# Patient Record
Sex: MALE | Race: WHITE
[De-identification: names, ages, dates, MRNs, and addresses within clinical notes are randomized per-mention and may not be internally consistent; named-entity substitution may affect disease eponyms.]

---

## 2018-10-03 ENCOUNTER — HOSPITAL ENCOUNTER (OUTPATIENT)
Dept: HOSPITAL 47 - RADNMMAIN | Age: 78
Discharge: HOME | End: 2018-10-03
Attending: INTERNAL MEDICINE
Payer: MEDICARE

## 2018-10-03 ENCOUNTER — HOSPITAL ENCOUNTER (OUTPATIENT)
Dept: HOSPITAL 47 - RADECHMAIN | Age: 78
Discharge: HOME | End: 2018-10-03
Payer: MEDICARE

## 2018-10-03 ENCOUNTER — HOSPITAL ENCOUNTER (OUTPATIENT)
Dept: HOSPITAL 47 - LABWHC1 | Age: 78
Discharge: HOME | End: 2018-10-03
Payer: MEDICARE

## 2018-10-03 DIAGNOSIS — I07.1: Primary | ICD-10-CM

## 2018-10-03 DIAGNOSIS — Z01.812: Primary | ICD-10-CM

## 2018-10-03 DIAGNOSIS — R06.02: ICD-10-CM

## 2018-10-03 DIAGNOSIS — I20.8: Primary | ICD-10-CM

## 2018-10-03 LAB
ALBUMIN SERPL-MCNC: 4.3 G/DL (ref 3.5–5)
ALP SERPL-CCNC: 84 U/L (ref 38–126)
ALT SERPL-CCNC: 38 U/L (ref 21–72)
ANION GAP SERPL CALC-SCNC: 7 MMOL/L
AST SERPL-CCNC: 48 U/L (ref 17–59)
BASOPHILS # BLD AUTO: 0 K/UL (ref 0–0.2)
BASOPHILS NFR BLD AUTO: 1 %
BUN SERPL-SCNC: 16 MG/DL (ref 9–20)
CALCIUM SPEC-MCNC: 9.8 MG/DL (ref 8.4–10.2)
CHLORIDE SERPL-SCNC: 110 MMOL/L (ref 98–107)
CO2 SERPL-SCNC: 26 MMOL/L (ref 22–30)
EOSINOPHIL # BLD AUTO: 0.2 K/UL (ref 0–0.7)
EOSINOPHIL NFR BLD AUTO: 3 %
ERYTHROCYTE [DISTWIDTH] IN BLOOD BY AUTOMATED COUNT: 4.82 M/UL (ref 4.3–5.9)
ERYTHROCYTE [DISTWIDTH] IN BLOOD: 14.7 % (ref 11.5–15.5)
GLUCOSE SERPL-MCNC: 141 MG/DL (ref 74–99)
HCT VFR BLD AUTO: 43.2 % (ref 39–53)
HGB BLD-MCNC: 14 GM/DL (ref 13–17.5)
LYMPHOCYTES # SPEC AUTO: 1.8 K/UL (ref 1–4.8)
LYMPHOCYTES NFR SPEC AUTO: 29 %
MCH RBC QN AUTO: 29.1 PG (ref 25–35)
MCHC RBC AUTO-ENTMCNC: 32.4 G/DL (ref 31–37)
MCV RBC AUTO: 89.6 FL (ref 80–100)
MONOCYTES # BLD AUTO: 0.3 K/UL (ref 0–1)
MONOCYTES NFR BLD AUTO: 5 %
NEUTROPHILS # BLD AUTO: 3.7 K/UL (ref 1.3–7.7)
NEUTROPHILS NFR BLD AUTO: 60 %
PLATELET # BLD AUTO: 103 K/UL (ref 150–450)
POTASSIUM SERPL-SCNC: 5 MMOL/L (ref 3.5–5.1)
PROT SERPL-MCNC: 7.2 G/DL (ref 6.3–8.2)
SODIUM SERPL-SCNC: 143 MMOL/L (ref 137–145)
WBC # BLD AUTO: 6.1 K/UL (ref 3.8–10.6)

## 2018-10-03 PROCEDURE — 93351 STRESS TTE COMPLETE: CPT

## 2018-10-03 PROCEDURE — 93306 TTE W/DOPPLER COMPLETE: CPT

## 2018-10-03 PROCEDURE — 36415 COLL VENOUS BLD VENIPUNCTURE: CPT

## 2018-10-03 PROCEDURE — 85025 COMPLETE CBC W/AUTO DIFF WBC: CPT

## 2018-10-03 PROCEDURE — 80053 COMPREHEN METABOLIC PANEL: CPT

## 2018-10-03 NOTE — ECHOF
Referral Reason:Shortness of Breath, EKG Changes



MEASUREMENTS

--------

HEIGHT: 177.8 cm

WEIGHT: 104.3 kg

BP: 110/49

RVIDd:   3.3 cm     (< 3.3)

IVSd:   1.1 cm     (0.6 - 1.1)

LVIDd:   5.4 cm     (3.9 - 5.3)

LVPWd:   1.0 cm     (0.6 - 1.1)

IVSs:   1.6 cm

LVIDs:   3.6 cm

LVPWs:   1.4 cm

LA Diam:   2.7 cm     (2.7 - 3.8)

LAESV Index (A-L):   19.53 ml/m

Ao Diam:   3.5 cm     (2.0 - 3.7)

AV Cusp:   2.2 cm     (1.5 - 2.6)

MV EXCURSION:   18.395 mm     (> 18.000)

MV EF SLOPE:   43 mm/s     (70 - 150)

EPSS:   1.3 cm

MV E Amadeo:   0.64 m/s

MV DecT:   222 ms

MV A Amadeo:   0.73 m/s

MV E/A Ratio:   0.87 

RAP:   5.00 mmHg

RVSP:   24.71 mmHg







FINDINGS

--------

Sinus rhythm.

This was a technically good study.

The left ventricular size is normal.   There is borderline concentric left ventricular hypertrophy.  
 Overall left ventricular systolic function is normal with, an EF between 55 - 60 %.

The right ventricle is mildly enlarged.

Normal LA  size by volume 22+/-6 ml/m2.

The right atrium is normal in size.

Lipomatous Hypertrophy of the atrial septum is present

The aortic valve is trileaflet and appears structurally normal.

The mitral valve is normal.

Mild tricuspid regurgitation present.   Right ventricular systolic pressure is normal at < 35 mmHg.

There is no pulmonic regurgitation present.

The aortic root size is normal.

IVC Not well visulized.

There is no pericardial effusion.



CONCLUSIONS

--------

1. Sinus rhythm.

2. This was a technically good study.

3. The left ventricular size is normal.

4. There is borderline concentric left ventricular hypertrophy.

5. Overall left ventricular systolic function is normal with, an EF between 55 - 60 %.

6. The right ventricle is mildly enlarged.

7. Normal LA size by volume 22+/-6 ml/m2.

8. Lipomatous Hypertrophy of the atrial septum is present

9. The aortic valve is trileaflet and appears structurally normal.

10. The mitral valve is normal.

11. Mild tricuspid regurgitation present.

12. Right ventricular systolic pressure is normal at < 35 mmHg.

13. There is no pulmonic regurgitation present.

14. The aortic root size is normal.

15. IVC Not well visulized.

16. There is no pericardial effusion.





SONOGRAPHER: Orni Ge RDCS

## 2018-10-03 NOTE — ECHOS
STRESS ECHOCARDIOGRAM



INDICATIONS:

Chest pain.



BASELINE HEART RATE:

77



BASELINE BLOOD PRESSURE:

110/39



MAXIMUM HEART RATE:

131



MAXIMUM BLOOD PRESSURE:

160/42



85% MPHR:

119



100% MPHR:

140



METS:

7.0



MAXIMUM STAGE REACHED:

2



TOTAL EXERCISE TIME:

5:00



CLINICAL INFORMATION:

Baseline rhythm is sinus mechanism, normal axis and intervals, normal echocardiogram,

rate off 77, baseline blood pressure 110/39 mmHg. Patient exercised on Corbin protocol

for 5 minute reaching peak rate 131 beats per minute which is equal to 93% maximum

predicted heart rate.  Peak blood pressure 160/42 mmHg.  Test was terminated due to

fatigue and dyspnea.  There was no chest pain.



Electrocardiograph monitoring revealed 1 mm ST-segment depression inferolateral leads

that resolved slowly in recovery.



FINDINGS:

Baseline echocardiogram revealed normal function at peak exercise, there was mild

hypokinesis in the inferolateral leads that improved in recovery.



CONCLUSION:

1. Good exercise tolerance with positive electrocardiograph stress testing.

2. Probably abnormal stress echocardiogram because of the quality of the images

    raising the possibility of inferolateral wall ischemia.





MMODL / IJN: [id number] / Job#: [id number]

## 2018-10-03 NOTE — HP
HISTORY AND PHYSICAL



Patient will be admitted to undergo cardiac catheterization on October 5, 2018. Mr. Randle is an 78-year-old male with known history of hypertension, hyperlipidemia,

diabetes mellitus, who has been complaining of progressive dyspnea on exertion without

associated chest discomfort.  He has no history of peripheral edema.  No PND.  No

orthopnea.  No dizziness.  No palpitation or syncope.  He has underwent a stress

echocardiogram where he walked for 5 minutes, had a 1 mm ST-segment depression with

questionable inferolateral wall hypokinesis at peak exercise.  Patient has no prior

stress test.  He has questionable history of stroke in the past.  His coronary risk

factors are remarkable for diabetes, hypertension, hyperlipidemia.  He stopped smoking

over 30 years ago.



MEDICATIONS INCLUDE::

1. Aspirin.

2. Allopurinol 100 mg daily.

3. Janumet  twice a day.

4. Crestor 10 mg daily.

5. Lisinopril 2.5 mg daily.

6. Vitamin C.

7. Insulin.



REVIEW OF SYSTEMS:

RESPIRATORY SYSTEM:  There is no history of documented asthma, emphysema or bronchitis.

GI SYSTEM:  No recent GI bleeding.  No peptic ulcer disease.

 SYSTEM:  No dysuria or hematuria.

NERVOUS SYSTEM:  No stroke or seizure.



PHYSICAL EXAMINATION:

An 78-year-old male, alert, oriented, in no apparent distress.  Appears younger than

stated age.  Blood pressure 130/70 with a heart rate in the 60s.

HEAD:  Normocephalic.

EYES: Sclerae nonicteric.

NECK:  Good upstroke, no bruit, no shunts.

CHEST:  Clear to auscultation.

HEART:  Regular rate and rhythm, S1, S2.  No S3.  No rub or gallop.

ABDOMEN:  Soft, nontender, positive bowel sounds, no organomegaly.

EXTREMITIES:  No edema, intact pulses.



IMPRESSION:

1. Symptoms of progressive dyspnea on exertion with abnormal stress echocardiogram

    consistent with stress-induced ischemia.

2. Hypertension.

3. Hyperlipidemia.

4. Diabetes mellitus.



RECOMMENDATION:

I recommend proceeding with coronary angiography to assess his status and guide his

treatment.  The rationale behind the procedures, risks and complication were discussed

with the patient who is in full understanding and agreement.  An echocardiogram with

Doppler will be obtained and depending on the results of testing, further

recommendation will be made.





MMODL / IJN: [id number] / Job#: [id number]

## 2018-10-05 ENCOUNTER — HOSPITAL ENCOUNTER (OUTPATIENT)
Dept: HOSPITAL 47 - CATHCVL | Age: 78
LOS: 2 days | Discharge: HOME | End: 2018-10-07
Payer: MEDICARE

## 2018-10-05 VITALS — BODY MASS INDEX: 31.5 KG/M2

## 2018-10-05 DIAGNOSIS — I25.84: ICD-10-CM

## 2018-10-05 DIAGNOSIS — E11.9: ICD-10-CM

## 2018-10-05 DIAGNOSIS — N40.0: ICD-10-CM

## 2018-10-05 DIAGNOSIS — Z79.899: ICD-10-CM

## 2018-10-05 DIAGNOSIS — I77.1: ICD-10-CM

## 2018-10-05 DIAGNOSIS — Z87.891: ICD-10-CM

## 2018-10-05 DIAGNOSIS — M10.9: ICD-10-CM

## 2018-10-05 DIAGNOSIS — I25.10: Primary | ICD-10-CM

## 2018-10-05 DIAGNOSIS — E78.5: ICD-10-CM

## 2018-10-05 DIAGNOSIS — Z82.49: ICD-10-CM

## 2018-10-05 DIAGNOSIS — M19.90: ICD-10-CM

## 2018-10-05 DIAGNOSIS — I07.1: ICD-10-CM

## 2018-10-05 DIAGNOSIS — I10: ICD-10-CM

## 2018-10-05 DIAGNOSIS — Z79.4: ICD-10-CM

## 2018-10-05 DIAGNOSIS — Z79.82: ICD-10-CM

## 2018-10-05 DIAGNOSIS — I65.23: ICD-10-CM

## 2018-10-05 LAB
GLUCOSE BLD-MCNC: 111 MG/DL (ref 75–99)
GLUCOSE BLD-MCNC: 156 MG/DL (ref 75–99)
GLUCOSE BLD-MCNC: 220 MG/DL (ref 75–99)
PH UR: 6.5 [PH] (ref 5–8)
SP GR UR: 1.02 (ref 1–1.03)
UROBILINOGEN UR QL STRIP: <2 MG/DL (ref ?–2)

## 2018-10-05 PROCEDURE — 85610 PROTHROMBIN TIME: CPT

## 2018-10-05 PROCEDURE — 93880 EXTRACRANIAL BILAT STUDY: CPT

## 2018-10-05 PROCEDURE — 93922 UPR/L XTREMITY ART 2 LEVELS: CPT

## 2018-10-05 PROCEDURE — 93970 EXTREMITY STUDY: CPT

## 2018-10-05 PROCEDURE — 86901 BLOOD TYPING SEROLOGIC RH(D): CPT

## 2018-10-05 PROCEDURE — 84443 ASSAY THYROID STIM HORMONE: CPT

## 2018-10-05 PROCEDURE — 71046 X-RAY EXAM CHEST 2 VIEWS: CPT

## 2018-10-05 PROCEDURE — 80053 COMPREHEN METABOLIC PANEL: CPT

## 2018-10-05 PROCEDURE — 87070 CULTURE OTHR SPECIMN AEROBIC: CPT

## 2018-10-05 PROCEDURE — 80074 ACUTE HEPATITIS PANEL: CPT

## 2018-10-05 PROCEDURE — 80061 LIPID PANEL: CPT

## 2018-10-05 PROCEDURE — 86920 COMPATIBILITY TEST SPIN: CPT

## 2018-10-05 PROCEDURE — 71250 CT THORAX DX C-: CPT

## 2018-10-05 PROCEDURE — 94150 VITAL CAPACITY TEST: CPT

## 2018-10-05 PROCEDURE — 85730 THROMBOPLASTIN TIME PARTIAL: CPT

## 2018-10-05 PROCEDURE — 85025 COMPLETE CBC W/AUTO DIFF WBC: CPT

## 2018-10-05 PROCEDURE — 81003 URINALYSIS AUTO W/O SCOPE: CPT

## 2018-10-05 PROCEDURE — 83036 HEMOGLOBIN GLYCOSYLATED A1C: CPT

## 2018-10-05 PROCEDURE — 87086 URINE CULTURE/COLONY COUNT: CPT

## 2018-10-05 PROCEDURE — 86850 RBC ANTIBODY SCREEN: CPT

## 2018-10-05 PROCEDURE — 92924 PRQ TRLUML C ATHRC 1 ART&/BR: CPT

## 2018-10-05 PROCEDURE — 83735 ASSAY OF MAGNESIUM: CPT

## 2018-10-05 PROCEDURE — 86900 BLOOD TYPING SEROLOGIC ABO: CPT

## 2018-10-05 PROCEDURE — 93454 CORONARY ARTERY ANGIO S&I: CPT

## 2018-10-05 RX ADMIN — INSULIN ASPART SCH: 100 INJECTION, SOLUTION INTRAVENOUS; SUBCUTANEOUS at 12:31

## 2018-10-05 RX ADMIN — INSULIN ASPART SCH UNIT: 100 INJECTION, SOLUTION INTRAVENOUS; SUBCUTANEOUS at 21:32

## 2018-10-05 RX ADMIN — INSULIN ASPART SCH: 100 INJECTION, SOLUTION INTRAVENOUS; SUBCUTANEOUS at 19:00

## 2018-10-05 RX ADMIN — INSULIN ASPART SCH UNIT: 100 INJECTION, SOLUTION INTRAVENOUS; SUBCUTANEOUS at 19:04

## 2018-10-05 RX ADMIN — INSULIN ASPART SCH UNIT: 100 INJECTION, SOLUTION INTRAVENOUS; SUBCUTANEOUS at 13:11

## 2018-10-05 RX ADMIN — INSULIN DETEMIR SCH UNIT: 100 INJECTION, SOLUTION SUBCUTANEOUS at 21:32

## 2018-10-05 RX ADMIN — MUPIROCIN SCH APPLIC: 20 OINTMENT TOPICAL at 21:33

## 2018-10-05 NOTE — P.CONS
History of Present Illness





- Reason for Consult


Consult date: 10/05/18


Medical management





- History of Present Illness





This is an 78-year-old male patient of Dr. Christiansen.  He has underlying history of 

diabetes mellitus type 2, hypertension, BPH, hyperlipidemia, remote history of 

tobacco use.  Patient states that he went to see Dr. Christiansen for his physical and 

he complained that when he was pulling weeds he developed shortness of breath 

and he had to sit down and rest.  He underwent a stress test which was positive 

and was then scheduled for heart catheterization with Dr. Maier which found a 

99% stenosis in the proximal left circumflex, 30% stenosis of right coronaries 

artery and diffuse intimal disease.  PTCA and arthrectomy was attempted but 

unsuccessful of the circumflex lesion.  Cardiothoracic surgery was then 

consulted for 1 vessel CABG.





Review of Systems


All systems: negative


Constitutional: Reports fatigue, Denies chills, Denies fever


Eyes: denies blurred vision, denies pain


Ears, nose, mouth and throat: Denies headache, Denies sore throat


Cardiovascular: Reports decreased exercise tolerance, Reports dyspnea on 

exertion, Denies chest pain, Denies lightheadedness, Denies shortness of breath

, Denies syncope


Respiratory: Denies cough, Denies cough with sputum, Denies dyspnea, Denies 

excessive sputum, Denies hemoptysis, Denies home oxygen, Denies wheezing


Gastrointestinal: Denies abdominal pain, Denies diarrhea, Denies nausea, Denies 

vomiting


Genitourinary: Denies dysuria


Musculoskeletal: Denies myalgias


Integumentary: Denies pruritus, Denies rash


Neurological: Denies numbness, Denies weakness


Psychiatric: Denies anxiety, Denies depression


Endocrine: Denies fatigue, Denies weight change





Past Medical History


Past Medical History: Coronary Artery Disease (CAD), Diabetes Mellitus, 

Hyperlipidemia, Hypertension


Additional Past Medical History / Comment(s): BPH


History of Any Multi-Drug Resistant Organisms: None Reported


Past Surgical History: Appendectomy, Joint Replacement, Tonsillectomy


Additional Past Surgical History / Comment(s): left knee replaced, right 

rotator cuff SX, COLONOSCOPY,


Past Anesthesia/Blood Transfusion Reactions: No Reported Reaction


Past Psychological History: No Psychological Hx Reported


Smoking Status: Former smoker


Past Alcohol Use History: Occasional


Additional Past Alcohol Use History / Comment(s): Patient was a smoker of one 

pack per day and QUIT SMOKING 35 YRS AGO.  He drinks approximately 1 beer/

3months.


Past Drug Use History: None Reported





- Past Family History


  ** Mother


Family Medical History: No Reported History


Additional Family Medical History / Comment(s): Mother had history of 

congenital heart disease, CHF.





  ** Father


Family Medical History: No Reported History, CVA/TIA


Additional Family Medical History / Comment(s): Father contracted malaria.  

History of CVA.





  ** Brother(s)


Family Medical History: No Reported History


Additional Family Medical History / Comment(s): Brother has history of 

headaches.





  ** Sister(s)


Family Medical History: No Reported History





  ** Daughter(s)


Family Medical History: No Reported History


Additional Family Medical History / Comment(s): Patient has one daughter with 

no major medical problems.





  ** Son(s)


Family Medical History: No Reported History


Additional Family Medical History / Comment(s): Patient has one son with no 

major medical problems.





Medications and Allergies


 Home Medications











 Medication  Instructions  Recorded  Confirmed  Type


 


Allopurinol [Zyloprim] 100 mg PO DAILY 12/22/14 10/05/18 History


 


Insulin Glargine,Hum.rec.anlog 80 unit SQ HS 12/22/14 10/05/18 History





[Lantus Solostar]    


 


Rosuvastatin Calcium [Crestor] 10 mg PO DAILY 12/22/14 10/05/18 History


 


sitaGLIPtin PHOS/metFORMIN HCL 1 tab PO BID 12/22/14 10/04/18 History





[Janumet 50-1,000 mg Tablet]    


 


Aspirin EC [Ecotrin Low Dose] 325 mg PO BID 06/01/16 10/05/18 History


 


Canagliflozin [Invokana] 100 mg PO DAILY 06/01/16 10/05/18 History


 


INSULIN LISPRO (HumaLOG) [humaLOG] 10 units SQ AC-TID 06/01/16 10/04/18 History


 


Lisinopril [Zestril] 2.5 mg PO DAILY 06/01/16 10/05/18 History


 


Ascorbic Acid [Vitamin C] 250 mg PO DAILY 10/04/18 10/05/18 History











 Allergies











Allergy/AdvReac Type Severity Reaction Status Date / Time


 


No Known Allergies Allergy   Verified 10/04/18 12:22














Physical Exam


Vitals: 


 Vital Signs











  Temp Pulse Pulse Resp BP BP Pulse Ox


 


 10/05/18 10:43    75   138/66  


 


 10/05/18 10:28    79    124/82 


 


 10/05/18 10:13   58 L    137/91   97


 


 10/05/18 06:50  98.7 F  58 L   18  119/59  134/71  94 L








 Intake and Output











 10/04/18 10/05/18 10/05/18





 22:59 06:59 14:59


 


Intake Total   452


 


Output Total   600


 


Balance   -148


 


Intake:   


 


  IV   352


 


  Intake, IV Titration   100





  Amount   


 


    Sodium Chloride 0.9% 1,   100





    000 ml @ 100 mls/hr IV .   





    Q10H [facility] Rx#:[id number]   


 


Output:   


 


  Urine   600


 


Other:   


 


  Weight  99.7 kg 99.7 kg

















Gen: This is an 78-year-old  male patient.  He is sitting up in bed 

appears to be comfortable and in no acute distress. 


HEENT: Head is atraumatic, normocephalic. Pupils equal, round. Sclerae is 

anicteric. 


NECK: Supple. No JVD. No lymphadenopathy. No thyromegaly. 


LUNGS: Clear to auscultation. No wheezes or rhonchi.  No intercostal 

retractions.


HEART: Regular rate and rhythm. No murmur. 


ABDOMEN: Soft. Bowel sounds are present. No masses.  No tenderness.


EXTREMITIES: No pedal edema.  No calf tenderness.


NEUROLOGICAL: Patient is awake, alert and oriented x3. Cranial nerves 2 through 

12 are grossly intact. 








Results


Labs: 


 Abnormal Lab Results - Last 24 Hours (Table)











  10/05/18 Range/Units





  06:58 


 


POC Glucose (mg/dL)  156 H  (75-99)  mg/dL














Assessment and Plan


Plan: 





1.  Coronary artery disease with plan for 1 vessel CABG.  Cardiothoracic 

surgery team on consult.  Continue aspirin daily, Lipitor 20 mg daily





2.  Diabetes mellitus type 2.  Patient is normally on Lantus 80 units which 

will be decreased to 45 units at bedtime, NovoLog normally 10 units will be 

decreased to 5 units 3 times daily with meals and NovoLog scale.  Invokana, 

Janumet will be discontinued.  Hemoglobin A1c ordered.





3.  Hypertension.  Continue lisinopril 2.5 mg daily





4.  Hyperlipidemia.  Continue Lipitor.





5.  Benign prostatic hypertrophy.





6.  Osteoarthritis, generalized, stable.





7.  Remote history of tobacco use.





8.  Gout, stable.  Continue allopurinol 100 mg daily.





9.  GI prophylaxis.  Pepcid.





Patient will be admitted to the hospital for a minimum of 5 night stay.


Discharge plan: To be determined is likely return home.


Impression and plan of care have been directed as dictated by the signing 

physician.  Millie Crump nurse practitioner acting as scribe for signing 

physician.

## 2018-10-05 NOTE — PTCA
PERCUTANEOUSTRANS CORORONARY ANGIOGRAPHY



Mr. Magaña is an 78-year-old male with known history of hypertension, hyperlipidemia,

and diabetes mellitus, who has had an abnormal stress echocardiogram and was

symptomatic, underwent cardiac catheterization, was found to have heavily calcified

coronary arteries with critical stenosis involving the proximal left circumflex. In

view of that, recommendation was made regarding angioplasty and stenting.  The

procedure as well as the risks and complication were discussed with the patient who is

in full understanding and agreement.



PROCEDURE:

A 6-Arabic FL 3.5 guiding catheter introduced in system. After cannulating the left

main a 0.014 balanced medium weight J-wire was advanced across the lesion and

positioned in the distal obtuse marginal branch.  Subsequently, a 3.0 x 12 mm Trek

balloon was advanced that was unable to cross the lesion that balloon was removed and

attempt to advance a 2.0 x 8 mm Trek balloon were unsuccessful as well.  The balloon

was removed and a whisper 0.014 J-wire was advanced next to the first one and

positioned distally.  Subsequently attempt to advance the 2.0 x 8 balloon were

unsuccessful.  That was removed, then a 1.5 x 6 mm Trek balloon was advanced and the

balloon will not advance. That balloon was removed and a GuideLiner was advanced after

removing the BMW J. Subsequently the 1.5 x 6 mm Trek balloon was advanced and

inflations were done.  After removing that balloon, a 2.0 x 6 mm Trek balloon was

advanced and inflations were done.  After that, the balloon was removed and a 3.0 x 12

mm Trek balloon was advanced and inflation up to 20 atmospheres were done

_____inability to open up the lesion completely.  After removing that balloon, attempt

to advance a 3.0 x 10 mm AngioSculpt balloon were unsuccessful.  That was removed and

attempt to advance a 3.25 x 12 mm NC Trek was unsuccessful as well as a 2.0 x 8 NC Trek

was unsuccessful.  At that point, another 0.014 whisper J-wire was advanced distally

and exchanged over a FineCross to the Viper stent.  After removing the the FineCross,

360 rotational arthrectomy was performed 3 runs. After removing that and exchanging the

wire back to whisper J wire and advancing the GuideLiner, attempt to advance the Trek

3.0 x 12 mm was unsuccessful. Subsequently a 2.5 x 8 mm NC Trek balloon was advanced

and inflation up to 10 atmospheres were done.  The balloon was removed and the

GuideLiner was removed.  Another whisper J-wire was advanced with the help of the

[place]. The wire was exchanged to a Mailman.  Attempt to advance the 3.0 x 12 mm

balloon over the Mailman and over the whisper were unsuccessful.  At that point, the

guiding catheter, the balloon and the guidewire were removed the sheath was removed.

Hemostasis was obtained with deployment of a TR band.  There was no immediate

complication. Patient was returned to his room in stable condition.  Of note, the

patient received Angiomax per protocol as well as oral loading dose of Brilinta.  He

had no chest discomfort or EKG changes with the inflation.



RESULTS:

Unsuccessful angioplasty of the proximal left circumflex with reduction of stenosis

from 95% to 80% in a heavily calcified angulated segment.



RECOMMENDATION:

Patient will be evaluated for possible single bypass surgery.  Those findings and

recommendation were discussed with the patient and his family and are in full

understanding and agreement.



Duration of procedure are 1 hour 57 minutes.





MMODL / IJN: [id number] / Job#: [id number]

## 2018-10-05 NOTE — CC
CARDIAC CATHETERIZATION REPORT



Mr. Magaña is an 78-year-old male with a known history of hypertension,

hyperlipidemia, diabetes mellitus who has been complaining of dyspnea on exertion.

Underwent a stress test where he had ST-segment depression as well as evidence of

stress-induced ischemia involving the lateral wall.  In view of that, recommendation

made regarding cardiac catheterization.  The procedures, risks and complications were

discussed with the patient who is in full understanding and agreement.



PROCEDURE:

Patient was brought to the cath lab in a fasting semi-sedated state after receiving

fentanyl and Benadryl and achieving moderate conscious sedated state.  Using Xylocaine

anesthesia and Seldinger technique, a 6-Setswana sheath was introduced in the right

radial artery.  Selective right and left angiography performed using 5-Setswana 3.5 bend

right and left Tess catheter, multiple views of the coronary artery including

hemiaxial views were obtained. Following that, the catheter were removed, images were

reviewed.



FLUOROSCOPY:

There was severe calcification involving all of the coronary arteries.



LEFT MAIN: This is a large-sized vessel bifurcating into left circumflex, left anterior

descending artery, left main coronary artery has no evidence of high-grade stenosis.



LEFT ANTERIOR DESCENDING ARTERY:  This is a large-sized vessel reaching toward the apex

with a wraparound apex segment, giving rise to two diagonal branch, the left anterior

descending artery and the proximal mid segment has intimal disease with area of

stenosis of 30% to 40% without any evidence of high-grade stenosis.



LEFT CIRCUMFLEX:  This is a large nondominant vessel giving rise to 2 obtuse marginal

branch in the proximal left circumflex and a calcified tortuous segment that has a 99%

stenosis.  The rest of the vessel has no high-grade stenosis.



RIGHT CORONARY ARTERY:  This is a large dominant vessel, bifurcating into PDA and

posterolateral segment branches.  The right coronary artery in mid segment has diffuse

intimal disease of 30%.  The rest of the vessel has no high-grade stenosis.



LEFT VENTRICULOGRAM:  Left ventriculogram was not performed.



CONCLUSION:

1. Heavily calcified coronary arteries.

2. Severe critical stenosis involving the proximal left circumflex.

3. Mild to moderate disease in the left anterior descending artery and right coronary

    artery.



RECOMMENDATION:

In view of finding anatomy, I recommend proceeding with angioplasty and stenting.  The

procedures, risks and complications were discussed with the patient who is in full

understanding and agreement.





MMODL / IJN: [id number] / Job#: [id number]

## 2018-10-05 NOTE — P.GSCN
<Ray Kruger - Last Filed: 10/05/18 12:05>





History of Present Illness


Consult date: 10/05/18


Reason for Consult: 





Coronary artery disease, evaluation for myocardial revascularization.


Requesting physician: Brandon Maier


History of present illness: 





This is an 78-year-old gentleman who is followed by Dr. Christiansen on an outpatient 

basis.  He has a past medical history significant for hyperlipidemia, 

hypertension,  diabetes mellitus type 2 insulin-dependent and a remote history 

of tobacco dependence which he quit over 35 years ago.  Recently, the patient 

has been complaining of progressive shortness of breath with exertion.  He 

denies any complaints of chest pain, peripheral edema, syncope or palpitations.

  Subsequently, he was seen by Dr. Maier from cardiology who performed a 

stress test on the patient on 10/03/2018.  The stress test results showed good 

exercise tolerance although revealed a 1 mm ST segment depression with 

questionable inferolateral wall hypokinesis at peak exercise.  A 2-D 

echocardiogram was also completed which showed a overall left ventricular 

systolic function to be normal with an ejection fraction between 55 and 60%, 

and mild tricuspid valve regurgitation.  Due to the patient's complaints of 

progressive shortness of breath and stress test results he was recommended to 

undergo a cardiac catheterization.  After consent was obtained Dr. Maier 

performed a cardiac catheterization today which demonstrated heavily calcified 

coronary arteries, with a 30-40% stenosis to his proximal and mid segment of 

his left anterior descending coronary artery, a 99% stenosis to his proximal 

left circumflex coronary artery, and a 30% stenosis to his right coronary 

artery with diffuse intimal disease.  Subsequently due to his cardiac 

catheterization findings of a 99% proximal stenosis to his circumflex coronary 

artery Dr. Maier attempted an unsuccessful PTCA/arthrectomy to the circumflex 

coronary artery due to heavy calcification.  The cardiac catheterization 

results were reviewed with the patient by Dr. Maier and a consult was placed 

to Dr. Amaro from cardiothoracic surgery for evaluation for possible myocardial 

revascularization surgery.





Review of Systems





A 14 point review of systems was completed except as mentioned in HPI.





Past Medical History


Past Medical History: Diabetes Mellitus, Hyperlipidemia, Hypertension


History of Any Multi-Drug Resistant Organisms: None Reported


Past Surgical History: Appendectomy, Joint Replacement, Tonsillectomy


Additional Past Surgical History / Comment(s): left knee replaced, right 

rotator cuff SX, COLONOSCOPY,


Past Anesthesia/Blood Transfusion Reactions: No Reported Reaction


Past Psychological History: No Psychological Hx Reported


Smoking Status: Former smoker (Quit over 35 years ago.)


Past Alcohol Use History: Rare


Additional Past Alcohol Use History / Comment(s): QUIT SMOKING 35 YRS AGO


Past Drug Use History: None Reported





- Past Family History


  ** Mother


Family Medical History: Congestive Heart Failure (CHF)


Additional Family Medical History / Comment(s): Past away at age 85.





  ** Father


Additional Family Medical History / Comment(s):  from complication from 

malaria at age 27





  ** Brother(s)


Family Medical History: No Reported History





  ** Sister(s)


Family Medical History: No Reported History





  ** Daughter(s)


Family Medical History: No Reported History





  ** Son(s)


Family Medical History: No Reported History





Medications and Allergies


 Home Medications











 Medication  Instructions  Recorded  Confirmed  Type


 


Allopurinol [Zyloprim] 100 mg PO DAILY 12/22/14 10/05/18 History


 


Insulin Glargine,Hum.rec.anlog 80 unit SQ HS 12/22/14 10/05/18 History





[Lantus Solostar]    


 


Rosuvastatin Calcium [Crestor] 10 mg PO DAILY 12/22/14 10/05/18 History


 


sitaGLIPtin PHOS/metFORMIN HCL 1 tab PO BID 12/22/14 10/04/18 History





[Janumet 50-1,000 mg Tablet]    


 


Aspirin EC [Ecotrin Low Dose] 325 mg PO BID 06/01/16 10/05/18 History


 


Canagliflozin [Invokana] 100 mg PO DAILY 06/01/16 10/05/18 History


 


INSULIN LISPRO (HumaLOG) [humaLOG] 10 units SQ AC-TID 06/01/16 10/04/18 History


 


Ascorbic Acid [Vitamin C] 250 mg PO DAILY 10/04/18 10/05/18 History


 


Isosorbide Mononitrate ER [Imdur] 30 mg PO DAILY #30 tab 10/07/18  Rx


 


Lisinopril [Zestril] 2.5 mg PO DAILY  tab 10/07/18  Rx


 


Metoprolol Tartrate [Lopressor] 25 mg PO BID #60 tab 10/07/18  Rx


 


Nitroglycerin Sl Tabs [Nitrostat] 0.4 mg SUBLINGUAL Q5M PRN #30 tab 10/07/18  Rx











 Allergies











Allergy/AdvReac Type Severity Reaction Status Date / Time


 


No Known Allergies Allergy   Verified 10/04/18 12:22














Surgical - Exam


 Vital Signs











Temp Pulse Resp BP Pulse Ox


 


 98.7 F   58 L  18   119/59   94 L


 


 10/05/18 06:50  10/05/18 06:50  10/05/18 06:50  10/05/18 06:50  10/05/18 06:50














- General


well developed, well nourished, no distress, no pain, obese





- Eyes


PERRL, normal ocular movement





- ENT


normal pinna, normal nares, normal mucosa, decreased hearing (Hearing aids to 

both ears.), poor alf (Full dentures.)





- Neck





Neck is supple, no lymphadenopathy.


no masses, no bruits, trachea midline, no venous distension





- Respiratory





Lung sounds essentially clear throughout, diminished to his bilateral bases.  

Respirations are symmetrical and nonlabored.





- Cardiovascular





Regular rhythm and rate.  S1 and S2 present, negative for S3, gallop or murmur.

  No peripheral edema.





- Abdomen





Abdomen is soft, nontender nondistended.  Active bowel sounds all 4 abdominal 

quadrants.  No guarding or rigidity.  No organomegaly.





- Genitourinary





Deferred





- Rectum





Deferred





- Integumentary


no rash, no growths, no abnormal pigmentation





- Neurologic


normal coordination, normal sensation





- Musculoskeletal


normal gait, normal posture





- Psychiatric


oriented to time, oriented to person, oriented to place, speech is normal, 

memory intact





Results





- Labs


 Abnormal Lab Results - Last 24 Hours (Table)











  10/05/18 Range/Units





  06:58 


 


POC Glucose (mg/dL)  156 H  (75-99)  mg/dL














- Imaging


Comments: 





Stress test results, 2-D echocardiogram results and cardiac catheterization 

results reviewed.





Assessment and Plan


(1) Coronary artery disease


Status: Acute   Code(s): I25.10 - ATHSCL HEART DISEASE OF NATIVE CORONARY 

ARTERY W/O ANG PCTRS   SNOMED Code(s): 75723400


   





(2) Hypertension


Status: Acute   Code(s): I10 - ESSENTIAL (PRIMARY) HYPERTENSION   SNOMED Code(s)

: 63151384


   





(3) Hyperlipidemia


Status: Acute   Code(s): E78.5 - HYPERLIPIDEMIA, UNSPECIFIED   SNOMED Code(s): 

71236405


   





(4) Insulin dependent diabetes mellitus


Status: Acute   Code(s): E11.9 - TYPE 2 DIABETES MELLITUS WITHOUT COMPLICATIONS

; Z79.4 - LONG TERM (CURRENT) USE OF INSULIN   SNOMED Code(s): 07817764


   





(5) Tobacco dependence in remission


Status: Acute   Code(s): F17.201 - NICOTINE DEPENDENCE, UNSPECIFIED, IN 

REMISSION   SNOMED Code(s): 748119636


   





Plan: 





Patient was seen and examined.  Chart diagnostics reviewed.  His case was 

discussed with Dr. Caldwell by Dr. Maier.  Preoperative teaching initiated.  

Preoperative testing initiated.  Medical management per Dr. Christiansen 

recommendations.  Encourage use of his incentive spirometry every hour while 

awake.  GI and DVT prophylaxis.





Thank you Dr. Dr. Maier for this consult and we look forward to working with 

you in the care of your patient.


Time with Patient: Greater than 30





<HabDarrion alves - Last Filed: 10/08/18 11:57>





Surgical - Exam


 Vital Signs











Temp Pulse Resp BP Pulse Ox


 


 98.7 F   58 L  18   119/59   94 L


 


 10/05/18 06:50  10/05/18 06:50  10/05/18 06:50  10/05/18 06:50  10/05/18 06:50














Results





- Labs





 10/06/18 06:34





 10/06/18 06:34


 Abnormal Lab Results - Last 24 Hours (Table)











  10/07/18 Range/Units





  12:04 


 


POC Glucose (mg/dL)  159 H  (75-99)  mg/dL








 Microbiology - Last 24 Hours (Table)











 10/05/18 19:30 Nasal Screen MRSA/MSSA - Final





 Nasal Swab 














Assessment and Plan


Plan: 


The patient was seen and examined.  I agree with the above assessment and plan.

  The patient is an 78-year-old male, who looks good for his advanced age, who 

reports worsening shortness of breath with activity.  Denies chest pain.  A 

stress test was performed which revealed ischemia involving the lateral wall.  

Heart catheterization revealed a single lesion in the circumflex artery.  PCI 

including atherectomy was attempted by Dr. Maier without success.  Apparently 

the lesion is quite calcified.  I was asked to evaluate him for single-vessel 

coronary artery bypass.  We will review his studies and begin our standard 

preoperative workup.  Further recommendations will follow.

## 2018-10-05 NOTE — US
EXAMINATION TYPE: US carotid duplex BILAT

 

DATE OF EXAM: 10/5/2018

 

COMPARISON: NONE

 

CLINICAL HISTORY: PreOp Cardiac Surgery.

 

EXAM MEASUREMENTS: 

 

RIGHT:  Peak Systolic Velocity (PSV) cm/sec

----- Right CCA:  78.6  

----- Right ICA:  87.9     

----- Right ECA:  95.6   

ICA/CCA ratio:  1.1    

 

RIGHT:  End Diastole cm/sec

----- Right CCA:  17.5   

----- Right ICA:  19.3      

----- Right ECA:  7.6     

 

LEFT:  Peak Systolic Velocity (PSV) cm/sec

----- Left CCA:  75.3  

----- Left ICA:  104.8   

----- Left ECA:  95.0  

ICA/CCA ratio:  1.4  

 

LEFT:  End Diastole cm/sec

----- Left CCA:  14.2  

----- Left ICA:  22.1   

----- Left ECA:  8.3 

 

VERTEBRALS (direction of flow):

Right Vertebral: Antegrade

Left Vertebral: Antegrade

 

Rhythm:  Normal

 

Mild amount of plaque visualized bilaterally. No elevated velocities, no significant stenosis. 

 

 

 

IMPRESSION:  There is antegrade flow in the vertebral arteries. The images and measurements suggest l
ess than 50% stenosis in both internal carotid arteries.   

 

 

Criteria for Assigning % of Stenosis / Diameter reduction

(Estimation based on the indirect measurements of the internal carotid artery velocities (ICA PSV).

1.  Normal (no stenosis)=ICA PSV < 125 cm/s: ratio < 2.0: ICA EDV<40 cm/s.

2. Less than 50% stenosis=ICA PSV < 125 cm/s: ratio < 2.0: ICA EDV<40 cm/s.

3.  50 to 69% stenosis=ICA PSV of 125 to 230 cm/s: ration 2.0 ? 4.0: ICA EDV  cm/s.

4.  Greater than 70% stenosis to near occlusion= ICA PSV > 230 cm/s: ratio > 4.0: ICA EDV > 100 cm/s.
 

5.  Near occlusion= ICA PSV velocities may be low or undetectable: variable ratio and ICA EDV.

6.  Total occlusion=unable to detect flow.

## 2018-10-06 LAB
ALBUMIN SERPL-MCNC: 3.8 G/DL (ref 3.5–5)
ALP SERPL-CCNC: 90 U/L (ref 38–126)
ALT SERPL-CCNC: 47 U/L (ref 21–72)
ANION GAP SERPL CALC-SCNC: 11 MMOL/L
APTT BLD: 25.6 SEC (ref 22–30)
AST SERPL-CCNC: 66 U/L (ref 17–59)
BASOPHILS # BLD AUTO: 0 K/UL (ref 0–0.2)
BASOPHILS NFR BLD AUTO: 1 %
BUN SERPL-SCNC: 16 MG/DL (ref 9–20)
CALCIUM SPEC-MCNC: 9.4 MG/DL (ref 8.4–10.2)
CHLORIDE SERPL-SCNC: 112 MMOL/L (ref 98–107)
CHOLEST SERPL-MCNC: 139 MG/DL (ref ?–200)
CO2 SERPL-SCNC: 20 MMOL/L (ref 22–30)
EOSINOPHIL # BLD AUTO: 0.2 K/UL (ref 0–0.7)
EOSINOPHIL NFR BLD AUTO: 3 %
ERYTHROCYTE [DISTWIDTH] IN BLOOD BY AUTOMATED COUNT: 4.45 M/UL (ref 4.3–5.9)
ERYTHROCYTE [DISTWIDTH] IN BLOOD: 14.7 % (ref 11.5–15.5)
GLUCOSE BLD-MCNC: 135 MG/DL (ref 75–99)
GLUCOSE BLD-MCNC: 173 MG/DL (ref 75–99)
GLUCOSE BLD-MCNC: 178 MG/DL (ref 75–99)
GLUCOSE BLD-MCNC: 204 MG/DL (ref 75–99)
GLUCOSE SERPL-MCNC: 130 MG/DL (ref 74–99)
HBA1C MFR BLD: 6.8 % (ref 4–6)
HCT VFR BLD AUTO: 38.3 % (ref 39–53)
HDLC SERPL-MCNC: 39 MG/DL (ref 40–60)
HGB BLD-MCNC: 13.2 GM/DL (ref 13–17.5)
INR PPP: 1.1 (ref ?–1.2)
LDLC SERPL CALC-MCNC: 71 MG/DL (ref 0–99)
LYMPHOCYTES # SPEC AUTO: 1.7 K/UL (ref 1–4.8)
LYMPHOCYTES NFR SPEC AUTO: 26 %
MAGNESIUM SPEC-SCNC: 1.8 MG/DL (ref 1.6–2.3)
MCH RBC QN AUTO: 29.6 PG (ref 25–35)
MCHC RBC AUTO-ENTMCNC: 34.4 G/DL (ref 31–37)
MCV RBC AUTO: 86.1 FL (ref 80–100)
MONOCYTES # BLD AUTO: 0.4 K/UL (ref 0–1)
MONOCYTES NFR BLD AUTO: 6 %
NEUTROPHILS # BLD AUTO: 4.2 K/UL (ref 1.3–7.7)
NEUTROPHILS NFR BLD AUTO: 63 %
PLATELET # BLD AUTO: 103 K/UL (ref 150–450)
POTASSIUM SERPL-SCNC: 4.6 MMOL/L (ref 3.5–5.1)
PROT SERPL-MCNC: 6.5 G/DL (ref 6.3–8.2)
PT BLD: 10.3 SEC (ref 9–12)
SODIUM SERPL-SCNC: 143 MMOL/L (ref 137–145)
TRIGL SERPL-MCNC: 146 MG/DL (ref ?–150)
WBC # BLD AUTO: 6.6 K/UL (ref 3.8–10.6)

## 2018-10-06 RX ADMIN — INSULIN ASPART SCH UNIT: 100 INJECTION, SOLUTION INTRAVENOUS; SUBCUTANEOUS at 17:37

## 2018-10-06 RX ADMIN — INSULIN ASPART SCH: 100 INJECTION, SOLUTION INTRAVENOUS; SUBCUTANEOUS at 06:36

## 2018-10-06 RX ADMIN — INSULIN ASPART SCH UNIT: 100 INJECTION, SOLUTION INTRAVENOUS; SUBCUTANEOUS at 20:56

## 2018-10-06 RX ADMIN — MUPIROCIN SCH APPLIC: 20 OINTMENT TOPICAL at 08:16

## 2018-10-06 RX ADMIN — INSULIN DETEMIR SCH UNIT: 100 INJECTION, SOLUTION SUBCUTANEOUS at 20:56

## 2018-10-06 RX ADMIN — ALLOPURINOL SCH MG: 100 TABLET ORAL at 08:13

## 2018-10-06 RX ADMIN — FAMOTIDINE SCH MG: 20 TABLET, FILM COATED ORAL at 08:14

## 2018-10-06 RX ADMIN — OXYCODONE HYDROCHLORIDE AND ACETAMINOPHEN SCH MG: 500 TABLET ORAL at 08:13

## 2018-10-06 RX ADMIN — ASPIRIN 325 MG ORAL TABLET SCH MG: 325 PILL ORAL at 20:57

## 2018-10-06 RX ADMIN — INSULIN ASPART SCH: 100 INJECTION, SOLUTION INTRAVENOUS; SUBCUTANEOUS at 14:42

## 2018-10-06 RX ADMIN — MUPIROCIN SCH APPLIC: 20 OINTMENT TOPICAL at 20:57

## 2018-10-06 RX ADMIN — INSULIN ASPART SCH UNIT: 100 INJECTION, SOLUTION INTRAVENOUS; SUBCUTANEOUS at 06:36

## 2018-10-06 RX ADMIN — INSULIN ASPART SCH: 100 INJECTION, SOLUTION INTRAVENOUS; SUBCUTANEOUS at 14:43

## 2018-10-06 RX ADMIN — ASPIRIN 325 MG ORAL TABLET SCH MG: 325 PILL ORAL at 08:13

## 2018-10-06 NOTE — P.CNPUL
History of Present Illness


Consult date: 10/06/18


Reason for consult: chest pain


Chief complaint: Preoperative pulmonary evaluation, chest pain


History of present illness: 








This is an 78-year-old gentleman who has a history of hyperlipidemia, 

hypertension,  diabetes mellitus type 2 insulin-dependent and a remote history 

of tobacco dependence which he quit over 35 years ago.  Recently, the patient 

has been complaining of progressive shortness of breath with exertion.  No 

reported chest pain the patient had a cardiac evaluation with Dr. Maier from 

cardiology who performed a stress test on the patient on 10/03/2018.  The 

stress test results showed good exercise tolerance although revealed a 1 mm ST 

segment depression with questionable inferolateral wall hypokinesis at peak 

exercise.  A 2-D echocardiogram was also completed which showed a overall left 

ventricular systolic function to be normal with an ejection fraction between 55 

and 60%, and mild tricuspid valve regurgitation.  Due to the patient's 

complaints of progressive shortness of breath and stress test results he was 

recommended to undergo a cardiac catheterization.  After consent was obtained 

Dr. Maier performed a cardiac catheterization today which demonstrated heavily 

calcified coronary arteries, with a 30-40% stenosis to his proximal and mid 

segment of his left anterior descending coronary artery, a 99% stenosis to his 

proximal left circumflex coronary artery, and a 30% stenosis to his right 

coronary artery with diffuse intimal disease.  Subsequently due to his cardiac 

catheterization findings of a 99% proximal stenosis to his circumflex coronary 

artery Dr. Maier attempted an unsuccessful PTCA/arthrectomy to the circumflex 

coronary artery due to heavy calcification.  The cardiac catheterization 

results were reviewed with the patient by Dr. Maier and a consult was placed 

to Dr. Amaro from cardiothoracic surgery for evaluation for possible myocardial 

revascularization surgery.  The patient is scheduled now to undergo a cardiac 

bypass surgery next week on Tuesday.  In terms of the primary status, the 

patient is doing well.  No respiratory difficulties.  No cough sputum 

production chest tightness or wheezing.  He is not action dependent.  He does 

not use any form of respiratory medications on outpatient basis.  His chest x-

ray is within normal limits.  CAT scan of the chest was also done and it showed 

no significant abnormalities other than bilateral gynecomastia and coronary 

artery calcifications.  The patient is hemodynamically stable at this point.  

The patient has no specific complaints.  No issues and the tentative plan is to 

discharge this patient home to be readmitted on Tuesday for cardiac bypass 

surgery.








Review of Systems


Constitutional: Denies chills, Denies fever


Eyes: denies blurred vision, denies bulging eye, denies decreased vision


Ears: deny: decreased hearing, ear discharge, earache, tinnitus


Ears, nose, mouth and throat: Denies headache, Denies sore throat


Cardiovascular: Reports decreased exercise tolerance, Reports dyspnea on 

exertion


Respiratory: Reports dyspnea


Gastrointestinal: Denies abdominal pain, Denies diarrhea, Denies nausea, Denies 

vomiting


Genitourinary: Reports as per HPI


Musculoskeletal: Reports as per HPI


Musculoskeletal: absent: ankle pain, ankle stiffness, ankle swelling


Integumentary: Denies pruritus, Denies rash


Neurological: Reports as per HPI


Psychiatric: Denies anxiety, Denies depression


Endocrine: Reports as per HPI


Hematologic/Lymphatic: Reports as per HPI


Allergic/Immunologic: Reports as per HPI





Past Medical History


Past Medical History: Coronary Artery Disease (CAD), Diabetes Mellitus, 

Hyperlipidemia, Hypertension


Additional Past Medical History / Comment(s): BPH


History of Any Multi-Drug Resistant Organisms: None Reported


Past Surgical History: Appendectomy, Joint Replacement, Tonsillectomy


Additional Past Surgical History / Comment(s): left knee replaced, right 

rotator cuff SX, COLONOSCOPY,


Past Anesthesia/Blood Transfusion Reactions: No Reported Reaction


Past Psychological History: No Psychological Hx Reported


Smoking Status: Former smoker


Past Alcohol Use History: Occasional


Additional Past Alcohol Use History / Comment(s): Patient was a smoker of one 

pack per day and QUIT SMOKING 35 YRS AGO.  He drinks approximately 1 beer/

3months.


Past Drug Use History: None Reported





- Past Family History


  ** Mother


Family Medical History: No Reported History


Additional Family Medical History / Comment(s): Mother had history of 

congenital heart disease, CHF.





  ** Father


Family Medical History: No Reported History, CVA/TIA


Additional Family Medical History / Comment(s): Father contracted malaria.  

History of CVA.





  ** Brother(s)


Family Medical History: No Reported History


Additional Family Medical History / Comment(s): Brother has history of 

headaches.





  ** Sister(s)


Family Medical History: No Reported History





  ** Daughter(s)


Family Medical History: No Reported History


Additional Family Medical History / Comment(s): Patient has one daughter with 

no major medical problems.





  ** Son(s)


Family Medical History: No Reported History


Additional Family Medical History / Comment(s): Patient has one son with no 

major medical problems.





Medications and Allergies


 Home Medications











 Medication  Instructions  Recorded  Confirmed  Type


 


Allopurinol [Zyloprim] 100 mg PO DAILY 12/22/14 10/05/18 History


 


Insulin Glargine,Hum.rec.anlog 80 unit SQ HS 12/22/14 10/05/18 History





[Lantus Solostar]    


 


Rosuvastatin Calcium [Crestor] 10 mg PO DAILY 12/22/14 10/05/18 History


 


sitaGLIPtin PHOS/metFORMIN HCL 1 tab PO BID 12/22/14 10/04/18 History





[Janumet 50-1,000 mg Tablet]    


 


Aspirin EC [Ecotrin Low Dose] 325 mg PO BID 06/01/16 10/05/18 History


 


Canagliflozin [Invokana] 100 mg PO DAILY 06/01/16 10/05/18 History


 


INSULIN LISPRO (HumaLOG) [humaLOG] 10 units SQ AC-TID 06/01/16 10/04/18 History


 


Lisinopril [Zestril] 2.5 mg PO DAILY 06/01/16 10/05/18 History


 


Ascorbic Acid [Vitamin C] 250 mg PO DAILY 10/04/18 10/05/18 History











 Allergies











Allergy/AdvReac Type Severity Reaction Status Date / Time


 


No Known Allergies Allergy   Verified 10/04/18 12:22














Physical Exam


Vitals: 


 Vital Signs











  Temp Pulse Pulse Resp BP Pulse Ox


 


 10/06/18 12:00   56 L   18  119/65  94 L


 


 10/06/18 08:05  97.9 F  77   18  116/55  96


 


 10/06/18 03:34  97.5 F L   80  16  128/70  95


 


 10/06/18 03:31    77  18  


 


 10/05/18 23:05  98.0 F   77  18  139/74 


 


 10/05/18 23:04     18  


 


 10/05/18 20:00  98.2 F  71  84  18  145/70  97


 


 10/05/18 16:00    68   








 Intake and Output











 10/06/18 10/06/18 10/06/18





 06:59 14:59 22:59


 


Intake Total  180 


 


Balance  180 


 


Intake:   


 


  Oral  180 


 


Other:   


 


  # Voids 1 2 


 


  Weight 97.7 kg  














The patient appeared well nourished and normally developed. Vital signs as 

documented. Head exam is unremarkable. No scleral icterus or corneal arcus 

noted. Neck is without jugular venous distension, thyromegaly, or carotid 

bruits. Carotid upstrokes are brisk bilaterally. Lungs are clear to 

auscultation and percussion. Cardiac exam reveals the PMI to be normally sized 

and situated. Rhythm is regular. First and second heart sounds normal. No 

murmurs, rubs or gallops. Abdominal exam reveals normal bowel sounds, no masses

, no organomegaly and no aortic enlargement. Extremities are nonedematous and 

both femoral and pedal pulses are normal.  Neurologically the patient is or 81 

and there is no focal logical deficits.  Psychiatrically the patient has normal 

mood and affect.Examination of the skin revealed no evidence of significant 

rashes, suspicious appearing nevi or other concerning lesions.





Results





- Laboratory Findings


CBC and BMP: 


 10/06/18 06:34





 10/06/18 06:34


PT/INR, D-dimer











PT  10.3 sec (9.0-12.0)   10/06/18  06:34    


 


INR  1.1  (<1.2)   10/06/18  06:34    








Abnormal lab findings: 


 Abnormal Labs











  10/05/18 10/05/18 10/05/18





  06:58 12:25 21:27


 


Hct   


 


Plt Count   


 


Chloride   


 


Carbon Dioxide   


 


Glucose   


 


POC Glucose (mg/dL)  156 H  111 H  220 H


 


Hemoglobin A1c   


 


AST   


 


HDL Cholesterol   














  10/06/18 10/06/18 10/06/18





  06:20 06:34 06:34


 


Hct    38.3 L


 


Plt Count    103 L


 


Chloride   


 


Carbon Dioxide   


 


Glucose   


 


POC Glucose (mg/dL)  135 H  


 


Hemoglobin A1c   6.8 H 


 


AST   


 


HDL Cholesterol   














  10/06/18 10/06/18





  06:34 11:55


 


Hct  


 


Plt Count  


 


Chloride  112 H 


 


Carbon Dioxide  20 L 


 


Glucose  130 H 


 


POC Glucose (mg/dL)   204 H


 


Hemoglobin A1c  


 


AST  66 H 


 


HDL Cholesterol  39 L 














- Diagnostic Findings


Chest x-ray: image reviewed


CT scan - chest: image reviewed





Assessment and Plan


Plan: 











Assessment





1 coronary artery disease, status post cardiac catheterization and successful 

PTCA and stenting.  The patient will be undergoing a coronary artery bypass 

surgery next week.





2 exertional dyspnea secondary to above





3 hypertension





4 hyperlipidemia





5 diabetes mellitus





6 BPH





7 osteoarthritis





8 gout





Plan





No pulmonary contraindications for surgery.  In fact the patient has no active 

pulmonary disease or disorder.  We'll obtain a bedside spirometry to assess the 

patient's FEV1 and FVC.  No signs of any oxygen desaturation.  The patient 

should undergo the coronary artery bypass surgery and I do not anticipate any 

significant pulmonary issues postop based on the current evaluation.  We'll 

provide the patient incentive spirometer.  Continue cardiac medication.  Blood 

sugar control per medicine.  We'll be following up this patient postop 

following his cardiac bypass surgery and will manage the ventilator and attend 

to any pulmonary needs should they arise.  Patient was reassured.  We'll 

continue to follow.

## 2018-10-06 NOTE — P.PN
Subjective


Progress Note Date: 10/06/18


This is an 78-year-old male patient of Dr. Christiansen.  He has underlying history of 

diabetes mellitus type 2, hypertension, BPH, hyperlipidemia, remote history of 

tobacco use.  Patient states that he went to see Dr. Christiansen for his physical and 

he complained that when he was pulling weeds he developed shortness of breath 

and he had to sit down and rest.  He underwent a stress test which was positive 

and was then scheduled for heart catheterization with Dr. Maier which found a 

99% stenosis in the proximal left circumflex, 30% stenosis of right coronaries 

artery and diffuse intimal disease.  PTCA and arthrectomy was attempted but 

unsuccessful of the circumflex lesion.  Cardiothoracic surgery was then 

consulted for 1 vessel CABG.





10/5: Patient denies any chest pain, shortness of breath, palpitations, or 

irregular heartbeat at this time.  Patient is awaiting cardiothoracic surgeon 

to evaluate for possible surgery on Monday for a 1 vessel CABG.  Discussed with 

patient plan of care and questions answered.  Labs this morning were 

unremarkable.  Blood sugars are stable related to adjustments of home diabetic 

regime and a sliding scale while in the hospital.








Objective





- Vital Signs


Vital signs: 


 Vital Signs











Temp  97.9 F   10/06/18 08:05


 


Pulse  77   10/06/18 08:05


 


Resp  18   10/06/18 08:05


 


BP  116/55   10/06/18 08:05


 


Pulse Ox  96   10/06/18 08:05








 Intake & Output











 10/05/18 10/06/18 10/06/18





 18:59 06:59 18:59


 


Intake Total 692 1000 180


 


Output Total 1000  


 


Balance -308 1000 180


 


Weight 99.7 kg 97.7 kg 


 


Intake:   


 


    


 


  Intake, IV Titration 100 1000 





  Amount   


 


    Sodium Chloride 0.9% 1, 100 1000 





    000 ml @ 100 mls/hr IV .   





    Q10H CHEMO Rx#:[id number]   


 


  Oral 240  180


 


Output:   


 


  Urine 1000  


 


Other:   


 


  # Voids  1 














- Exam


Gen: This is an 78-year-old male, no acute distress noted, cooperative, obese


HEENT: Head is atraumatic, normocephalic. Pupils equal, round. Sclerae is 

anicteric. 


NECK: Supple. No JVD. No lymphadenopathy. No thyromegaly. 


LUNGS: Clear to auscultation. No wheezes or rhonchi.  No intercostal 

retractions.


HEART: Regular rate and rhythm. No murmur. 


ABDOMEN: Soft. Bowel sounds are present. No masses.  No tenderness.


EXTREMITIES: No pedal edema.  No calf tenderness.


NEUROLOGICAL: Patient is awake, alert and oriented x3. Cranial nerves 2 through 

12 are grossly intact. 








- Labs


CBC & Chem 7: 


 10/06/18 06:34





 10/06/18 06:34


Labs: 


 Abnormal Lab Results - Last 24 Hours (Table)











  10/05/18 10/05/18 10/06/18 Range/Units





  12:25 21:27 06:20 


 


Hct     (39.0-53.0)  %


 


Plt Count     (150-450)  k/uL


 


Chloride     ()  mmol/L


 


Carbon Dioxide     (22-30)  mmol/L


 


Glucose     (74-99)  mg/dL


 


POC Glucose (mg/dL)  111 H  220 H  135 H  (75-99)  mg/dL


 


AST     (17-59)  U/L


 


HDL Cholesterol     (40-60)  mg/dL














  10/06/18 10/06/18 Range/Units





  06:34 06:34 


 


Hct  38.3 L   (39.0-53.0)  %


 


Plt Count  103 L   (150-450)  k/uL


 


Chloride   112 H  ()  mmol/L


 


Carbon Dioxide   20 L  (22-30)  mmol/L


 


Glucose   130 H  (74-99)  mg/dL


 


POC Glucose (mg/dL)    (75-99)  mg/dL


 


AST   66 H  (17-59)  U/L


 


HDL Cholesterol   39 L  (40-60)  mg/dL








 Microbiology - Last 24 Hours (Table)











 10/05/18 19:30 Nasal Screen MRSA/MSSA - Preliminary





 Nasal Swab 


 


 10/05/18 19:30 Urine Culture - Preliminary





 Urine,Voided 














Assessment and Plan


Plan: 


1.  Coronary artery disease with plan for 1 vessel CABG.  Cardiothoracic 

surgery team on consult.  Continue aspirin daily, Lipitor 20 mg daily





2.  Diabetes mellitus type 2.  Patient is normally on Lantus 80 units which 

will be decreased to 45 units at bedtime, NovoLog normally 10 units will be 

decreased to 5 units 3 times daily with meals and NovoLog scale.  Invokana, 

Janumet will be discontinued.  Awaiting results of hemoglobin A1c.





3.  Hypertension.  Continue lisinopril 2.5 mg daily





4.  Hyperlipidemia.  Continue Lipitor.





5.  Benign prostatic hypertrophy, stable.





6.  Osteoarthritis, generalized, stable.





7.  Remote history of tobacco use.





8.  Gout, stable.  Continue allopurinol 100 mg daily.





9.  GI prophylaxis.  Pepcid.





10.0 SSI prophylactics.  mupirocin





Impression and plan of care have been directed as dictated by the signing 

physician.  Mary Lucio nurse practitioner acting as scribe for signing 

physician.

## 2018-10-06 NOTE — CT
EXAMINATION TYPE: CT chest wo con

 

DATE OF EXAM: 10/6/2018

 

COMPARISON:

 

HISTORY: pre-op scan

 

CT DLP: 522.1 mGycm.  Automated Exposure Control for Dose Reduction was Utilized.

 

TECHNIQUE:  CT scan of the thorax is performed without IV contrast.

 

FINDINGS:The lungs are clear.

 

There is bilateral gynecomastia.

 

There is no significant axillary adenopathy. There is some shotty mediastinal adenopathy. No definite
 hilar adenopathy is seen.

 

There is no pleural or pericardial fluid. The heart is not enlarged.

 

There is vascular calcification including the coronary arteries.

 

Visualized portions of the upper abdomen are unremarkable.

 

There is hypertrophic spondylosis within the spine.

 

IMPRESSION: 

1. Bilateral gynecomastia.

2. Arterial calcification including the coronary arteries.

## 2018-10-06 NOTE — XR
EXAMINATION TYPE: XR chest 2V

 

DATE OF EXAM: 10/6/2018

 

HISTORY: PreOp Cardiac Surgery.

 

REFERENCE: Previous study dated 11/30/2016.

 

FINDINGS: Heart size is upper limits of normal. The lungs are clear. Pleural spaces are clear.

 

IMPRESSION: 

 

BORDERLINE CARDIOMEGALY.

## 2018-10-06 NOTE — P.PN
Subjective


Progress Note Date: 10/06/18


Principal diagnosis: 





Coronary artery disease, diabetes mellitus type 2 insulin-dependent, 

hypertension, hyperlipidemia, BPH, remote history of back or dependence quit 35 

years ago.





The patient is up ambulating in his room.  He denies any complaints of pain or 

shortness of breath at this time.  Dr. Sondra vazquez met with him this morning and 

discussed the risks and benefits of myocardial revascularization surgery.  He 

remains afebrile.  Bedside FEV1 was completed which showed a value of 82% of 

predicted.  He is achieving 2200 mL on his incentive spirometry.  He wishes to 

proceed with myocardial revascularization surgery which will be scheduled for 

Tuesday 10/09/2018.





Objective





- Vital Signs


Vital signs: 


 Vital Signs











Temp  97.5 F L  10/06/18 03:34


 


Pulse  80   10/06/18 03:34


 


Resp  16   10/06/18 03:34


 


BP  128/70   10/06/18 03:34


 


Pulse Ox  95   10/06/18 03:34








 Intake & Output











 10/05/18 10/06/18 10/06/18





 18:59 06:59 18:59


 


Intake Total 692 1000 180


 


Output Total 1000  


 


Balance -308 1000 180


 


Weight 99.7 kg 97.7 kg 


 


Intake:   


 


    


 


  Intake, IV Titration 100 1000 





  Amount   


 


    Sodium Chloride 0.9% 1, 100 1000 





    000 ml @ 100 mls/hr IV .   





    Q10H CHEMO Rx#:[id number]   


 


  Oral 240  180


 


Output:   


 


  Urine 1000  


 


Other:   


 


  # Voids  1 














- Constitutional


General appearance: Present: cooperative, no acute distress, obese





- Respiratory


Details: 





Lung sounds are essentially clear throughout.  Respirations are symmetrical and 

nonlabored.  Oxygen saturation is are 95% on room air.  He is achieving 2200 mL 

on his incentive spirometry.





- Cardiovascular


Details: 





Regular rhythm and rate.  S1 and S2 present, negative for S3, gallop or murmur.

  Remote telemetry showing normal sinus rhythm heart rate 75.  No edema 

present.  Right radial heart cath site clean dry and intact.  No redness or 

drainage present.  Palpable radial and ulnar pulse.





- Gastrointestinal


Gastrointestinal Comment(s): 





Abdomen is soft, nontender and nondistended.  Active bowel sounds to all 4 

abdominal quadrants.  Tolerating oral intake.  Passing flatus.





- Genitourinary


Genitourinary Comment(s): 





Voiding clear yellow urine.





- Integumentary


Integumentary Comment(s): 





Skin is warm and dry.  No clubbing or cyanosis present.  No rash or abnormal 

pigmentation present.











- Neurologic


Neurologic Comment(s): 





No focal deficits.


Neurologic: Present: CNII-XII intact





- Musculoskeletal


Musculoskeletal: Present: gait normal, strength equal bilaterally





- Psychiatric


Psychiatric: Present: A&O x's 3, appropriate affect, intact judgment & insight





- Allied health notes


Allied health notes reviewed: nursing





- Labs


CBC & Chem 7: 


 10/06/18 06:34





 10/06/18 06:34


Labs: 


 Abnormal Lab Results - Last 24 Hours (Table)











  10/05/18 10/05/18 10/06/18 Range/Units





  12:25 21:27 06:20 


 


Hct     (39.0-53.0)  %


 


Plt Count     (150-450)  k/uL


 


Chloride     ()  mmol/L


 


Carbon Dioxide     (22-30)  mmol/L


 


Glucose     (74-99)  mg/dL


 


POC Glucose (mg/dL)  111 H  220 H  135 H  (75-99)  mg/dL


 


AST     (17-59)  U/L


 


HDL Cholesterol     (40-60)  mg/dL














  10/06/18 10/06/18 Range/Units





  06:34 06:34 


 


Hct  38.3 L   (39.0-53.0)  %


 


Plt Count  103 L   (150-450)  k/uL


 


Chloride   112 H  ()  mmol/L


 


Carbon Dioxide   20 L  (22-30)  mmol/L


 


Glucose   130 H  (74-99)  mg/dL


 


POC Glucose (mg/dL)    (75-99)  mg/dL


 


AST   66 H  (17-59)  U/L


 


HDL Cholesterol   39 L  (40-60)  mg/dL








 Microbiology - Last 24 Hours (Table)











 10/05/18 19:30 Nasal Screen MRSA/MSSA - Preliminary





 Nasal Swab 


 


 10/05/18 19:30 Urine Culture - Preliminary





 Urine,Voided 














- Imaging and Cardiology


Chest x-ray: report reviewed, image reviewed





Vein mapping results reviewed, carotid duplex study results reviewed.





Assessment and Plan


(1) Coronary artery disease


Current Visit: Yes   Status: Acute   Code(s): I25.10 - ATHSCL HEART DISEASE OF 

NATIVE CORONARY ARTERY W/O ANG PCTRS   SNOMED Code(s): 57881344


   





(2) Hypertension


Current Visit: Yes   Status: Acute   Code(s): I10 - ESSENTIAL (PRIMARY) 

HYPERTENSION   SNOMED Code(s): 33765954


   





(3) Hyperlipidemia


Current Visit: Yes   Status: Acute   Code(s): E78.5 - HYPERLIPIDEMIA, 

UNSPECIFIED   SNOMED Code(s): 91818104


   





(4) Insulin dependent diabetes mellitus


Current Visit: Yes   Status: Acute   Code(s): E11.9 - TYPE 2 DIABETES MELLITUS 

WITHOUT COMPLICATIONS; Z79.4 - LONG TERM (CURRENT) USE OF INSULIN   SNOMED Code(

s): 10492315


   





(5) Tobacco dependence in remission


Current Visit: Yes   Status: Acute   Code(s): F17.201 - NICOTINE DEPENDENCE, 

UNSPECIFIED, IN REMISSION   SNOMED Code(s): 851334052


   





(6) BPH (benign prostatic hyperplasia)


Current Visit: Yes   Status: Acute   Code(s): N40.0 - BENIGN PROSTATIC 

HYPERPLASIA WITHOUT LOWER URINRY TRACT SYMP   SNOMED Code(s): 148329600


   


Plan: 





1.  Continue preoperative teaching.


2.  Preoperative workup in progress.


3.  STS risk score calculated and was discussed with the patient by Dr. Amaro.


4.  Walk test completed time 1: 3.75 seconds, Time 2: 3.46 seconds, Time 3: 

3.23 seconds.


5.  The patient will be scheduled for myocardial revascularization surgery with 

LIMA possible EVH, intraoperative transesophageal echocardiogram and epi-aortic 

ultrasound on Tuesday, 10/09/2018 to be performed by Dr. Amaro.


6.  CT scan of the chest will be completed today to evaluate his aorta.


7.  Encourage use of his incentive spirometry every hour while awake.


8.  Dr. Darden from pulmonary medicine will be consulted for preoperative 

evaluation.


9.  Medical management per Dr. Love's recommendations.


10.  More recommendations to follow based on patient's clinical course.


Time with Patient: Greater than 30

## 2018-10-07 VITALS — DIASTOLIC BLOOD PRESSURE: 75 MMHG | RESPIRATION RATE: 16 BRPM | SYSTOLIC BLOOD PRESSURE: 119 MMHG | HEART RATE: 63 BPM

## 2018-10-07 VITALS — TEMPERATURE: 97.5 F

## 2018-10-07 LAB
GLUCOSE BLD-MCNC: 159 MG/DL (ref 75–99)
GLUCOSE BLD-MCNC: 160 MG/DL (ref 75–99)

## 2018-10-07 RX ADMIN — INSULIN ASPART SCH UNIT: 100 INJECTION, SOLUTION INTRAVENOUS; SUBCUTANEOUS at 12:39

## 2018-10-07 RX ADMIN — ASPIRIN 325 MG ORAL TABLET SCH MG: 325 PILL ORAL at 08:43

## 2018-10-07 RX ADMIN — ALLOPURINOL SCH MG: 100 TABLET ORAL at 08:42

## 2018-10-07 RX ADMIN — OXYCODONE HYDROCHLORIDE AND ACETAMINOPHEN SCH MG: 500 TABLET ORAL at 08:44

## 2018-10-07 RX ADMIN — FAMOTIDINE SCH MG: 20 TABLET, FILM COATED ORAL at 08:43

## 2018-10-07 RX ADMIN — INSULIN ASPART SCH UNIT: 100 INJECTION, SOLUTION INTRAVENOUS; SUBCUTANEOUS at 06:39

## 2018-10-07 RX ADMIN — INSULIN ASPART SCH UNIT: 100 INJECTION, SOLUTION INTRAVENOUS; SUBCUTANEOUS at 12:38

## 2018-10-07 RX ADMIN — INSULIN ASPART SCH UNIT: 100 INJECTION, SOLUTION INTRAVENOUS; SUBCUTANEOUS at 06:38

## 2018-10-07 NOTE — P.PN
Subjective


Progress Note Date: 10/07/18


Principal diagnosis: 


Coronary artery disease





This is an 78-year-old gentleman who has a history of hyperlipidemia, 

hypertension,  diabetes mellitus type 2 insulin-dependent and a remote history 

of tobacco dependence which he quit over 35 years ago.  Recently, the patient 

has been complaining of progressive shortness of breath with exertion.  No 

reported chest pain the patient had a cardiac evaluation with Dr. Maier from 

cardiology who performed a stress test on the patient on 10/03/2018.  The 

stress test results showed good exercise tolerance although revealed a 1 mm ST 

segment depression with questionable inferolateral wall hypokinesis at peak 

exercise.  A 2-D echocardiogram was also completed which showed a overall left 

ventricular systolic function to be normal with an ejection fraction between 55 

and 60%, and mild tricuspid valve regurgitation.  Due to the patient's 

complaints of progressive shortness of breath and stress test results he was 

recommended to undergo a cardiac catheterization.  After consent was obtained 

Dr. Maier performed a cardiac catheterization today which demonstrated heavily 

calcified coronary arteries, with a 30-40% stenosis to his proximal and mid 

segment of his left anterior descending coronary artery, a 99% stenosis to his 

proximal left circumflex coronary artery, and a 30% stenosis to his right 

coronary artery with diffuse intimal disease.  Subsequently due to his cardiac 

catheterization findings of a 99% proximal stenosis to his circumflex coronary 

artery Dr. Maier attempted an unsuccessful PTCA/arthrectomy to the circumflex 

coronary artery due to heavy calcification.  The cardiac catheterization 

results were reviewed with the patient by Dr. Maier and a consult was placed 

to Dr. Amaro from cardiothoracic surgery for evaluation for possible myocardial 

revascularization surgery.  The patient is scheduled now to undergo a cardiac 

bypass surgery next week on Tuesday.  In terms of the primary status, the 

patient is doing well.  No respiratory difficulties.  No cough sputum 

production chest tightness or wheezing.  He is not action dependent.  He does 

not use any form of respiratory medications on outpatient basis.  His chest x-

ray is within normal limits.  CAT scan of the chest was also done and it showed 

no significant abnormalities other than bilateral gynecomastia and coronary 

artery calcifications.  The patient is hemodynamically stable at this point.  

The patient has no specific complaints.  No issues and the tentative plan is to 

discharge this patient home to be readmitted on Tuesday for cardiac bypass 

surgery.





On 10/07/2018 patient seen in follow-up on selective care unit, no shortness of 

breath or chest pain.  Patient was seen by cardiothoracic surgery, and decided 

to proceed with medical treatment at this time.  Room air pulse ox is 95%, 

patient is afebrile, tolerating ambulation. Lung  Sounds are clear, no rhonchi 

or wheezes or rales.  Anticipate discharge home today








Objective





- Vital Signs


Vital signs: 


 Vital Signs











Temp  97.5 F L  10/07/18 08:30


 


Pulse  63   10/07/18 11:15


 


Resp  16   10/07/18 11:15


 


BP  119/75   10/07/18 11:15


 


Pulse Ox  95   10/07/18 11:15








 Intake & Output











 10/06/18 10/07/18 10/07/18





 18:59 06:59 18:59


 


Intake Total 180 480 


 


Balance 180 480 


 


Weight  97 kg 


 


Intake:   


 


  Oral 180 480 


 


Other:   


 


  # Voids 2 1 














- Exam





The patient appeared well nourished and normally developed. Vital signs as 

documented. Head exam is unremarkable. No scleral icterus or corneal arcus 

noted. Neck is without jugular venous distension, thyromegaly, or carotid 

bruits. Carotid upstrokes are brisk bilaterally. Lungs are clear to 

auscultation and percussion. Cardiac exam reveals the PMI to be normally sized 

and situated. Rhythm is regular. First and second heart sounds normal. No 

murmurs, rubs or gallops. Abdominal exam reveals normal bowel sounds, no masses

, no organomegaly and no aortic enlargement. Extremities are nonedematous and 

both femoral and pedal pulses are normal.  Neurologically the patient is or 81 

and there is no focal logical deficits.  Psychiatrically the patient has normal 

mood and affect.Examination of the skin revealed no evidence of significant 

rashes, suspicious appearing nevi or other concerning lesions.








- Labs


CBC & Chem 7: 


 10/06/18 06:34





 10/06/18 06:34


Labs: 


 Abnormal Lab Results - Last 24 Hours (Table)











  10/06/18 10/06/18 10/06/18 Range/Units





  11:10 17:02 20:48 


 


POC Glucose (mg/dL)   173 H  178 H  (75-99)  mg/dL


 


Crossmatch  See Detail    














  10/07/18 10/07/18 Range/Units





  05:50 12:04 


 


POC Glucose (mg/dL)  160 H  159 H  (75-99)  mg/dL


 


Crossmatch    








 Microbiology - Last 24 Hours (Table)











 10/05/18 19:30 Nasal Screen MRSA/MSSA - Final





 Nasal Swab 


 


 10/05/18 19:30 Urine Culture - Final





 Urine,Voided 














Assessment and Plan


Plan: 





1 coronary artery disease, status post cardiac catheterization and successful 

PTCA and stenting.  The patient will be undergoing a coronary artery bypass 

surgery next week.





2 exertional dyspnea secondary to above





3 hypertension





4 hyperlipidemia





5 diabetes mellitus





6 BPH





7 osteoarthritis





8 gout





Plan:





Patient is stable from pulmonary standpoint, no active chest pain, no shortness 

of breath, he is tolerating ambulation, vital signs are stable, he was seen by 

cardiothoracic surgery, and the plan is to proceed with medical treatment only 

at this time.  Patient is stable for discharge home today pulmonary perspective





I performed a history & physical examination of the patient and discussed their 

management with my nurse practitioner, Tammy Olguin.  I reviewed the nurse 

practitioner's note and agree with the documented findings and plan of care.  

Lung sounds are clear  The findings and the impression was discussed with the 

patient.  I attest to the documentation by the nurse practitioner. 


Time with Patient: Less than 30

## 2018-10-07 NOTE — P.DS
Providers


Expected date of discharge: 10/07/18


Attending physician: 


Brandon Maier





Consults: 





 





10/05/18 10:01


Consult Physician Routine 


   Consulting Provider: Steffanie Gee


   Consult Reason/Comments: cabg


   Do you want consulting provider notified?: Yes





10/05/18 10:42


Consult Physician Routine 


   Consulting Provider: Maco Christiansen


   Consult Reason/Comments: medical management


   Do you want consulting provider notified?: Yes





10/06/18 10:38


Consult Physician Routine 


   Consulting Provider: Jaya Darden


   Consult Reason/Comments: Pulmonary management


   Do you want consulting provider notified?: Already Contacted











Primary care physician: 


Maco Christiansen





[facility] Course: 


This is an 78-year-old male patient of Dr. Christiansen.  He has underlying history of 

diabetes mellitus type 2, hypertension, BPH, hyperlipidemia, remote history of 

tobacco use.  Patient states that he went to see Dr. Christiansen for his physical and 

he complained that when he was pulling weeds he developed shortness of breath 

and he had to sit down and rest.  He underwent a stress test which was positive 

and was then scheduled for heart catheterization with Dr. Maier which found a 

99% stenosis in the proximal left circumflex, 30% stenosis of right coronaries 

artery and diffuse intimal disease.  PTCA and arthrectomy was attempted but 

unsuccessful of the circumflex lesion.  Cardiothoracic surgery was then 

consulted for 1 vessel CABG.





10/5: Patient denies any chest pain, shortness of breath, palpitations, or 

irregular heartbeat at this time.  Patient is awaiting cardiothoracic surgeon 

to evaluate for possible surgery on Monday for a 1 vessel CABG.  Discussed with 

patient plan of care and questions answered.  Labs this morning were 

unremarkable.  Blood sugars are stable related to adjustments of home diabetic 

regime and a sliding scale while in the hospital.





10/6: Patient is resting comfortably in his room.  Patient denies any chest pain

, shortness of breath, palpitations, or irregular heartbeats.  Patient was seen 

by cardiothoracic for evaluation for possible 1 vessel CABG.  The decision was 

made to treat with optimal medical therapy at this time related to the single-

vessel is the circumflex.  Case was discussed with Dr. Maier.  Patient to be 

discharged home today with continuation of previously home me patient and the 

addition of metoprolol 25 mg twice a day and sublingual nitro.





Plan:


1.  Chest pain secondary to coronary artery disease, failed PTCA of circumflex, 

CABG consult for 1 vessel deferred at this time, will optimize medical 

management.  Continue with aspirin daily, Lipitor 25 mg daily, and metoprolol 

25 mg twice a day


2.  Diabetes mellitus type 2.  Resume Lantus 80 units at bedtime, NovoLog 10 

units 3 times a day with meals.  Resume Invokana and Janumet.  Hemoglobin A1c 

6.8.


3.  Hypertension.  Continue lisinopril 2.5 mg daily


4.  Hyperlipidemia.  Continue Lipitor.


5.  Benign prostatic hypertrophy, stable.


6.  Osteoarthritis, generalized, stable.


7.  Remote history of tobacco use.


8.  Gout, stable.  Continue allopurinol 100 mg daily.





Disposition: Home with self-care





CC: Dr. Christiansen and Dr. Maier





Impression and plan of care have been directed as dictated by the signing 

physician.  Mary Lucio nurse practitioner acting as scribe for signing 

physician.








Patient Condition at Discharge: Fair





Plan - Discharge Summary


Discharge Rx Participant: Yes


New Discharge Prescriptions: 


New


   Lisinopril [Zestril] 2.5 mg PO DAILY  tab


   Metoprolol Tartrate [Lopressor] 25 mg PO BID #60 tab


   Nitroglycerin Sl Tabs [Nitrostat] 0.4 mg SUBLINGUAL Q5M PRN #30 tab


     PRN Reason: Chest Pain





Continue


   sitaGLIPtin PHOS/metFORMIN HCL [Janumet 50-1,000 mg Tablet] 1 tab PO BID


   Rosuvastatin Calcium [Crestor] 10 mg PO DAILY


   Allopurinol [Zyloprim] 100 mg PO DAILY


   Insulin Glargine,Hum.rec.anlog [Lantus Solostar] 80 unit SQ HS


   INSULIN LISPRO (HumaLOG) [humaLOG] 10 units SQ AC-TID


   Canagliflozin [Invokana] 100 mg PO DAILY


   Aspirin EC [Ecotrin Low Dose] 325 mg PO BID


   Ascorbic Acid [Vitamin C] 250 mg PO DAILY





Discontinued


   Lisinopril [Zestril] 2.5 mg PO DAILY


Discharge Medication List





Allopurinol [Zyloprim] 100 mg PO DAILY 12/22/14 [History]


Insulin Glargine,Hum.rec.anlog [Lantus Solostar] 80 unit SQ HS 12/22/14 [History

]


Rosuvastatin Calcium [Crestor] 10 mg PO DAILY 12/22/14 [History]


sitaGLIPtin PHOS/metFORMIN HCL [Janumet 50-1,000 mg Tablet] 1 tab PO BID 12/22/ 14 [History]


Aspirin EC [Ecotrin Low Dose] 325 mg PO BID 06/01/16 [History]


Canagliflozin [Invokana] 100 mg PO DAILY 06/01/16 [History]


INSULIN LISPRO (HumaLOG) [humaLOG] 10 units SQ AC-TID 06/01/16 [History]


Ascorbic Acid [Vitamin C] 250 mg PO DAILY 10/04/18 [History]


Lisinopril [Zestril] 2.5 mg PO DAILY  tab 10/07/18 [Rx]


Metoprolol Tartrate [Lopressor] 25 mg PO BID #60 tab 10/07/18 [Rx]


Nitroglycerin Sl Tabs [Nitrostat] 0.4 mg SUBLINGUAL Q5M PRN #30 tab 10/07/18 [Rx

]








Follow up Appointment(s)/Referral(s): 


Maco Christiansen MD [Primary Care Provider] - 1 Week


Brandon Maier MD [STAFF PHYSICIAN] - 1 Week


Discharge Disposition: HOME SELF-CARE

## 2018-10-07 NOTE — P.PN
<Ray Kruger - Last Filed: 10/07/18 10:15>





Subjective


Progress Note Date: 10/07/18


Principal diagnosis: 





Coronary artery disease, diabetes mellitus type 2 insulin-dependent, 

hypertension, hyperlipidemia, BPH, remote history of back or dependence quit 35 

years ago.





The patient is sitting up to the bedside edge.  He denies any complaints of 

pain or shortness of breath at this time.  The patient reports that he has been 

ambulating in the [address] [place] [address] without difficulty.  He is 

tolerating oral intake.  He is achieving 2200 mL on his incentive spirometry.  

Room air oxygen saturation is 95%.





Objective





- Vital Signs


Vital signs: 


 Vital Signs











Temp  97.8 F   10/07/18 04:00


 


Pulse  57 L  10/07/18 04:00


 


Resp  16   10/07/18 04:00


 


BP  121/69   10/07/18 04:00


 


Pulse Ox  95   10/07/18 04:00








 Intake & Output











 10/06/18 10/07/18 10/07/18





 18:59 06:59 18:59


 


Intake Total 180 480 


 


Balance 180 480 


 


Weight  97 kg 


 


Intake:   


 


  Oral 180 480 


 


Other:   


 


  # Voids 2 1 














- Constitutional


General appearance: Present: cooperative, no acute distress, obese





- Respiratory


Details: 





Lung sounds are essentially clear throughout.  Respirations are symmetrical and 

nonlabored.  Oxygen saturation are 95% on room air.  He is achieving 2200 mL on 

his incentive spirometry.  Bedside FEV1 82% of predicted value.





- Cardiovascular


Details: 





Regular rhythm and rate.  S1 and S2 present, negative for S3, gallop or murmur.

  Remote telemetry showing sinus bradycardia heart rate 57.  No edema present.





- Gastrointestinal


Gastrointestinal Comment(s): 





Abdomen is soft, nontender and nondistended.  Active bowel sounds to all 4 

abdominal quadrants.  No guarding or rigidity.  No organomegaly.





- Genitourinary


Genitourinary Comment(s): 





Voiding clear yellow urine.





- Integumentary


Integumentary Comment(s): 





Skin is warm and dry.  No clubbing or cyanosis present.  No rash or abnormal 

pigmentation present.





- Neurologic


Neurologic Comment(s): 





No focal deficits.


Neurologic: Present: CNII-XII intact





- Musculoskeletal


Musculoskeletal: Present: gait normal, strength equal bilaterally





- Psychiatric


Psychiatric: Present: A&O x's 3, appropriate affect, intact judgment & insight





- Allied health notes


Allied health notes reviewed: nursing





- Labs


CBC & Chem 7: 


 10/06/18 06:34





 10/06/18 06:34


Labs: 


 Abnormal Lab Results - Last 24 Hours (Table)











  10/06/18 10/06/18 10/06/18 Range/Units





  06:34 11:10 11:55 


 


POC Glucose (mg/dL)    204 H  (75-99)  mg/dL


 


Hemoglobin A1c  6.8 H    (4.0-6.0)  %


 


Crossmatch   See Detail   














  10/06/18 10/06/18 10/07/18 Range/Units





  17:02 20:48 05:50 


 


POC Glucose (mg/dL)  173 H  178 H  160 H  (75-99)  mg/dL


 


Hemoglobin A1c     (4.0-6.0)  %


 


Crossmatch     








 Microbiology - Last 24 Hours (Table)











 10/05/18 19:30 Urine Culture - Final





 Urine,Voided 














- Imaging and Cardiology


Chest x-ray: report reviewed, image reviewed





Assessment and Plan


(1) Coronary artery disease


Status: Acute   Code(s): I25.10 - ATHSCL HEART DISEASE OF NATIVE CORONARY 

ARTERY W/O ANG PCTRS   SNOMED Code(s): 42158974


   





(2) Hypertension


Status: Acute   Code(s): I10 - ESSENTIAL (PRIMARY) HYPERTENSION   SNOMED Code(s)

: 24091802


   





(3) Hyperlipidemia


Status: Acute   Code(s): E78.5 - HYPERLIPIDEMIA, UNSPECIFIED   SNOMED Code(s): 

68117384


   





(4) Insulin dependent diabetes mellitus


Status: Acute   Code(s): E11.9 - TYPE 2 DIABETES MELLITUS WITHOUT COMPLICATIONS

; Z79.4 - LONG TERM (CURRENT) USE OF INSULIN   SNOMED Code(s): 67917044


   





(5) Tobacco dependence in remission


Status: Acute   Code(s): F17.201 - NICOTINE DEPENDENCE, UNSPECIFIED, IN 

REMISSION   SNOMED Code(s): 163022195


   





(6) BPH (benign prostatic hyperplasia)


Status: Acute   Code(s): N40.0 - BENIGN PROSTATIC HYPERPLASIA WITHOUT LOWER 

URINRY TRACT SYMP   SNOMED Code(s): 235380918


   


Plan: 





1.  In view of his one vessel coronary artery disease to the circumflex, it is 

been discussed with the patient that the initial approach to his treatment 

should be to optimize him with medical management.  The patient is in full 

agreement of this approach.  This was discussed with the patient by Dr. Amaro.


2.  Medical management per Dr. Love's recommendations.


3.  More recommendations to follow based on patient's clinical course.


Time with Patient: Greater than 30





<Darrion Amaro - Last Filed: 10/08/18 12:03>





Objective





- Vital Signs


Vital signs: 


 Vital Signs











Temp  97.5 F L  10/07/18 08:30


 


Pulse  63   10/07/18 11:15


 


Resp  16   10/07/18 11:15


 


BP  119/75   10/07/18 11:15


 


Pulse Ox  95   10/07/18 11:15














- Labs


CBC & Chem 7: 


 10/06/18 06:34





 10/06/18 06:34


Labs: 


 Abnormal Lab Results - Last 24 Hours (Table)











  10/07/18 Range/Units





  12:04 


 


POC Glucose (mg/dL)  159 H  (75-99)  mg/dL








 Microbiology - Last 24 Hours (Table)











 10/05/18 19:30 Nasal Screen MRSA/MSSA - Final





 Nasal Swab 














Assessment and Plan


Plan: 


The patient was seen and examined.  I agree with the above assessment and plan.

  Upon further review of the workup thus far and additional discussion with the 

patient himself, I think that the patient would be better served with a trial 

of optimal medical management at this time.  If his symptoms do not improve or 

if he becomes clinically worse, then we can re-discuss surgical options.  I did 

speak at length with the patient regarding my recommendation.  All his 

questions were answered.  He seems to understand.  I also spoke with Dr. Maier 

who is in agreement.

## 2018-10-07 NOTE — P.DS
Providers


Attending physician: 


Brandon Maier





Consults: 





 





10/05/18 10:01


Consult Physician Routine 


   Consulting Provider: Steffanie Gee


   Consult Reason/Comments: cabg


   Do you want consulting provider notified?: Yes





10/05/18 10:42


Consult Physician Routine 


   Consulting Provider: Maco Christiansen


   Consult Reason/Comments: medical management


   Do you want consulting provider notified?: Yes





10/06/18 10:38


Consult Physician Routine 


   Consulting Provider: Jaya Darden


   Consult Reason/Comments: Pulmonary management


   Do you want consulting provider notified?: Already Contacted











Primary care physician: 


Maco Christiansen





[facility] Course: 


This is an 78-year-old  male patient who presented to the hospital for 

cardiac catheterization.  Was found to have significant coronary artery disease 

of the circumflex with unsuccessful PTCA.  The patient was evaluated by 

cardiovascular surgery and was not considered a candidate for surgery at this 

time due to single vessel disease only.  The patient's medications have been 

optimized and he is chest pain-free.  We will add Imdur 30 mg daily to his 

medication regimen and continue his home medications.  Patient's right radial 

puncture site is clear of bleeding or hematoma.  He's been up ambulating 

without any difficulties.  He has not had any chest pain overnight and he is 

deemed stable for discharge home today.  He will follow-up with Dr. Maier in 1 

week.


Pertinent Studies: 


Cardiac catheterization.  Please see operative note for full details.





Patient Condition at Discharge: Good





Plan - Discharge Summary


Discharge Rx Participant: Yes


New Discharge Prescriptions: 


New


   Lisinopril [Zestril] 2.5 mg PO DAILY  tab


   Metoprolol Tartrate [Lopressor] 25 mg PO BID #60 tab


   Nitroglycerin Sl Tabs [Nitrostat] 0.4 mg SUBLINGUAL Q5M PRN #30 tab


     PRN Reason: Chest Pain


   Isosorbide Mononitrate ER [Imdur] 30 mg PO DAILY #30 tab





Continue


   sitaGLIPtin PHOS/metFORMIN HCL [Janumet 50-1,000 mg Tablet] 1 tab PO BID


   Rosuvastatin Calcium [Crestor] 10 mg PO DAILY


   Allopurinol [Zyloprim] 100 mg PO DAILY


   Insulin Glargine,Hum.rec.anlog [Lantus Solostar] 80 unit SQ HS


   INSULIN LISPRO (HumaLOG) [humaLOG] 10 units SQ AC-TID


   Canagliflozin [Invokana] 100 mg PO DAILY


   Aspirin EC [Ecotrin Low Dose] 325 mg PO BID


   Ascorbic Acid [Vitamin C] 250 mg PO DAILY





Discontinued


   Lisinopril [Zestril] 2.5 mg PO DAILY


Discharge Medication List





Allopurinol [Zyloprim] 100 mg PO DAILY 12/22/14 [History]


Insulin Glargine,Hum.rec.anlog [Lantus Solostar] 80 unit SQ HS 12/22/14 [History

]


Rosuvastatin Calcium [Crestor] 10 mg PO DAILY 12/22/14 [History]


sitaGLIPtin PHOS/metFORMIN HCL [Janumet 50-1,000 mg Tablet] 1 tab PO BID 12/22/ 14 [History]


Aspirin EC [Ecotrin Low Dose] 325 mg PO BID 06/01/16 [History]


Canagliflozin [Invokana] 100 mg PO DAILY 06/01/16 [History]


INSULIN LISPRO (HumaLOG) [humaLOG] 10 units SQ AC-TID 06/01/16 [History]


Ascorbic Acid [Vitamin C] 250 mg PO DAILY 10/04/18 [History]


Isosorbide Mononitrate ER [Imdur] 30 mg PO DAILY #30 tab 10/07/18 [Rx]


Lisinopril [Zestril] 2.5 mg PO DAILY  tab 10/07/18 [Rx]


Metoprolol Tartrate [Lopressor] 25 mg PO BID #60 tab 10/07/18 [Rx]


Nitroglycerin Sl Tabs [Nitrostat] 0.4 mg SUBLINGUAL Q5M PRN #30 tab 10/07/18 [Rx

]








Follow up Appointment(s)/Referral(s): 


Brandon Maier MD [STAFF PHYSICIAN] - 1 Week


Maco Christiansen MD [Primary Care Provider] - 1 Week


Discharge Disposition: HOME SELF-CARE

## 2018-10-17 NOTE — P.VSCSTY
Greater Saphenous Vein Mapping





This is bilateral lower extremity greater saphenous vein mapping.





Date of service 10/05/2018





Vein quality and ultrasound appearance no obvious wall changes or intraluminal 

thrombus.





Vein size  groin right 8 x 9                                          groin 

left 8 x 8





                High thigh right 4 x 4                                 high 

thigh left  6 x 7





                Mid thigh right  4 x 4                                  mid 

thigh left 4 x 4





                 Above-knee right  3 x 3                             above-knee 

left 4 x 4





                  Below knee right 3 x 4                                  below-

knee left 3 x 3





                   Mid calf right 3 x 3                                        

mid calf left 2 x 2





                    Ankle right 3 x 4                                           

ankle left 3 x 3





Impression usable bilateral greater saphenous vein.

## 2018-10-17 NOTE — P.ARTDOP
Arterial Doppler





LOWER EXTREMITY ARTERIAL DOPPLER:





DATE OF SERVICE: 10/05/2018





Reason for study: Preop CABG.





Doppler waveforms: Multiphasic bilaterally throughout.





Pulse volume recording: [].





Pressure gradients: None.





Ankle-brachial indices: Greater than 1 bilaterally.





Toe pressures: 140 on the right, 146 on the left





Impression: Normal study.

## 2019-07-30 ENCOUNTER — HOSPITAL ENCOUNTER (OUTPATIENT)
Dept: HOSPITAL 47 - RADUSWWP | Age: 79
Discharge: HOME | End: 2019-07-30
Attending: INTERNAL MEDICINE
Payer: MEDICARE

## 2019-07-30 DIAGNOSIS — D69.6: Primary | ICD-10-CM

## 2019-07-30 PROCEDURE — 76700 US EXAM ABDOM COMPLETE: CPT

## 2019-07-30 NOTE — US
EXAMINATION TYPE: US abdomen complete

 

DATE OF EXAM: 7/30/2019

 

COMPARISON: NONE

 

CLINICAL HISTORY: D69.6 Thrombocytopenia.

 

EXAM MEASUREMENTS:

 

Liver Length:  15.9 cm   

Gallbladder Wall:  0.2 cm   

CBD:  0.4 cm

Spleen:  15.7 cm   

Right Kidney:  11.1 x 4.9 x 4.5 cm 

Left Kidney:  11.5 x 4.6 x 4.8 cm   

 

Patient has severe overlying midline bowel gas limiting study. Technically difficult.

 

Pancreas:  Obscured by bowel gas

Liver:  Increased attenuation, decreased visualization of vessels,  suggestive of fatty infiltrate , 
limited visualization. 

Gallbladder:  wnl 

**Evidence for sonographic Mendez's sign:  no

CBD:  wnl, limited visualization

Spleen:  enlarged  

Right Kidney:  wnl   

Left Kidney:  wnl   

Upper IVC:  wnl  

Abd Aorta:  mostly obscured by overlying bowel gas, wnl as seen

 

 

The intrahepatic portion of the IVC and proximal abdominal aorta are within normal limits.  There is 
no evidence of cholelithiasis.  Common bile duct is unremarkable.  The visualized portions of the pan
creas are homogenous.  The spleen is enlarged. Kidneys are symmetric and free of hydronephrosis.  No 
renal lesions are seen.

 

IMPRESSION:

1. Hyperechoic hepatic echotexture most commonly related to hepatic steatosis. Correlate with liver f
unction tests.

2. Incidentally noted spinal megaly.

3. Somewhat limited evaluation overall due to overlying bowel gas with obscuration of the abdominal a
iesha, pancreas, and limited visualization of the liver.

## 2020-05-21 ENCOUNTER — HOSPITAL ENCOUNTER (OUTPATIENT)
Dept: HOSPITAL 47 - LABWHC1 | Age: 80
Discharge: HOME | End: 2020-05-21
Attending: INTERNAL MEDICINE
Payer: MEDICARE

## 2020-05-21 DIAGNOSIS — N40.0: ICD-10-CM

## 2020-05-21 DIAGNOSIS — E11.9: ICD-10-CM

## 2020-05-21 DIAGNOSIS — Z00.00: Primary | ICD-10-CM

## 2020-05-21 LAB
ALBUMIN SERPL-MCNC: 4.1 G/DL (ref 3.8–4.9)
ALBUMIN/GLOB SERPL: 1.86 G/DL (ref 1.6–3.17)
ALP SERPL-CCNC: 90 U/L (ref 41–126)
ALT SERPL-CCNC: 35 U/L (ref 10–49)
ANION GAP SERPL CALC-SCNC: 10 MMOL/L (ref 4–12)
AST SERPL-CCNC: 45 U/L (ref 14–35)
BASOPHILS # BLD AUTO: 0 K/UL (ref 0–0.2)
BASOPHILS NFR BLD AUTO: 1 %
BUN SERPL-SCNC: 21 MG/DL (ref 9–27)
BUN/CREAT SERPL: 23.33 RATIO (ref 12–20)
CALCIUM SPEC-MCNC: 9.4 MG/DL (ref 8.7–10.3)
CHLORIDE SERPL-SCNC: 113 MMOL/L (ref 96–109)
CO2 SERPL-SCNC: 23 MMOL/L (ref 21.6–31.8)
EOSINOPHIL # BLD AUTO: 0.2 K/UL (ref 0–0.7)
EOSINOPHIL NFR BLD AUTO: 4 %
ERYTHROCYTE [DISTWIDTH] IN BLOOD BY AUTOMATED COUNT: 4.96 M/UL (ref 4.3–5.9)
ERYTHROCYTE [DISTWIDTH] IN BLOOD: 15.4 % (ref 11.5–15.5)
GLOBULIN SER CALC-MCNC: 2.2 G/DL (ref 1.6–3.3)
GLUCOSE SERPL-MCNC: 172 MG/DL (ref 70–110)
HBA1C MFR BLD: 7.7 % (ref 4–6)
HCT VFR BLD AUTO: 43.8 % (ref 39–53)
HGB BLD-MCNC: 14.2 GM/DL (ref 13–17.5)
LYMPHOCYTES # SPEC AUTO: 1.2 K/UL (ref 1–4.8)
LYMPHOCYTES NFR SPEC AUTO: 24 %
MCH RBC QN AUTO: 28.7 PG (ref 25–35)
MCHC RBC AUTO-ENTMCNC: 32.5 G/DL (ref 31–37)
MCV RBC AUTO: 88.3 FL (ref 80–100)
MONOCYTES # BLD AUTO: 0.3 K/UL (ref 0–1)
MONOCYTES NFR BLD AUTO: 6 %
NEUTROPHILS # BLD AUTO: 3.3 K/UL (ref 1.3–7.7)
NEUTROPHILS NFR BLD AUTO: 64 %
PLATELET # BLD AUTO: 81 K/UL (ref 150–450)
POTASSIUM SERPL-SCNC: 5.1 MMOL/L (ref 3.5–5.5)
PROT SERPL-MCNC: 6.3 G/DL (ref 6.2–8.2)
SODIUM SERPL-SCNC: 146 MMOL/L (ref 135–145)
WBC # BLD AUTO: 5.1 K/UL (ref 3.8–10.6)

## 2020-05-21 PROCEDURE — 36415 COLL VENOUS BLD VENIPUNCTURE: CPT

## 2020-05-21 PROCEDURE — 80053 COMPREHEN METABOLIC PANEL: CPT

## 2020-05-21 PROCEDURE — 83036 HEMOGLOBIN GLYCOSYLATED A1C: CPT

## 2020-05-21 PROCEDURE — 84443 ASSAY THYROID STIM HORMONE: CPT

## 2020-05-21 PROCEDURE — 84153 ASSAY OF PSA TOTAL: CPT

## 2020-05-21 PROCEDURE — 82043 UR ALBUMIN QUANTITATIVE: CPT

## 2020-05-21 PROCEDURE — 82570 ASSAY OF URINE CREATININE: CPT

## 2020-05-21 PROCEDURE — 85025 COMPLETE CBC W/AUTO DIFF WBC: CPT

## 2021-04-27 ENCOUNTER — HOSPITAL ENCOUNTER (OUTPATIENT)
Dept: HOSPITAL 47 - RADXRMAIN | Age: 81
Discharge: HOME | End: 2021-04-27
Attending: INTERNAL MEDICINE
Payer: MEDICARE

## 2021-04-27 DIAGNOSIS — R10.9: Primary | ICD-10-CM

## 2021-04-27 PROCEDURE — 74018 RADEX ABDOMEN 1 VIEW: CPT

## 2021-04-27 NOTE — XR
EXAMINATION TYPE: XR KUB

 

DATE OF EXAM: 4/27/2021

 

COMPARISON: NONE

 

HISTORY: Pain

 

TECHNIQUE: Single supine KUB image of the abdomen is obtained

 

FINDINGS:  

Small bowel demonstrates no evidence for dilatation or air fluid levels.  

 

Gas and fecal material is seen in non-distended colon.  

 

No convincing evidence for pneumoperitoneum.

 

 No unusual calcifications. 

 

The lung bases are clear. 

 

The osseous structures are intact.

 

IMPRESSION:  

 

1.  Overall nonobstructive bowel gas pattern.

## 2021-05-04 ENCOUNTER — HOSPITAL ENCOUNTER (OUTPATIENT)
Dept: HOSPITAL 47 - RADCTMAIN | Age: 81
Discharge: HOME | End: 2021-05-04
Attending: INTERNAL MEDICINE
Payer: MEDICARE

## 2021-05-04 DIAGNOSIS — C67.9: Primary | ICD-10-CM

## 2021-05-04 PROCEDURE — 74178 CT ABD&PLV WO CNTR FLWD CNTR: CPT

## 2021-05-04 PROCEDURE — 74400 UROGRAPHY IV +-KUB TOMOG: CPT

## 2021-05-04 NOTE — CT
EXAMINATION TYPE: CT urogram wo/w con

 

DATE OF EXAM: 5/4/2021

 

COMPARISON: Ultrasound abdomen July 30, 2019. Abdominal x-ray April 27, 2021

 

HISTORY: Malignant neoplasm of urinary organs

 

CT DLP: 4516.50 mGycm, Automated Exposure Control for Dose Reduction was Utilized.

 

CONTRAST: 

CT scan of the abdomen and pelvis is performed without oral and without and with IV Contrast, patient
 injected with 100ml mL of Isovue 300. Urogram protocol with 3-D reconstructed images created on an [employer] workstation and reviewed.

 

FINDINGS:

 

KUB: Noncontrast images show no renal calculi bilaterally. Postcontrast images show symmetric cortica
l medullary uptake and excretion without concerning solid or cystic renal mass or hydronephrosis seen
 bilaterally. There is mild-to-moderate perinephric fluid and fat stranding seen bilaterally which is
 nonspecific finding. Satisfactory opacification of majority of bilateral ureters. There is lobulated
 mass in the bladder posterior left aspect near site of left UVJ measuring 3.1 x 3.0 cm axial image 8
2 series 14 x 3.2 cm craniocaudal dimension coronal image 27 series 18 distinct from prostate gland c
onsistent with neoplasm. Patency of the distal left ureter jet is still present. Retroaortic left aj
al vein which is normal variant.

 

LUNG BASES: Dependent atelectasis in both bases.

 

LIVER/GB:   No significant abnormality is appreciated.

 

PANCREAS: Mild generalized atrophy in the pancreatic head.

 

SPLEEN: Splenomegaly measuring 15.9 cm long axis coronal image 99  series 21.

 

ADRENALS:  No significant abnormality is seen.

 

BOWEL:   No significant abnormality is seen.

 

PROSTATE/SEMINAL VESICLES: Prostate gland upper limits of normal in size. Adjacent scattered bilatera
l pelvic phleboliths.

 

LYMPH NODES: Single enlarged upper abdominal 1.6 x 1.2 cm perigastric lymph node axial image 25.

 

OSSEOUS STRUCTURES: Vacuum disc phenomenon with moderate disc space narrowing L4-L5 and L5-S1 levels.


 

OTHER: Large umbilical hernia containing fat and tiny mesenteric vessels. Moderate calcified plaque o
f the aorta extends into branch vessels.

 

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 No hydron
ephrosis seen bilaterally. Nonspecific slightly enlarged upper abdominal lymph node noted.

## 2021-05-19 ENCOUNTER — HOSPITAL ENCOUNTER (EMERGENCY)
Dept: HOSPITAL 47 - EC | Age: 81
Discharge: HOME | End: 2021-05-19
Payer: MEDICARE

## 2021-05-19 VITALS — SYSTOLIC BLOOD PRESSURE: 159 MMHG | RESPIRATION RATE: 16 BRPM | HEART RATE: 88 BPM | DIASTOLIC BLOOD PRESSURE: 94 MMHG

## 2021-05-19 VITALS — TEMPERATURE: 97.7 F

## 2021-05-19 DIAGNOSIS — Z79.82: ICD-10-CM

## 2021-05-19 DIAGNOSIS — N13.1: Primary | ICD-10-CM

## 2021-05-19 DIAGNOSIS — I10: ICD-10-CM

## 2021-05-19 DIAGNOSIS — Z90.89: ICD-10-CM

## 2021-05-19 DIAGNOSIS — E78.5: ICD-10-CM

## 2021-05-19 DIAGNOSIS — N40.0: ICD-10-CM

## 2021-05-19 DIAGNOSIS — Z96.0: ICD-10-CM

## 2021-05-19 DIAGNOSIS — I25.10: ICD-10-CM

## 2021-05-19 DIAGNOSIS — Z79.4: ICD-10-CM

## 2021-05-19 DIAGNOSIS — E11.9: ICD-10-CM

## 2021-05-19 LAB
ALBUMIN SERPL-MCNC: 4 G/DL (ref 3.5–5)
ALP SERPL-CCNC: 83 U/L (ref 38–126)
ALT SERPL-CCNC: 29 U/L (ref 4–49)
ANION GAP SERPL CALC-SCNC: 7 MMOL/L
AST SERPL-CCNC: 46 U/L (ref 17–59)
BASOPHILS # BLD AUTO: 0 K/UL (ref 0–0.2)
BASOPHILS NFR BLD AUTO: 1 %
BUN SERPL-SCNC: 21 MG/DL (ref 9–20)
CALCIUM SPEC-MCNC: 9.4 MG/DL (ref 8.4–10.2)
CHLORIDE SERPL-SCNC: 109 MMOL/L (ref 98–107)
CO2 SERPL-SCNC: 21 MMOL/L (ref 22–30)
EOSINOPHIL # BLD AUTO: 0.1 K/UL (ref 0–0.7)
EOSINOPHIL NFR BLD AUTO: 1 %
ERYTHROCYTE [DISTWIDTH] IN BLOOD BY AUTOMATED COUNT: 5 M/UL (ref 4.3–5.9)
ERYTHROCYTE [DISTWIDTH] IN BLOOD: 14.8 % (ref 11.5–15.5)
GLUCOSE SERPL-MCNC: 201 MG/DL (ref 74–99)
HCT VFR BLD AUTO: 43.9 % (ref 39–53)
HGB BLD-MCNC: 14.4 GM/DL (ref 13–17.5)
LIPASE SERPL-CCNC: 29 U/L (ref 23–300)
LYMPHOCYTES # SPEC AUTO: 1 K/UL (ref 1–4.8)
LYMPHOCYTES NFR SPEC AUTO: 12 %
MCH RBC QN AUTO: 28.8 PG (ref 25–35)
MCHC RBC AUTO-ENTMCNC: 32.8 G/DL (ref 31–37)
MCV RBC AUTO: 87.7 FL (ref 80–100)
MONOCYTES # BLD AUTO: 0.3 K/UL (ref 0–1)
MONOCYTES NFR BLD AUTO: 4 %
NEUTROPHILS # BLD AUTO: 6.8 K/UL (ref 1.3–7.7)
NEUTROPHILS NFR BLD AUTO: 83 %
PH UR: 5.5 [PH] (ref 5–8)
PLATELET # BLD AUTO: 130 K/UL (ref 150–450)
POTASSIUM SERPL-SCNC: 4.6 MMOL/L (ref 3.5–5.1)
PROT SERPL-MCNC: 6.4 G/DL (ref 6.3–8.2)
PROT UR QL: (no result)
RBC UR QL: >182 /HPF (ref 0–5)
SODIUM SERPL-SCNC: 137 MMOL/L (ref 137–145)
SP GR UR: 1.01 (ref 1–1.03)
UROBILINOGEN UR QL STRIP: <2 MG/DL (ref ?–2)
WBC # BLD AUTO: 8.3 K/UL (ref 3.8–10.6)
WBC # UR AUTO: 26 /HPF (ref 0–5)

## 2021-05-19 PROCEDURE — 83605 ASSAY OF LACTIC ACID: CPT

## 2021-05-19 PROCEDURE — 96374 THER/PROPH/DIAG INJ IV PUSH: CPT

## 2021-05-19 PROCEDURE — 85025 COMPLETE CBC W/AUTO DIFF WBC: CPT

## 2021-05-19 PROCEDURE — 87086 URINE CULTURE/COLONY COUNT: CPT

## 2021-05-19 PROCEDURE — 81001 URINALYSIS AUTO W/SCOPE: CPT

## 2021-05-19 PROCEDURE — 36415 COLL VENOUS BLD VENIPUNCTURE: CPT

## 2021-05-19 PROCEDURE — 99284 EMERGENCY DEPT VISIT MOD MDM: CPT

## 2021-05-19 PROCEDURE — 74177 CT ABD & PELVIS W/CONTRAST: CPT

## 2021-05-19 PROCEDURE — 80053 COMPREHEN METABOLIC PANEL: CPT

## 2021-05-19 PROCEDURE — 83690 ASSAY OF LIPASE: CPT

## 2021-05-19 NOTE — CT
EXAMINATION TYPE: CT abdomen pelvis w con

 

DATE OF EXAM: 5/19/2021

 

COMPARISON: 5/4/2021

 

HISTORY: Nausea and left sided flank pain after bladder surgery today.

 

CT DLP: 1883.1 mGycm

Automated exposure control for dose reduction was used.

 

TECHNIQUE:  Helical acquisition of images was performed from the lung bases through the pelvis.

 

CONTRAST: 

Performed without Oral Contrast and with IV Contrast, patient injected with 100ml mL of Isovue 300.

 

FINDINGS: 

 

LUNG BASES: No significant abnormality is appreciated.

 

LIVER/GB: No significant abnormality is appreciated.

 

PANCREAS: No significant abnormality is seen.

 

SPLEEN: No significant abnormality is seen.

 

ADRENALS: No significant abnormality is seen.

 

KIDNEYS/URINARY BLADDER: Mild to moderate left hydroureteronephrosis without obstructing calculus see
n. Increased left perinephric fat stranding noted. Transition point is just proximal to the ureterove
sical junction. No right hydronephrosis or nephrolithiasis. Urinary bladder is decompressed by Chambers 
catheter with indistinctness of the wall. Tiny urinary bladder gas, likely procedural change.

 

FREE AIR:  No free air is visualized.

 

RETROPERITONEAL ADENOPATHY:  None visualized

 

REPRODUCTIVE ORGANS: No significant abnormality is seen

 

PELVIC ADENOPATHY:  None visualized.

 

OSSEOUS STRUCTURES:  No significant abnormality is seen.

 

BOWEL:  Small fat-containing ventral hernia, otherwise no significant abnormality is seen. No signifi
cant free fluid or air.

 

OTHER: Moderate to advanced atherosclerotic disease.

 

IMPRESSION: 

MILD TO MODERATE LEFT HYDROURETERONEPHROSIS WITH SURROUNDING INFLAMMATORY CHANGES AND WITHOUT OBSTRUC
TING CALCULUS. TRANSITION POINT IS JUST PROXIMAL TO THE UVJ AND STRICTURING CANNOT BE EXCLUDED. RECOM
MEND UROLOGIC CONSULTATION.

 

DECOMPRESSED AND INDISTINCT URINARY BLADDER WITH TINY FOCUS OF GAS, MOST COMPATIBLE WITH RECENT POSTS
URGICAL CHANGES. NO SIGNIFICANT FLUID COLLECTION.

 

Incidental findings as above.

## 2021-05-19 NOTE — ED
General Adult HPI





- General


Chief complaint: Abdominal Pain


Stated complaint: Flank Pain


Time Seen by Provider: 05/19/21 19:59


Source: patient, family


Mode of arrival: ambulatory


Limitations: no limitations





- History of Present Illness


Initial comments: 


81-year-old male patient presents to the emergency department today for 

evaluation of left-sided abdominal and flank pain.  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 Chris. States that he has indwelling 

lima catheter, urine has been dark. States a couple of hours ago he started to 

have left flank and left sided abdominal pain. States he has had intermittent 

nausea, no vomiting. Reports chills, no fever. States urine has been draining 

into the lima bag.  Patient denies any recent rash, cough, shortness of breath,

chest pain, diarrhea, constipation, back pain, numbness, tingling, dizziness, 

weakness, headache, visual changes, or any other complaints.








- Related Data


                                Home Medications











 Medication  Instructions  Recorded  Confirmed


 


Insulin Glargine,Hum.rec.anlog 80 unit SQ HS 12/22/14 10/05/18





[Lantus Solostar]   


 


Rosuvastatin Calcium [Crestor] 10 mg PO DAILY 12/22/14 10/05/18


 


allopurinoL [Zyloprim] 100 mg PO DAILY 12/22/14 10/05/18


 


sitaGLIPtin PHOS/metFORMIN HCL 1 tab PO BID 12/22/14 10/04/18





[Janumet 50-1,000 mg Tablet]   


 


Aspirin EC [Ecotrin Low Dose] 325 mg PO BID 06/01/16 10/05/18


 


Canagliflozin [Invokana] 100 mg PO DAILY 06/01/16 10/05/18


 


INSULIN LISPRO (HumaLOG) [humaLOG] 10 units SQ AC-TID 06/01/16 10/04/18


 


Ascorbic Acid [Vitamin C] 250 mg PO DAILY 10/04/18 10/05/18








                                  Previous Rx's











 Medication  Instructions  Recorded


 


Isosorbide Mononitrate ER [Imdur] 30 mg PO DAILY #30 tab 10/07/18


 


Metoprolol Tartrate [Lopressor] 25 mg PO BID #60 tab 10/07/18


 


Nitroglycerin Sl Tabs [Nitrostat] 0.4 mg SUBLINGUAL Q5M PRN #30 tab 10/07/18


 


lisinopriL [Zestril] 2.5 mg PO DAILY  tab 10/07/18


 


Acetaminophen-Codeine 300-30mg 1 tab PO Q6H PRN #12 tablet 05/19/21





[Tylenol #3]  











                                    Allergies











Allergy/AdvReac Type Severity Reaction Status Date / Time


 


No Known Allergies Allergy   Verified 05/19/21 19:40














Review of Systems


ROS Statement: 


Those systems with pertinent positive or pertinent negative responses have been 

documented in the HPI.





ROS Other: All systems not noted in ROS Statement are negative.





Past Medical History


Past Medical History: Coronary Artery Disease (CAD), Diabetes Mellitus, 

Hyperlipidemia, Hypertension


Additional Past Medical History / Comment(s): BPH


History of Any Multi-Drug Resistant Organisms: None Reported


Past Surgical History: Appendectomy, Joint Replacement, Tonsillectomy


Additional Past Surgical History / Comment(s): left knee replaced, right rotator

cuff SX, COLONOSCOPY,


Past Anesthesia/Blood Transfusion Reactions: No Reported Reaction


Past Psychological History: No Psychological Hx Reported


Past Alcohol Use History: Occasional


Past Drug Use History: None Reported





- Past Family History


  ** Mother


Family Medical History: No Reported History


Additional Family Medical History / Comment(s): Mother had history of congenital

heart disease, CHF.





  ** Father


Family Medical History: No Reported History, CVA/TIA


Additional Family Medical History / Comment(s): Father contracted malaria.  

History of CVA.





  ** Brother(s)


Family Medical History: No Reported History


Additional Family Medical History / Comment(s): Brother has history of 

headaches.





  ** Sister(s)


Family Medical History: No Reported History





  ** Daughter(s)


Family Medical History: No Reported History


Additional Family Medical History / Comment(s): Patient has one daughter with no

major medical problems.





  ** Son(s)


Family Medical History: No Reported History


Additional Family Medical History / Comment(s): Patient has one son with no 

major medical problems.





General Exam


Limitations: no limitations


General appearance: alert, in no apparent distress, other (This is a well 

developed, well nourished adult male patient in no acute distress. Vital signs 

upon presentation tempt 97.7, pulse 75, resp 18, /72, Pulse ox 95% room 

air. )


Eye exam: Present: normal appearance, PERRL, EOMI.  Absent: scleral icterus, 

conjunctival injection, periorbital swelling


ENT exam: Present: normal exam, normal oropharynx, mucous membranes moist


Respiratory exam: Present: normal lung sounds bilaterally.  Absent: respiratory 

distress, wheezes, rales, rhonchi, stridor


Cardiovascular Exam: Present: regular rate, normal rhythm, normal heart sounds. 

Absent: systolic murmur, diastolic murmur, rubs, gallop, clicks


GI/Abdominal exam: Present: soft, tenderness (Left mid abdomen), normal bowel 

sounds.  Absent: distended, guarding, rebound, rigid


Back exam: Present: normal inspection, CVA tenderness (L).  Absent: CVA 

tenderness (R)


Neurological exam: Present: alert, oriented X3, CN II-XII intact


Psychiatric exam: Present: normal affect, normal mood


Skin exam: Present: warm, dry, intact, normal color.  Absent: rash





Course


                                   Vital Signs











  05/19/21 05/19/21





  19:37 22:25


 


Temperature 97.7 F 


 


Pulse Rate 75 88


 


Respiratory 18 16





Rate  


 


Blood Pressure 142/72 159/94


 


O2 Sat by Pulse 95 96





Oximetry  














Medical Decision Making





- Medical Decision Making


81 year-old male patient presents to the emergency department for evaluation of 

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 

earlier today. Procedure was at the [facility] with Dr. Chris. 

Physical examination reveals left flank and left mid abdomen tenderness. There 

is 400ml of tea colored urine in the lima bag, this has not been drained since 

4pm.  Vitals are unremarkable.  Labs are unremarkable.  Did have a large blood 

in urine.  CT abdomen and pelvis was obtained and did reveal left-sided 

hydronephrosis with a transition point in the left distal ureter. Dr. Chris was

consulted, states that surgical site is near the left UVJ and CT findings are 

most likely due to edema. Patient was given option to stay for pain management 

or to be discharged with pain medication. He would like to try going home. He is

given script for Tylenol #3. He will call Dr. hCris' office in the morning for 

further instructions. Return parameters are discussed in detail. He verbalizes 

understanding and agrees with this plan. Case discussed with my attending Dr. Corral. 








- Lab Data


Result diagrams: 


                                 05/19/21 20:22





                                 05/19/21 20:22


                                   Lab Results











  05/19/21 05/19/21 05/19/21 Range/Units





  20:22 20:22 20:22 


 


WBC  8.3    (3.8-10.6)  k/uL


 


RBC  5.00    (4.30-5.90)  m/uL


 


Hgb  14.4    (13.0-17.5)  gm/dL


 


Hct  43.9    (39.0-53.0)  %


 


MCV  87.7    (80.0-100.0)  fL


 


MCH  28.8    (25.0-35.0)  pg


 


MCHC  32.8    (31.0-37.0)  g/dL


 


RDW  14.8    (11.5-15.5)  %


 


Plt Count  130 L    (150-450)  k/uL


 


MPV  8.3    


 


Neutrophils %  83    %


 


Lymphocytes %  12    %


 


Monocytes %  4    %


 


Eosinophils %  1    %


 


Basophils %  1    %


 


Neutrophils #  6.8    (1.3-7.7)  k/uL


 


Lymphocytes #  1.0    (1.0-4.8)  k/uL


 


Monocytes #  0.3    (0-1.0)  k/uL


 


Eosinophils #  0.1    (0-0.7)  k/uL


 


Basophils #  0.0    (0-0.2)  k/uL


 


Sodium    137  (137-145)  mmol/L


 


Potassium    4.6  (3.5-5.1)  mmol/L


 


Chloride    109 H  ()  mmol/L


 


Carbon Dioxide    21 L  (22-30)  mmol/L


 


Anion Gap    7  mmol/L


 


BUN    21 H  (9-20)  mg/dL


 


Creatinine    1.00  (0.66-1.25)  mg/dL


 


Est GFR (CKD-EPI)AfAm    80  (>60 ml/min/1.73 sqM)  


 


Est GFR (CKD-EPI)NonAf    69  (>60 ml/min/1.73 sqM)  


 


Glucose    201 H  (74-99)  mg/dL


 


Plasma Lactic Acid Owen     (0.7-2.0)  mmol/L


 


Calcium    9.4  (8.4-10.2)  mg/dL


 


Total Bilirubin    1.1  (0.2-1.3)  mg/dL


 


AST    46  (17-59)  U/L


 


ALT    29  (4-49)  U/L


 


Alkaline Phosphatase    83  ()  U/L


 


Total Protein    6.4  (6.3-8.2)  g/dL


 


Albumin    4.0  (3.5-5.0)  g/dL


 


Lipase    29  ()  U/L


 


Urine Color   Light Red   


 


Urine Appearance   Cloudy   (Clear)  


 


Urine pH   5.5   (5.0-8.0)  


 


Ur Specific Gravity   1.015   (1.001-1.035)  


 


Urine Protein   1+ H   (Negative)  


 


Urine Glucose (UA)   Negative   (Negative)  


 


Urine Ketones   Negative   (Negative)  


 


Urine Blood   Large H   (Negative)  


 


Urine Nitrite   Negative   (Negative)  


 


Urine Bilirubin   Negative   (Negative)  


 


Urine Urobilinogen   <2.0   (<2.0)  mg/dL


 


Ur Leukocyte Esterase   Moderate H   (Negative)  


 


Urine RBC   >182 H   (0-5)  /hpf


 


Urine WBC   26 H   (0-5)  /hpf














  05/19/21 Range/Units





  20:22 


 


WBC   (3.8-10.6)  k/uL


 


RBC   (4.30-5.90)  m/uL


 


Hgb   (13.0-17.5)  gm/dL


 


Hct   (39.0-53.0)  %


 


MCV   (80.0-100.0)  fL


 


MCH   (25.0-35.0)  pg


 


MCHC   (31.0-37.0)  g/dL


 


RDW   (11.5-15.5)  %


 


Plt Count   (150-450)  k/uL


 


MPV   


 


Neutrophils %   %


 


Lymphocytes %   %


 


Monocytes %   %


 


Eosinophils %   %


 


Basophils %   %


 


Neutrophils #   (1.3-7.7)  k/uL


 


Lymphocytes #   (1.0-4.8)  k/uL


 


Monocytes #   (0-1.0)  k/uL


 


Eosinophils #   (0-0.7)  k/uL


 


Basophils #   (0-0.2)  k/uL


 


Sodium   (137-145)  mmol/L


 


Potassium   (3.5-5.1)  mmol/L


 


Chloride   ()  mmol/L


 


Carbon Dioxide   (22-30)  mmol/L


 


Anion Gap   mmol/L


 


BUN   (9-20)  mg/dL


 


Creatinine   (0.66-1.25)  mg/dL


 


Est GFR (CKD-EPI)AfAm   (>60 ml/min/1.73 sqM)  


 


Est GFR (CKD-EPI)NonAf   (>60 ml/min/1.73 sqM)  


 


Glucose   (74-99)  mg/dL


 


Plasma Lactic Acid Owen  1.4  (0.7-2.0)  mmol/L


 


Calcium   (8.4-10.2)  mg/dL


 


Total Bilirubin   (0.2-1.3)  mg/dL


 


AST   (17-59)  U/L


 


ALT   (4-49)  U/L


 


Alkaline Phosphatase   ()  U/L


 


Total Protein   (6.3-8.2)  g/dL


 


Albumin   (3.5-5.0)  g/dL


 


Lipase   ()  U/L


 


Urine Color   


 


Urine Appearance   (Clear)  


 


Urine pH   (5.0-8.0)  


 


Ur Specific Gravity   (1.001-1.035)  


 


Urine Protein   (Negative)  


 


Urine Glucose (UA)   (Negative)  


 


Urine Ketones   (Negative)  


 


Urine Blood   (Negative)  


 


Urine Nitrite   (Negative)  


 


Urine Bilirubin   (Negative)  


 


Urine Urobilinogen   (<2.0)  mg/dL


 


Ur Leukocyte Esterase   (Negative)  


 


Urine RBC   (0-5)  /hpf


 


Urine WBC   (0-5)  /hpf














- Radiology Data


Radiology results: report reviewed, image reviewed


CT abdomen and pelvis is obtained.  Report reviewed in its entirety.  Impression

by Dr. Burt shows mild to moderate left hydroureteronephrosis with 

surrounding inflammatory changes and without obstructing calculus.  Transition 

point is just proximal to the UVJ and stricturing cannot be excluded.  Recommend

urologic consultation.  Decompressed and indistinct urinary bladder with tiny 

focus of gas, most compatible with recent postsurgical changes.  No significant 

fluid collection.





Disposition


Clinical Impression: 


 Hydronephrosis, left, Ureteral obstruction, left





Disposition: HOME SELF-CARE


Condition: Good


Instructions (If sedation given, give patient instructions):  Flank Pain (ED), 

Hydronephrosis (ED)


Additional Instructions: 


Take medications as directed.  Follow-up with your urologist in the morning.  

Return to the emergency department for any new, worsening, or concerning 

symptoms.


Prescriptions: 


Acetaminophen-Codeine 300-30mg [Tylenol #3] 1 tab PO Q6H PRN #12 tablet


 PRN Reason: Pain


Is patient prescribed a controlled substance at d/c from ED?: No


Referrals: 


Maco Christiansen MD [Primary Care Provider] - 1-2 days


Colby Chris MD [STAFF PHYSICIAN] - 1-2 days


Time of Disposition: 22:20

## 2021-07-07 ENCOUNTER — HOSPITAL ENCOUNTER (OUTPATIENT)
Age: 81
Discharge: HOME | End: 2021-07-07
Payer: MEDICARE

## 2021-07-07 PROCEDURE — 76770 US EXAM ABDO BACK WALL COMP: CPT

## 2021-08-04 ENCOUNTER — HOSPITAL ENCOUNTER (OUTPATIENT)
Dept: HOSPITAL 47 - EC | Age: 81
Setting detail: OBSERVATION
LOS: 2 days | Discharge: HOME | End: 2021-08-06
Attending: INTERNAL MEDICINE | Admitting: INTERNAL MEDICINE
Payer: MEDICARE

## 2021-08-04 DIAGNOSIS — N40.0: ICD-10-CM

## 2021-08-04 DIAGNOSIS — Z79.82: ICD-10-CM

## 2021-08-04 DIAGNOSIS — F41.1: ICD-10-CM

## 2021-08-04 DIAGNOSIS — Z90.49: ICD-10-CM

## 2021-08-04 DIAGNOSIS — Z82.49: ICD-10-CM

## 2021-08-04 DIAGNOSIS — Z79.4: ICD-10-CM

## 2021-08-04 DIAGNOSIS — R07.89: Primary | ICD-10-CM

## 2021-08-04 DIAGNOSIS — Z79.899: ICD-10-CM

## 2021-08-04 DIAGNOSIS — Z85.51: ICD-10-CM

## 2021-08-04 DIAGNOSIS — E83.42: ICD-10-CM

## 2021-08-04 DIAGNOSIS — I10: ICD-10-CM

## 2021-08-04 DIAGNOSIS — Z83.1: ICD-10-CM

## 2021-08-04 DIAGNOSIS — Z87.891: ICD-10-CM

## 2021-08-04 DIAGNOSIS — E11.9: ICD-10-CM

## 2021-08-04 DIAGNOSIS — M1A.9XX0: ICD-10-CM

## 2021-08-04 DIAGNOSIS — E78.00: ICD-10-CM

## 2021-08-04 DIAGNOSIS — Z98.890: ICD-10-CM

## 2021-08-04 DIAGNOSIS — I25.10: ICD-10-CM

## 2021-08-04 DIAGNOSIS — Z96.652: ICD-10-CM

## 2021-08-04 DIAGNOSIS — E78.5: ICD-10-CM

## 2021-08-04 DIAGNOSIS — I08.3: ICD-10-CM

## 2021-08-04 DIAGNOSIS — Z82.3: ICD-10-CM

## 2021-08-04 LAB
ALBUMIN SERPL-MCNC: 4.5 G/DL (ref 3.5–5)
ALP SERPL-CCNC: 91 U/L (ref 38–126)
ALT SERPL-CCNC: 27 U/L (ref 4–49)
ANION GAP SERPL CALC-SCNC: 12 MMOL/L
APTT BLD: 23.5 SEC (ref 22–30)
AST SERPL-CCNC: 44 U/L (ref 17–59)
BASOPHILS # BLD AUTO: 0 K/UL (ref 0–0.2)
BASOPHILS NFR BLD AUTO: 1 %
BUN SERPL-SCNC: 19 MG/DL (ref 9–20)
CALCIUM SPEC-MCNC: 10.4 MG/DL (ref 8.4–10.2)
CHLORIDE SERPL-SCNC: 107 MMOL/L (ref 98–107)
CO2 SERPL-SCNC: 23 MMOL/L (ref 22–30)
EOSINOPHIL # BLD AUTO: 0.2 K/UL (ref 0–0.7)
EOSINOPHIL NFR BLD AUTO: 2 %
ERYTHROCYTE [DISTWIDTH] IN BLOOD BY AUTOMATED COUNT: 4.87 M/UL (ref 4.3–5.9)
ERYTHROCYTE [DISTWIDTH] IN BLOOD: 15.4 % (ref 11.5–15.5)
GLUCOSE BLD-MCNC: 302 MG/DL (ref 75–99)
GLUCOSE SERPL-MCNC: 159 MG/DL (ref 74–99)
HCT VFR BLD AUTO: 44 % (ref 39–53)
HGB BLD-MCNC: 14.7 GM/DL (ref 13–17.5)
INR PPP: 1 (ref ?–1.2)
LYMPHOCYTES # SPEC AUTO: 1.5 K/UL (ref 1–4.8)
LYMPHOCYTES NFR SPEC AUTO: 18 %
MAGNESIUM SPEC-SCNC: 1.4 MG/DL (ref 1.6–2.3)
MCH RBC QN AUTO: 30.2 PG (ref 25–35)
MCHC RBC AUTO-ENTMCNC: 33.4 G/DL (ref 31–37)
MCV RBC AUTO: 90.5 FL (ref 80–100)
MONOCYTES # BLD AUTO: 0.5 K/UL (ref 0–1)
MONOCYTES NFR BLD AUTO: 6 %
NEUTROPHILS # BLD AUTO: 6.2 K/UL (ref 1.3–7.7)
NEUTROPHILS NFR BLD AUTO: 73 %
PLATELET # BLD AUTO: 117 K/UL (ref 150–450)
POTASSIUM SERPL-SCNC: 4.2 MMOL/L (ref 3.5–5.1)
PROT SERPL-MCNC: 7.2 G/DL (ref 6.3–8.2)
PT BLD: 10.5 SEC (ref 9–12)
SODIUM SERPL-SCNC: 142 MMOL/L (ref 137–145)
WBC # BLD AUTO: 8.5 K/UL (ref 3.8–10.6)

## 2021-08-04 PROCEDURE — 93306 TTE W/DOPPLER COMPLETE: CPT

## 2021-08-04 PROCEDURE — 85379 FIBRIN DEGRADATION QUANT: CPT

## 2021-08-04 PROCEDURE — 85610 PROTHROMBIN TIME: CPT

## 2021-08-04 PROCEDURE — 96366 THER/PROPH/DIAG IV INF ADDON: CPT

## 2021-08-04 PROCEDURE — 96368 THER/DIAG CONCURRENT INF: CPT

## 2021-08-04 PROCEDURE — 84484 ASSAY OF TROPONIN QUANT: CPT

## 2021-08-04 PROCEDURE — 96376 TX/PRO/DX INJ SAME DRUG ADON: CPT

## 2021-08-04 PROCEDURE — 36415 COLL VENOUS BLD VENIPUNCTURE: CPT

## 2021-08-04 PROCEDURE — 93005 ELECTROCARDIOGRAM TRACING: CPT

## 2021-08-04 PROCEDURE — 83735 ASSAY OF MAGNESIUM: CPT

## 2021-08-04 PROCEDURE — 85025 COMPLETE CBC W/AUTO DIFF WBC: CPT

## 2021-08-04 PROCEDURE — 71046 X-RAY EXAM CHEST 2 VIEWS: CPT

## 2021-08-04 PROCEDURE — 80053 COMPREHEN METABOLIC PANEL: CPT

## 2021-08-04 PROCEDURE — 96365 THER/PROPH/DIAG IV INF INIT: CPT

## 2021-08-04 PROCEDURE — 80061 LIPID PANEL: CPT

## 2021-08-04 PROCEDURE — 99291 CRITICAL CARE FIRST HOUR: CPT

## 2021-08-04 PROCEDURE — 85730 THROMBOPLASTIN TIME PARTIAL: CPT

## 2021-08-04 RX ADMIN — NITROGLYCERIN SCH INCH: 20 OINTMENT TOPICAL at 17:27

## 2021-08-04 NOTE — XR
EXAMINATION TYPE: XR chest 2V

 

DATE OF EXAM: 8/4/2021

 

COMPARISON: 10/6/2018

 

INDICATION: Chest pain

 

TECHNIQUE:  Frontal and lateral views of the chest are obtained.

 

FINDINGS:  

The heart size is normal.  

The pulmonary vasculature is normal.

The lungs are clear.

 

IMPRESSION:  

1. No acute pulmonary process.

## 2021-08-04 NOTE — ED
General Adult HPI





- General


Chief complaint: Chest Pain


Stated complaint: Chest pain


Time Seen by Provider: 08/04/21 13:05


Source: patient, RN notes reviewed, old records reviewed


Mode of arrival: wheelchair


Limitations: no limitations





- History of Present Illness


Initial comments: 





This is an 81-year-old male who states that about 3:30 this morning he woke up 

with some chest pain.  Patient states she was mildly short of breath and the 

pain radiated to his jaw when it initially started.  Patient states currently 

the pain is almost gone but is still there slightly but there is no Pain.  

Patient denies any diaphoretic episodes or nausea.  Patient states he does have 

a history of a blocked coronary vessel that was unable to be stent.  Patient 

states she's also diabetic with high blood pressure high cholesterol.  Patient 

denies any smoking history.  Patient denies any recent fever chills or cough per

patient denies any abdominal pain.  Patient denies any vomiting or diarrhea.  

Patient denies lightheadedness or dizziness.  Patient denies any swelling to her

legs or calf tenderness.





- Related Data


                                Home Medications











 Medication  Instructions  Recorded  Confirmed


 


Insulin Glargine,Hum.rec.anlog 65 unit SQ HS 12/22/14 08/04/21





[Lantus Solostar]   


 


Rosuvastatin Calcium [Crestor] 10 mg PO DAILY 12/22/14 08/04/21


 


allopurinoL [Zyloprim] 100 mg PO DAILY 12/22/14 08/04/21


 


sitaGLIPtin PHOS/metFORMIN HCL 1 tab PO DAILY 12/22/14 08/04/21





[Janumet 50-1,000 mg Tablet]   


 


Aspirin EC [Ecotrin Low Dose] 81 mg PO DAILY 06/01/16 08/04/21


 


Canagliflozin [Invokana] 100 mg PO DAILY 06/01/16 08/04/21


 


Insulin Lispro [humaLOG Kwikpen] 10 unit SQ AC-TID 08/04/21 08/04/21








                                  Previous Rx's











 Medication  Instructions  Recorded


 


Metoprolol Tartrate [Lopressor] 25 mg PO BID #60 tab 10/07/18


 


Nitroglycerin Sl Tabs [Nitrostat] 0.4 mg SUBLINGUAL Q5M PRN #30 tab 10/07/18


 


lisinopriL [Zestril] 2.5 mg PO DAILY  tab 10/07/18











                                    Allergies











Allergy/AdvReac Type Severity Reaction Status Date / Time


 


No Known Allergies Allergy   Verified 08/04/21 15:43














Review of Systems


ROS Statement: 


Those systems with pertinent positive or pertinent negative responses have been 

documented in the HPI.





ROS Other: All systems not noted in ROS Statement are negative.





Past Medical History


Past Medical History: Coronary Artery Disease (CAD), Cancer, Diabetes Mellitus, 

Hyperlipidemia, Hypertension


Additional Past Medical History / Comment(s): BPH bladder cancer


History of Any Multi-Drug Resistant Organisms: None Reported


Past Surgical History: Appendectomy, Bladder Surgery, Joint Replacement, 

Tonsillectomy


Additional Past Surgical History / Comment(s): left knee replaced, right rotator

cuff SX, COLONOSCOPY,


Past Anesthesia/Blood Transfusion Reactions: No Reported Reaction


Past Psychological History: No Psychological Hx Reported


Smoking Status: Never smoker


Past Alcohol Use History: Occasional


Past Drug Use History: None Reported





- Past Family History


  ** Mother


Family Medical History: No Reported History


Additional Family Medical History / Comment(s): Mother had history of congenital

heart disease, CHF.





  ** Father


Family Medical History: No Reported History, CVA/TIA


Additional Family Medical History / Comment(s): Father contracted malaria.  

History of CVA.





  ** Brother(s)


Family Medical History: No Reported History


Additional Family Medical History / Comment(s): Brother has history of 

headaches.





  ** Sister(s)


Family Medical History: No Reported History





  ** Daughter(s)


Family Medical History: No Reported History


Additional Family Medical History / Comment(s): Patient has one daughter with no

major medical problems.





  ** Son(s)


Family Medical History: No Reported History


Additional Family Medical History / Comment(s): Patient has one son with no 

major medical problems.





General Exam





- General Exam Comments


Initial Comments: 





GENERAL:


Patient is well-developed and well-nourished.  Patient is nontoxic and well-

hydrated and is in mild distress.





ENT:


Neck is soft and supple.  No significant lymphadenopathy is noted.  Oropharynx 

is clear.  Moist mucous membranes.  Neck has full range of motion without 

eliciting any pain. 





EYES:


The sclera were anicteric and conjunctiva were pink and moist.  Extraocular 

movements were intact and pupils were equal round and reactive to light.  

Eyelids were unremarkable.





PULMONARY:


Unlabored respirations.  Good breath sounds bilaterally.  No audible rales 

rhonchi or wheezing was noted.





CARDIOVASCULAR:


There is a regular rate and rhythm without any murmurs gallops or rubs. 





ABDOMEN:


Soft and nontender with normal bowel sounds. 





SKIN:


Skin is clear with no lesions or rashes and otherwise unremarkable.





NEUROLOGIC:


Patient is alert and oriented x3.  Cranial nerves II through XII are grossly 

intact.  Motor and sensory are also intact.  Normal speech, volume and content. 

Symmetrical smile.  





MUSCULOSKELETAL:


Normal extremities with adequate strength and full range of motion.  No lower 

extremity swelling or edema.  No calf tenderness.





LYMPHATICS:


No significant lymphadenopathy is noted





PSYCHIATRIC:


Normal psychiatric evaluation.  


Limitations: no limitations





Course


                                   Vital Signs











  08/04/21 08/04/21 08/04/21





  13:03 14:25 15:20


 


Temperature 97.7 F  


 


Pulse Rate 90 85 92


 


Respiratory 18 20 20





Rate   


 


Blood Pressure 147/68 138/81 117/86


 


O2 Sat by Pulse 98 98 97





Oximetry   














Medical Decision Making





- Medical Decision Making





EKG shows sinus rhythm with occasional PAC at 85 bpm DC interval is 198 QRS is 

90 QT interval 360 QTC is 437.  No ST segment elevation or depression.





Chest x-ray shows no acute abnormality.





I started patient on heparin for the unstable angina.





I spoke with Dr. Christiansen and he agreed to admit the patient and the patient 

remaining orders and consult cardiology continued heparin has been Nitropaste 

before.





- Lab Data


Result diagrams: 


                                 08/04/21 14:51





                                 08/04/21 14:51


                                   Lab Results











  08/04/21 08/04/21 08/04/21 Range/Units





  14:51 14:51 14:51 


 


WBC  8.5    (3.8-10.6)  k/uL


 


RBC  4.87    (4.30-5.90)  m/uL


 


Hgb  14.7    (13.0-17.5)  gm/dL


 


Hct  44.0    (39.0-53.0)  %


 


MCV  90.5    (80.0-100.0)  fL


 


MCH  30.2    (25.0-35.0)  pg


 


MCHC  33.4    (31.0-37.0)  g/dL


 


RDW  15.4    (11.5-15.5)  %


 


Plt Count  117 L    (150-450)  k/uL


 


MPV  8.1    


 


Neutrophils %  73    %


 


Lymphocytes %  18    %


 


Monocytes %  6    %


 


Eosinophils %  2    %


 


Basophils %  1    %


 


Neutrophils #  6.2    (1.3-7.7)  k/uL


 


Lymphocytes #  1.5    (1.0-4.8)  k/uL


 


Monocytes #  0.5    (0-1.0)  k/uL


 


Eosinophils #  0.2    (0-0.7)  k/uL


 


Basophils #  0.0    (0-0.2)  k/uL


 


PT   10.5   (9.0-12.0)  sec


 


INR   1.0   (<1.2)  


 


APTT   23.5   (22.0-30.0)  sec


 


Sodium    142  (137-145)  mmol/L


 


Potassium    4.2  (3.5-5.1)  mmol/L


 


Chloride    107  ()  mmol/L


 


Carbon Dioxide    23  (22-30)  mmol/L


 


Anion Gap    12  mmol/L


 


BUN    19  (9-20)  mg/dL


 


Creatinine    0.71  (0.66-1.25)  mg/dL


 


Est GFR (CKD-EPI)AfAm    >90  (>60 ml/min/1.73 sqM)  


 


Est GFR (CKD-EPI)NonAf    87  (>60 ml/min/1.73 sqM)  


 


Glucose    159 H  (74-99)  mg/dL


 


Calcium    10.4 H  (8.4-10.2)  mg/dL


 


Magnesium    1.4 L  (1.6-2.3)  mg/dL


 


Total Bilirubin    1.1  (0.2-1.3)  mg/dL


 


AST    44  (17-59)  U/L


 


ALT    27  (4-49)  U/L


 


Alkaline Phosphatase    91  ()  U/L


 


Troponin I     (0.000-0.034)  ng/mL


 


Total Protein    7.2  (6.3-8.2)  g/dL


 


Albumin    4.5  (3.5-5.0)  g/dL














  08/04/21 Range/Units





  14:51 


 


WBC   (3.8-10.6)  k/uL


 


RBC   (4.30-5.90)  m/uL


 


Hgb   (13.0-17.5)  gm/dL


 


Hct   (39.0-53.0)  %


 


MCV   (80.0-100.0)  fL


 


MCH   (25.0-35.0)  pg


 


MCHC   (31.0-37.0)  g/dL


 


RDW   (11.5-15.5)  %


 


Plt Count   (150-450)  k/uL


 


MPV   


 


Neutrophils %   %


 


Lymphocytes %   %


 


Monocytes %   %


 


Eosinophils %   %


 


Basophils %   %


 


Neutrophils #   (1.3-7.7)  k/uL


 


Lymphocytes #   (1.0-4.8)  k/uL


 


Monocytes #   (0-1.0)  k/uL


 


Eosinophils #   (0-0.7)  k/uL


 


Basophils #   (0-0.2)  k/uL


 


PT   (9.0-12.0)  sec


 


INR   (<1.2)  


 


APTT   (22.0-30.0)  sec


 


Sodium   (137-145)  mmol/L


 


Potassium   (3.5-5.1)  mmol/L


 


Chloride   ()  mmol/L


 


Carbon Dioxide   (22-30)  mmol/L


 


Anion Gap   mmol/L


 


BUN   (9-20)  mg/dL


 


Creatinine   (0.66-1.25)  mg/dL


 


Est GFR (CKD-EPI)AfAm   (>60 ml/min/1.73 sqM)  


 


Est GFR (CKD-EPI)NonAf   (>60 ml/min/1.73 sqM)  


 


Glucose   (74-99)  mg/dL


 


Calcium   (8.4-10.2)  mg/dL


 


Magnesium   (1.6-2.3)  mg/dL


 


Total Bilirubin   (0.2-1.3)  mg/dL


 


AST   (17-59)  U/L


 


ALT   (4-49)  U/L


 


Alkaline Phosphatase   ()  U/L


 


Troponin I  <0.012  (0.000-0.034)  ng/mL


 


Total Protein   (6.3-8.2)  g/dL


 


Albumin   (3.5-5.0)  g/dL














Critical Care Time


Critical Care Time: Yes


Total Critical Care Time: 35





Disposition


Clinical Impression: 


 Unstable angina pectoris





Disposition: ADMITTED AS IP TO THIS HOSP


Referrals: 


Maco Christiansen MD [Primary Care Provider] - 1-2 days


Time of Disposition: 16:01

## 2021-08-05 LAB
CHOLEST SERPL-MCNC: 171 MG/DL (ref 0–200)
GLUCOSE BLD-MCNC: 102 MG/DL (ref 75–99)
GLUCOSE BLD-MCNC: 135 MG/DL (ref 75–99)
GLUCOSE BLD-MCNC: 173 MG/DL (ref 75–99)
GLUCOSE BLD-MCNC: 175 MG/DL (ref 75–99)
HDLC SERPL-MCNC: 40 MG/DL (ref 40–60)
LDLC SERPL CALC-MCNC: 96 MG/DL (ref 0–131)
TRIGL SERPL-MCNC: 175 MG/DL (ref 0–149)
VLDLC SERPL CALC-MCNC: 35 MG/DL (ref 5–40)

## 2021-08-05 RX ADMIN — ASPIRIN 81 MG CHEWABLE TABLET SCH MG: 81 TABLET CHEWABLE at 08:39

## 2021-08-05 RX ADMIN — INSULIN DETEMIR SCH UNIT: 100 INJECTION, SOLUTION SUBCUTANEOUS at 02:39

## 2021-08-05 RX ADMIN — NITROGLYCERIN SCH: 20 OINTMENT TOPICAL at 06:24

## 2021-08-05 RX ADMIN — INSULIN ASPART SCH: 100 INJECTION, SOLUTION INTRAVENOUS; SUBCUTANEOUS at 01:42

## 2021-08-05 RX ADMIN — ASPIRIN SCH: 325 TABLET ORAL at 08:39

## 2021-08-05 RX ADMIN — LINAGLIPTIN SCH MG: 5 TABLET, FILM COATED ORAL at 08:39

## 2021-08-05 RX ADMIN — ASPIRIN SCH: 325 TABLET ORAL at 09:18

## 2021-08-05 RX ADMIN — ASPIRIN 81 MG CHEWABLE TABLET SCH: 81 TABLET CHEWABLE at 09:18

## 2021-08-05 RX ADMIN — INSULIN ASPART SCH UNIT: 100 INJECTION, SOLUTION INTRAVENOUS; SUBCUTANEOUS at 12:27

## 2021-08-05 RX ADMIN — LINAGLIPTIN SCH: 5 TABLET, FILM COATED ORAL at 09:19

## 2021-08-05 RX ADMIN — ATORVASTATIN CALCIUM SCH MG: 40 TABLET, FILM COATED ORAL at 08:39

## 2021-08-05 RX ADMIN — METOPROLOL TARTRATE SCH: 25 TABLET, FILM COATED ORAL at 22:14

## 2021-08-05 RX ADMIN — INSULIN ASPART SCH UNIT: 100 INJECTION, SOLUTION INTRAVENOUS; SUBCUTANEOUS at 22:36

## 2021-08-05 RX ADMIN — INSULIN DETEMIR SCH UNIT: 100 INJECTION, SOLUTION SUBCUTANEOUS at 22:36

## 2021-08-05 RX ADMIN — INSULIN ASPART SCH UNIT: 100 INJECTION, SOLUTION INTRAVENOUS; SUBCUTANEOUS at 08:40

## 2021-08-05 RX ADMIN — INSULIN ASPART SCH: 100 INJECTION, SOLUTION INTRAVENOUS; SUBCUTANEOUS at 17:44

## 2021-08-05 RX ADMIN — NITROGLYCERIN SCH: 20 OINTMENT TOPICAL at 02:40

## 2021-08-05 RX ADMIN — INSULIN ASPART SCH UNIT: 100 INJECTION, SOLUTION INTRAVENOUS; SUBCUTANEOUS at 18:02

## 2021-08-05 RX ADMIN — METOPROLOL TARTRATE SCH MG: 25 TABLET, FILM COATED ORAL at 08:39

## 2021-08-05 RX ADMIN — METOPROLOL TARTRATE SCH: 25 TABLET, FILM COATED ORAL at 09:19

## 2021-08-05 RX ADMIN — INSULIN ASPART SCH: 100 INJECTION, SOLUTION INTRAVENOUS; SUBCUTANEOUS at 02:40

## 2021-08-05 RX ADMIN — INSULIN ASPART SCH UNIT: 100 INJECTION, SOLUTION INTRAVENOUS; SUBCUTANEOUS at 12:26

## 2021-08-05 RX ADMIN — ISOSORBIDE MONONITRATE SCH MG: 30 TABLET, EXTENDED RELEASE ORAL at 08:38

## 2021-08-05 NOTE — P.HPIM
History of Present Illness


H&P Date: 21





HISTORY OF PRESENT ILLNESS


This is an 81-year-old male patient of Dr. Christiansen with past medical history of 

coronary artery disease, hypertension, hyperlipidemia, diabetes mellitus type 2,

generalized anxiety disorder, bladder cancer under the care of Dr. Chris, 

chronic gout.  Patient states that he was seen by Dr. Chris yesterday in his 

offic.e Patient is undergoing BCG injections and he is to return in 1 month.  

While he was at the office with Dr. Chris, patient told him that he had been 

having midsternal chest pain that was coming and going with radiation to his nec

k and also into his jaw.  Dr. Chris instructed him to go to the hospital for 

further evaluation.  Patient states that he has noticed chest pain while he is 

pulling weeds and with other activities but also has chest pain with deep 

breathing.  In 2018, patient underwent cardiac catheterization that revealed 

left main no high-grade stenosis, LAD with an area of 30-40% intimal disease in 

the proximal and midportion, circumflex with a tortuous segment with 99% 

stenosis in the proximal portion and RCA with no evidence of high-grade 

stenosis.  At that time PCI was attempted however was unsuccessful due to heavy 

calcifications.  CT surgery evaluated the patient and due to single-vessel 

disease recommended maximum medical therapy.  He last saw Dr. Maier a couple 

months ago..





Patient presented to [facility] emergency center.  EKG was a 

sinus rhythm with no acute ST changes.  Chest x-ray showed no acute 

cardiopulmonary process. WBC 8.5, hemoglobin 14.7, platelets 117.  Electrolytes 

and renal function were normal.  Blood sugar initially 302.  Magnesium 1.4.  

Calcium 10.4.  Liver function tests were normal.  Troponin negative on 3 draws. 

Albumin 4.5.  Triglycerides 175, cholesterol 171, LDL 96, HDL 40.  Patient was 

placed on the observation unit, started on heparin drip and cardiology consult 

requested.





REVIEW OF SYSTEMS


Constitutional: No fever, no chills, no night sweats.  No weight change.  No 

weakness, fatigue or lethargy.  No daytime sleepiness.


EENT: No headache.  No blurred vision or double vision, no loss of vision.  No 

loss of Hearing, no ringing in the ears, no dizziness.  No nasal drainage or 

congestion.  No epistaxis.  No sore throat.


Lungs: No shortness of breath, cough, no sputum production.  No wheezing.


Cardiovascular: Reports chest pain, no lower extremity edema.  No palpitations. 

No paroxysmal nocturnal dyspnea.  No orthopnea.  No lightheadedness or 

dizziness.  No syncopal episodes.  Reports chest pain with deep inspiration


Abdominal: No abdominal pain.  No nausea, vomiting.  No diarrhea.  No 

constipation.  No bloody or tarry stools..  No loss of appetite.


Genitourinary: No dysuria, increased frequency, urgency.  No urinary retention.


Musculoskeletal: No myalgias.  No muscle weakness, no gait dysfunction, no 

frequent falls.  No back pain.  No neck pain.


Integumentary: No wounds, no lesions.  No rash or pruritus.  No unusual 

bruising.  No change in hair or nails.


Neurologic: No aphasia. No facial droop. No change in mentation. No head injury.

No headache. No paralysis. No paresthesia.


Psychiatric: No depression.  No anxiety.  No mood swings.


Endocrine: No abnormal blood sugars.  No weight change.   





MEDICAL HISTORY


Coronary artery disease


Hypertension


Hyperlipidemia


Diabetes mellitus type 2


Generalized anxiety disorder


Bladder cancer


Chronic gout





SURGICAL HISTORY


Left total knee arthroplasty


Right shoulder rotator cuff repair


Bladder surgery


Appendectomy


Tonsillectomy


Colonoscopy





SOCIAL HISTORY


Patient has remote history of tobacco use, no alcohol use, marijuana or illicit 

drug use.





FAMILY HISTORY


Father  from a stroke with history of malaria at 27 years of age.  Mother 

 at age 83 from heart failure.  Patient has one half brother that  at 

age 56 from Covid and MI.  Patient has one daughter with no major medical 

problems and 2 sons with no major medical problems.





PHYSICAL EXAMINATION


Gen: This is an 81-year-old male patient.  He is resting in bed appears to be 

comfortable and in no acute distress.


HEENT: Head is atraumatic, normocephalic. Pupils equal, round. Sclerae is 

anicteric. 


NECK: Supple. No JVD. No lymphadenopathy. No thyromegaly. 


LUNGS: Clear to auscultation. No wheezes or rhonchi.  No intercostal 

retractions.


HEART: First heart sound is depressed, second heart sound is normal, 2/6 

systolic ejection murmur at the left sternal border.  No S3, no S4. 


ABDOMEN: Soft. Bowel sounds are present. No masses.  No tenderness.


EXTREMITIES: No pedal edema.  No calf tenderness.


NEUROLOGICAL: Patient is awake, alert and oriented x3. Cranial nerves 2 through 

12 are grossly intact. 





ASSESSMENT AND PLAN


1.  Chest pain with negative troponins.  Consult with cardiology, 

echocardiogram, heparin drip has been discontinued by cardiology.  Continue 

Imdur 30 mg daily, Lopressor 25 mg twice daily.





2.  Hypertension.  Continue lisinopril 2.5 mg daily, Lopressor 25 mg twice 

daily.





3.  Hyperlipidemia.  Continue Lipitor 40 mg daily.





4.  Diabetes mellitus type 2.  Continue Levemir 65 units at bedtime, NovoLog 

scale 10 units with meals, interval,, patient may bring his dose from home 100 

mg daily, Tradjenta 5 mg daily, metformin 1000 mg daily, NovoLog scale before 

meals and at bedtime.





5.  Generalized anxiety disorder, stable.





6.  History of bladder cancer under the care Dr Chris.





7.  Chronic gout.  Continue allopurinol 100 mg daily





8.  GI prophylaxis.  Protonix.





9.  DVT prophylaxis.  Early ambulation, TERA hose and SCDs








Patient placed as an observation status.





DISCHARGE PLAN


Home.





Impression and plan of care have been directed as dictated by the signing 

physician.  Millie Crump nurse practitioner acting as scribe for signing 

physician.





Past Medical History


Past Medical History: Coronary Artery Disease (CAD), Cancer, Diabetes Mellitus, 

Hyperlipidemia, Hypertension


Additional Past Medical History / Comment(s): BPH bladder cancer


History of Any Multi-Drug Resistant Organisms: None Reported


Past Surgical History: Appendectomy, Bladder Surgery, Joint Replacement, 

Tonsillectomy


Additional Past Surgical History / Comment(s): left knee replaced, right rotator

cuff SX, COLONOSCOPY,


Past Anesthesia/Blood Transfusion Reactions: No Reported Reaction


Past Psychological History: No Psychological Hx Reported


Smoking Status: Never smoker


Past Alcohol Use History: Occasional


Additional Past Alcohol Use History / Comment(s): Patient was a smoker of one 

pack per day and QUIT SMOKING 35 YRS AGO.  He drinks approximately 1 

beer/3months.


Past Drug Use History: None Reported





- Past Family History


  ** Mother


Family Medical History: No Reported History


Additional Family Medical History / Comment(s): Mother had history of congenital

heart disease, CHF.





  ** Father


Family Medical History: No Reported History, CVA/TIA


Additional Family Medical History / Comment(s): Father contracted malaria.  

History of CVA.





  ** Brother(s)


Family Medical History: No Reported History


Additional Family Medical History / Comment(s): Brother has history of 

headaches.





  ** Sister(s)


Family Medical History: No Reported History





  ** Daughter(s)


Family Medical History: No Reported History


Additional Family Medical History / Comment(s): Patient has one daughter with no

major medical problems.





  ** Son(s)


Family Medical History: No Reported History


Additional Family Medical History / Comment(s): Patient has one son with no 

major medical problems.





Medications and Allergies


                                Home Medications











 Medication  Instructions  Recorded  Confirmed  Type


 


Insulin Glargine,Hum.rec.anlog 65 unit SQ HS 14 History





[Lantus Solostar]    


 


Rosuvastatin Calcium [Crestor] 10 mg PO DAILY 14 History


 


allopurinoL [Zyloprim] 100 mg PO DAILY 14 History


 


sitaGLIPtin PHOS/metFORMIN HCL 1 tab PO DAILY 14 History





[Janumet 50-1,000 mg Tablet]    


 


Aspirin EC [Ecotrin Low Dose] 81 mg PO DAILY 16 History


 


Canagliflozin [Invokana] 100 mg PO DAILY 16 History


 


Metoprolol Tartrate [Lopressor] 25 mg PO BID #60 tab 10/07/18 08/04/21 Rx


 


Nitroglycerin Sl Tabs [Nitrostat] 0.4 mg SUBLINGUAL Q5M PRN #30 tab 10/07/18 

08/04/21 Rx


 


lisinopriL [Zestril] 2.5 mg PO DAILY  tab 10/07/18 08/04/21 Rx


 


Insulin Lispro [humaLOG Kwikpen] 10 unit SQ AC-TID 21 History








                                    Allergies











Allergy/AdvReac Type Severity Reaction Status Date / Time


 


No Known Allergies Allergy   Verified 21 15:43














Physical Exam


Vitals: 


                                   Vital Signs











  Temp Pulse Pulse Resp BP BP Pulse Ox


 


 21 00:51  98.5 F   86  20   103/63  95


 


 21 20:13  99.2 F  101 H   18  115/69   96


 


 21 20:00  98.7 F   109 H  18   129/70  95


 


 21 19:39   102 H   18  136/70   97


 


 21 18:58   98   16  122/70   100


 


 21 18:20   96   20  148/78   92 L


 


 21 17:25   96   16  133/68   97


 


 21 16:11   94   16  131/63   97


 


 21 15:20   92   20  117/86   97


 


 21 14:25   85   20  138/81   98


 


 21 13:03  97.7 F  90   18  147/68   98








                                Intake and Output











 21





 22:59 06:59 14:59


 


Intake Total  110.522 


 


Balance  110.522 


 


Intake:   


 


  Intake, IV Titration  110.522 





  Amount   


 


    Heparin Sod,Pork in 0.45%  110.522 





    NaCl 25,000 unit In 0.45   





    % NaCl 1 250ml.bag @ 9.8   





    UNITS/KG/HR 10.002 mls/hr   





    IV .Q24H [facility] Rx#:   





    [id number]   


 


Other:   


 


  # Voids 1 2 


 


  Weight 102.058 kg  














Results


CBC & Chem 7: 


                                 21 14:51





                                 21 14:51


Labs: 


                  Abnormal Lab Results - Last 24 Hours (Table)











  21 Range/Units





  14:51 14:51 23:00 


 


Plt Count  117 L    (150-450)  k/uL


 


Glucose   159 H   (74-99)  mg/dL


 


POC Glucose (mg/dL)    302 H  (75-99)  mg/dL


 


Calcium   10.4 H   (8.4-10.2)  mg/dL


 


Magnesium   1.4 L   (1.6-2.3)  mg/dL

## 2021-08-05 NOTE — P.CRDCN
History of Present Illness


History of present illness: 





HISTORY OF PRESENTING ILLNESS


This is a pleasant 81-year-old  male past medical history significant 

for coronary artery disease, hypertension, dyslipidemia and recent diagnosis of 

bladder cancer status post surgical resection.  He follows in the office with 

Dr. Maier. We have been asked to see in consultation for chest pain.  He states

yesterday intermittently throughout the day he was having episodes of chest 

discomfort.  The discomfort was in the midsternal region and would radiate up 

into his jaw bilaterally and at times it was associated with difficulty in 

taking a deep breath.  The pain was not exacerbated by deep breathing, movement 

or activity.  EKG on arrival revealed sinus mechanism with no acute ST or T wave

abnormalities noted.  The patient had another episode of chest discomfort while 

at the hospital which prompted a repeat EKG.  Although the EKG electronic 

reading was saying acute MI there is no evidence of ST elevation when reading 

EKG tracings.  The patient was seen and examined resting comfortably in bed in 

no acute distress.  He has had no symptoms of chest discomfort this morning and 

states he slept well.  In 2018 he underwent cardiac catheterization secondary to

an abnormal stress test revealing left main no high-grade stenosis, LAD with an 

area of 30-40% intimal disease in the proximal and midportion, circumflex with a

tortuous segment with 99% stenosis in the proximal portion and RCA with no 

evidence of high-grade stenosis.  At that time PCI was attempted however was 

unsuccessful due to heavy calcifications.  CT surgery evaluated the patient and 

due to single-vessel disease recommended maximum medical therapy.  Chest x-ray 

on this admission is negative for an acute cardiopulmonary process.  Laboratory 

data reviewed, WBC 8.5, hemoglobin 14.7, platelets 117, d-dimer 0.37, sodium 

142, potassium 4.2, creatinine 0.71, magnesium 1.4 and cardiac enzymes negative 

3.  Most recent echocardiogram obtained in the office September 2020 reveals 

preserved LV systolic function with ejection fraction 55%, mildly dilated left 

atrium, calcified aortic valve, mild mitral regurgitation and mild tricuspid 

regurgitation.





REVIEW OF SYSTEMS


At the time of my exam:


CONSTITUTIONAL: Denies fever or chills.


CARDIOVASCULAR: Denies chest pain, shortness of breath, orthopnea, PND or 

palpitations.


RESPIRATORY: Denies cough. 


GASTROINTESTINAL: Denies abdominal pain, diarrhea, constipation, nausea or 

vomiting.


MUSCULOSKELETAL: Denies myalgias.


NEUROLOGIC: Denies numbness, tingling, headacbe or weakness.


ENDOCRINE: Denies fatigue, weight change,  polydipsia or polyurina.


GENITOURINARY: Denies burning, hematuria or urgency with micturation.


HEMATOLOGIC: Denies history of anemia or bleeding. 





PHYSICAL EXAMINATION


Blood pressure 122/73 heart rate 83 afebrile and maintaining oxygen saturation 

on nasal cannula.


CONSTITUTIONAL: No apparent distress. 


HEENT: Head is normocephalic. Pupils are equal, round. Sclerae anicteric. Mucous

membranes of the mouth are moist.  No JVD. No carotid bruit.


CHEST EXAMINATION: Lungs are clear to auscultation. No chest wall tenderness is 

noted on palpation or with deep breathing. 


HEART EXAMINATION: Regular rate and rhythm. S1, S2 heard. No murmurs, gallops or

rub.


ABDOMEN: Soft, nontender. Positive bowel sounds.


EXTREMITIES: 2+ peripheral pulses, no lower extremity edema and no calf 

tenderness.


NEUROLOGIC EXAMINATION: Patient is awake, alert and oriented x3. 





ASSESSMENT


Chest pain


Hypomagnesemia


Coronary artery disease not amendable to PCI


Hypertension


Dyslipidemia


Recent diagnosis of bladder cancer status post surgical resection





PLAN


An acute coronary event has been ruled out.


Discontinue heparin infusion.


Decrease aspirin to 81 mg daily.


Add Imdur 30 mg daily to his regimen.


Replace magnesium per protocol.


Increase activity and ambulation and assess for exertional chest discomfort on 

nitrates.


Obtain 2-D echocardiogram and Doppler study to assess cardiac structure and 

function.


Further recommendations to follow based upon clinical course.


Thank you kindly for this consultation.





Nurse Practitioner note has been reviewed, I agree with a documented findings 

and plan of care.  Patient was seen and examined.











Past Medical History


Past Medical History: Coronary Artery Disease (CAD), Cancer, Diabetes Mellitus, 

Hyperlipidemia, Hypertension


Additional Past Medical History / Comment(s): BPH bladder cancer


History of Any Multi-Drug Resistant Organisms: None Reported


Past Surgical History: Appendectomy, Bladder Surgery, Joint Replacement, 

Tonsillectomy


Additional Past Surgical History / Comment(s): left knee replaced, right rotator

cuff SX, COLONOSCOPY,


Past Anesthesia/Blood Transfusion Reactions: No Reported Reaction


Past Psychological History: No Psychological Hx Reported


Smoking Status: Never smoker


Past Alcohol Use History: Occasional


Additional Past Alcohol Use History / Comment(s): Patient was a smoker of one 

pack per day and QUIT SMOKING 35 YRS AGO.  He drinks approximately 1 

beer/3months.


Past Drug Use History: None Reported





- Past Family History


  ** Mother


Family Medical History: No Reported History


Additional Family Medical History / Comment(s): Mother had history of congenital

heart disease, CHF.





  ** Father


Family Medical History: No Reported History, CVA/TIA


Additional Family Medical History / Comment(s): Father contracted malaria.  

History of CVA.





  ** Brother(s)


Family Medical History: No Reported History


Additional Family Medical History / Comment(s): Brother has history of 

headaches.





  ** Sister(s)


Family Medical History: No Reported History





  ** Daughter(s)


Family Medical History: No Reported History


Additional Family Medical History / Comment(s): Patient has one daughter with no

major medical problems.





  ** Son(s)


Family Medical History: No Reported History


Additional Family Medical History / Comment(s): Patient has one son with no 

major medical problems.





Medications and Allergies


                                Home Medications











 Medication  Instructions  Recorded  Confirmed  Type


 


Insulin Glargine,Hum.rec.anlog 65 unit SQ HS 12/22/14 08/04/21 History





[Lantus Solostar]    


 


Rosuvastatin Calcium [Crestor] 10 mg PO DAILY 12/22/14 08/04/21 History


 


allopurinoL [Zyloprim] 100 mg PO DAILY 12/22/14 08/04/21 History


 


sitaGLIPtin PHOS/metFORMIN HCL 1 tab PO DAILY 12/22/14 08/04/21 History





[Janumet 50-1,000 mg Tablet]    


 


Aspirin EC [Ecotrin Low Dose] 81 mg PO DAILY 06/01/16 08/04/21 History


 


Canagliflozin [Invokana] 100 mg PO DAILY 06/01/16 08/04/21 History


 


Metoprolol Tartrate [Lopressor] 25 mg PO BID #60 tab 10/07/18 08/04/21 Rx


 


Nitroglycerin Sl Tabs [Nitrostat] 0.4 mg SUBLINGUAL Q5M PRN #30 tab 10/07/18 

08/04/21 Rx


 


lisinopriL [Zestril] 2.5 mg PO DAILY  tab 10/07/18 08/04/21 Rx


 


Insulin Lispro [humaLOG Kwikpen] 10 unit SQ AC-TID 08/04/21 08/04/21 History








                                    Allergies











Allergy/AdvReac Type Severity Reaction Status Date / Time


 


No Known Allergies Allergy   Verified 08/04/21 15:43














Physical Exam


Vitals: 


                                   Vital Signs











  Temp Pulse Pulse Resp BP BP Pulse Ox


 


 08/05/21 07:31     20   


 


 08/05/21 00:51  98.5 F   86  20   103/63  95


 


 08/04/21 20:13  99.2 F  101 H   18  115/69   96


 


 08/04/21 20:00  98.7 F   109 H  18   129/70  95


 


 08/04/21 19:39   102 H   18  136/70   97


 


 08/04/21 18:58   98   16  122/70   100


 


 08/04/21 18:20   96   20  148/78   92 L


 


 08/04/21 17:25   96   16  133/68   97


 


 08/04/21 16:11   94   16  131/63   97


 


 08/04/21 15:20   92   20  117/86   97


 


 08/04/21 14:25   85   20  138/81   98


 


 08/04/21 13:03  97.7 F  90   18  147/68   98








                                Intake and Output











 08/04/21 08/05/21 08/05/21





 22:59 06:59 14:59


 


Intake Total  110.522 


 


Balance  110.522 


 


Intake:   


 


  Intake, IV Titration  110.522 





  Amount   


 


    Heparin Sod,Pork in 0.45%  110.522 





    NaCl 25,000 unit In 0.45   





    % NaCl 1 250ml.bag @ 9.8   





    UNITS/KG/HR 10.002 mls/hr   





    IV .Q24H [facility] Rx#:   





    [id number]   


 


Other:   


 


  # Voids 1 2 


 


  Weight 102.058 kg  














Results





                                 08/04/21 14:51





                                 08/04/21 14:51


                                 Cardiac Enzymes











  08/04/21 08/04/21 08/04/21 Range/Units





  14:51 14:51 18:35 


 


AST  44    (17-59)  U/L


 


Troponin I   <0.012  <0.012  (0.000-0.034)  ng/mL














  08/04/21 Range/Units





  23:43 


 


AST   (17-59)  U/L


 


Troponin I  <0.012  (0.000-0.034)  ng/mL








                                   Coagulation











  08/04/21 08/04/21 Range/Units





  14:51 23:43 


 


PT  10.5   (9.0-12.0)  sec


 


APTT  23.5  28.8  (22.0-30.0)  sec








                                       CBC











  08/04/21 Range/Units





  14:51 


 


WBC  8.5  (3.8-10.6)  k/uL


 


RBC  4.87  (4.30-5.90)  m/uL


 


Hgb  14.7  (13.0-17.5)  gm/dL


 


Hct  44.0  (39.0-53.0)  %


 


Plt Count  117 L  (150-450)  k/uL








                          Comprehensive Metabolic Panel











  08/04/21 Range/Units





  14:51 


 


Sodium  142  (137-145)  mmol/L


 


Potassium  4.2  (3.5-5.1)  mmol/L


 


Chloride  107  ()  mmol/L


 


Carbon Dioxide  23  (22-30)  mmol/L


 


BUN  19  (9-20)  mg/dL


 


Creatinine  0.71  (0.66-1.25)  mg/dL


 


Glucose  159 H  (74-99)  mg/dL


 


Calcium  10.4 H  (8.4-10.2)  mg/dL


 


AST  44  (17-59)  U/L


 


ALT  27  (4-49)  U/L


 


Alkaline Phosphatase  91  ()  U/L


 


Total Protein  7.2  (6.3-8.2)  g/dL


 


Albumin  4.5  (3.5-5.0)  g/dL








                               Current Medications











Generic Name Dose Route Start Last Admin





  Trade Name Freq  PRN Reason Stop Dose Admin


 


Allopurinol  100 mg  08/05/21 07:00 





  Allopurinol 100 Mg Tab  PO  





  DAILY [facility]  


 


Aspirin  81 mg  08/05/21 07:00 





  Aspirin 81 Mg  PO  





  DAILY [facility]  


 


Atorvastatin Calcium  20 mg  08/05/21 07:00 





  Atorvastatin 20 Mg Tab  PO  





  DAILY [facility]  


 


Heparin Sodium (Porcine)  0 unit  08/05/21 02:45  08/05/21 03:10





  Heparin Sodium 1,000 Un/Ml (10ml Vl)  IV   4,000 unit





  PER PROTOCOL PRN   Administration





  Low PTT  





  Protocol  


 


Heparin Sodium/Sodium Chloride  250 mls @ 10.002 mls/hr  08/04/21 16:00  

08/05/21 03:13





  25,000 unit/ Sodium Chloride  IV   12.8 units/kg/hr





  .Q24H [facility]MO   13.063 mls/hr





    Titration





  Protocol  





  9.8 UNITS/KG/HR  


 


Insulin Aspart  10 unit  08/04/21 23:00  08/05/21 01:42





  Insulin Aspart (Novolog) 100 Unit/Ml Vial  SQ   Not Given





  AC-TID [facility]  


 


Insulin Aspart  0 unit  08/05/21 00:31  08/05/21 02:40





  Insulin Aspart (Novolog) 100 Unit/Ml Vial  SQ   Not Given





  ACHS [facility]  





  Protocol  


 


Insulin Detemir  65 unit  08/04/21 23:00  08/05/21 02:39





  Insulin Detemir (Levemir) 100 Unit/Ml Syr  SQ   65 unit





  [facility] CHEMO   Administration


 


Linagliptin  5 mg  08/05/21 07:00 





  Linagliptin 5 Mg Tablet  PO  





  DAILY [facility]  


 


Lisinopril  2.5 mg  08/05/21 07:00 





  Lisinopril 2.5 Mg Tab  PO  





  DAILY [facility]  


 


Metformin HCl  1,000 mg  08/05/21 09:00 





  Metformin 500 Mg Tab  PO  





  DAILY [facility]  


 


Metoprolol Tartrate  25 mg  08/05/21 07:00 





  Metoprolol Tartrate 25 Mg Tab  PO  





  BID [facility]  


 


Nitroglycerin  0.4 mg  08/04/21 16:56  08/05/21 00:07





  Nitroglycerin Sl Tabs 0.4 Mg Tab  SUBLINGUAL   0.4 mg





  Q5M PRN   Administration





  Chest Pain  


 


Nitroglycerin  1 inch  08/04/21 18:00  08/05/21 06:24





  Nitroglycerin Oint 1 Inch/Gm Packet  TOPICAL   Not Given





  Q6HR [facility]  


 


Nitroglycerin  0.4 mg  08/04/21 22:45 





  Nitroglycerin Sl Tabs 0.4 Mg Tab  SUBLINGUAL  





  Q5M PRN  





  Chest Pain  


 


Non-Formulary Medication  100 mg  08/05/21 07:00 





  Canagliflozin [Invokana]  PO  





  DAILY [facility]  








                                Intake and Output











 08/04/21 08/05/21 08/05/21





 22:59 06:59 14:59


 


Intake Total  110.522 


 


Balance  110.522 


 


Intake:   


 


  Intake, IV Titration  110.522 





  Amount   


 


    Heparin Sod,Pork in 0.45%  110.522 





    NaCl 25,000 unit In 0.45   





    % NaCl 1 250ml.bag @ 9.8   





    UNITS/KG/HR 10.002 mls/hr   





    IV .Q24H [facility] Rx#:   





    [id number]   


 


Other:   


 


  # Voids 1 2 


 


  Weight 102.058 kg  








                                        





                                 08/04/21 14:51 





                                 08/04/21 14:51

## 2021-08-05 NOTE — P.EN
A team note





Activated at 11:59 pm. Arrived on the scene shortly after.  Reviewed the chart 

discussed the case with the RN.  The patient was admitted earlier tonight with 

complaints of chest pain.  He was started on heparin infusion with cardiology 

consulted.  The patient had reported ongoing chest pain to the RN who 

subsequently activated A-team. EKG obtained at the scene was reviewed showing 

NSR @ 94 bpm w/ 1 mm ST-segment elevations noted in inferior leads, similar to 

EKG from 8/5 @ 1600. The patient noted that he continues to have intermittent 

sharp substernal chest discomfort, pleuritic in nature, at maximal intensity 6 

out of 10, currently at a 1 out of 10.  Denied associated shortness of breath, 

nausea, diaphoresis, or dizziness.  Denied recent immobilization or travel.  

Denied noticing unilateral lower extremity swelling or pain.  At time of 

evaluation, the patient's vitals were /65, pulse 108, and SpO2 93% on 2 L 

of nasal cannula oxygen.





Physical examination:


General: non toxic, no distress, appears at stated age, obese


Derm: no unusual rashes/lesions no unusual ecchymoses, warm, dry


Head: atraumatic, normocephalic, symmetric


Eyes: EOMI, no lid lag, anicteric sclera, pupils equal round reactive to light


ENT: Nose and ears atraumatic, no thrush,  no pharyngeal erythema


Neck: No thyromegaly, no cervical lymphadenopathy, trachea midline, supple


Mouth: no lip lesion, mucus membranes moist


Cardiovascular: S1S2 reg, no murmur, positive posterior tibial pulse bilateral, 

no edema, capillary refill less than 2 seconds


Lungs: CTA bilateral, no rhonchi, no rales , no accessory muscle use


Abdominal: soft,  nontender to palpation, no guarding, no appreciable 

organomegaly, normal bowel sounds


Ext: no gross muscle atrophy,  muscle strength 5 out of 5 in all 4 extremities 

grossly, no contractures, 


Neuro:  CN II-XI grossly intact, light touch intact all 4 extremities, finger to

nose within normal limits,


Psych: Alert, oriented, appropriate affect 





Assessment/plan





Chest pain, pleuritic


-D-dimer ordered


-RN sent EKG to Cardiologist for review


-Continue with Heparin infusion for now


-Nitroglycerin patch


-C/w Aspirin





Total time spent providing critical care to this patient: 35 minutes

## 2021-08-05 NOTE — ECHOF
Referral Reason:cp



MEASUREMENTS

--------

HEIGHT: 177.8 cm

WEIGHT: 102.1 kg

BP: 103/63

RVIDd:   3.1 cm     (< 3.3)

IVSd:   1.6 cm     (0.6 - 1.1)

LVIDd:   4.9 cm     (3.9 - 5.3)

LVPWd:   1.6 cm     (0.6 - 1.1)

IVSs:   2.2 cm

LVIDs:   2.9 cm

LVPWs:   2.1 cm

LAESV Index (A-L):   24.41 ml/m

Ao Diam:   3.6 cm     (2.0 - 3.7)

AV Cusp:   2.0 cm     (1.5 - 2.6)

MV EXCURSION:   14.991 mm     (> 18.000)

MV EF SLOPE:   76 mm/s     (70 - 150)

EPSS:   1.1 cm

MV E Amadeo:   0.75 m/s

MV DecT:   244 ms

MV A Amadeo:   0.62 m/s

MV E/A Ratio:   1.20 

RAP:   5.00 mmHg

RVSP:   22.35 mmHg







FINDINGS

--------

Sinus rhythm.

This was a technically difficult study with suboptimal views.

The left ventricular size is normal.   There is moderate concentric left ventricular hypertrophy.   O
verall left ventricular systolic function is low-normal with, an EF between 50 - 55 %.

The right ventricle is normal in size.

Normal LA  size by volume 22+/-6 ml/m2.

The right atrial size is normal.

5.0mg of Lumason was utilized for enhancement of images

Interatrial and interventricular septum intact.

There is mild aortic valve sclerosis.   There is no evidence of aortic regurgitation.   There is no e
vidence of aortic stenosis.

Mild mitral annular calcification present.   Mild mitral regurgitation is present.

The tricuspid valve appears structurally normal.   Mild tricuspid regurgitation present.   Right vent
ricular systolic pressure is normal at < 35 mmHg.   The right ventricular systolic pressure, as measu
red by Doppler, is 22.35mmHg.

There is no pulmonic regurgitation present.

The aortic root size is normal.

IVC Not well visulized.

There is no pericardial effusion.



CONCLUSIONS

--------

1. This was a technically difficult study with suboptimal views.

2. There is moderate concentric left ventricular hypertrophy.

3. Overall left ventricular systolic function is low-normal with, an EF between 50 - 55 %.

4. Normal LA size by volume 22+/-6 ml/m2.

5. There is mild aortic valve sclerosis.

6. Mild mitral regurgitation is present.

7. Mild tricuspid regurgitation present.





SONOGRAPHER: Elizabeth Monge RDCS

## 2021-08-06 VITALS
HEART RATE: 61 BPM | TEMPERATURE: 98.2 F | DIASTOLIC BLOOD PRESSURE: 68 MMHG | SYSTOLIC BLOOD PRESSURE: 115 MMHG | RESPIRATION RATE: 16 BRPM

## 2021-08-06 LAB — GLUCOSE BLD-MCNC: 91 MG/DL (ref 75–99)

## 2021-08-06 RX ADMIN — ATORVASTATIN CALCIUM SCH MG: 40 TABLET, FILM COATED ORAL at 08:32

## 2021-08-06 RX ADMIN — INSULIN ASPART SCH: 100 INJECTION, SOLUTION INTRAVENOUS; SUBCUTANEOUS at 08:27

## 2021-08-06 RX ADMIN — METOPROLOL TARTRATE SCH MG: 25 TABLET, FILM COATED ORAL at 08:32

## 2021-08-06 RX ADMIN — ISOSORBIDE MONONITRATE SCH MG: 30 TABLET, EXTENDED RELEASE ORAL at 08:32

## 2021-08-06 RX ADMIN — INSULIN ASPART SCH UNIT: 100 INJECTION, SOLUTION INTRAVENOUS; SUBCUTANEOUS at 08:31

## 2021-08-06 RX ADMIN — ASPIRIN SCH: 325 TABLET ORAL at 08:28

## 2021-08-06 RX ADMIN — ASPIRIN 81 MG CHEWABLE TABLET SCH MG: 81 TABLET CHEWABLE at 08:32

## 2021-08-06 RX ADMIN — LINAGLIPTIN SCH MG: 5 TABLET, FILM COATED ORAL at 08:33

## 2021-08-06 NOTE — P.DS
Providers


Date of admission: 


08/04/21 16:56





Expected date of discharge: 08/06/21


Attending physician: 


Maco Christiansen





Consults: 





                                        





08/04/21 16:56


Consult Physician Urgent 


   Consulting Provider: [facility]


   Consult Reason/Comments: Unstable angina


   Do you want consulting provider notified?: Yes











Primary care physician: 


Maco Christiansen





[facility] Course: 





HISTORY OF PRESENT ILLNESS


This is an 81-year-old male patient of Dr. Christiansen with past medical history of 

coronary artery disease, hypertension, hyperlipidemia, diabetes mellitus type 2,

generalized anxiety disorder, bladder cancer under the care of Dr. Chris, 

chronic gout.  Patient states that he was seen by Dr. Chris yesterday in his 

offic.e Patient is undergoing BCG injections and he is to return in 1 month.  

While he was at the office with Dr. Chris, patient told him that he had been 

having midsternal chest pain that was coming and going with radiation to his 

neck and also into his jaw.  Dr. Chris instructed him to go to the hospital for

further evaluation.  Patient states that he has noticed chest pain while he is 

pulling weeds and with other activities but also has chest pain with deep 

breathing.  In 2018, patient underwent cardiac catheterization that revealed 

left main no high-grade stenosis, LAD with an area of 30-40% intimal disease in 

the proximal and midportion, circumflex with a tortuous segment with 99% 

stenosis in the proximal portion and RCA with no evidence of high-grade 

stenosis.  At that time PCI was attempted however was unsuccessful due to heavy 

calcifications.  CT surgery evaluated the patient and due to single-vessel 

disease recommended maximum medical therapy.  He last saw Dr. Maier a couple 

months ago..





Patient presented to [facility] emergency center.  EKG was a 

sinus rhythm with no acute ST changes.  Chest x-ray showed no acute 

cardiopulmonary process. WBC 8.5, hemoglobin 14.7, platelets 117.  Electrolytes 

and renal function were normal.  Blood sugar initially 302.  Magnesium 1.4.  

Calcium 10.4.  Liver function tests were normal.  Troponin negative on 3 draws. 

Albumin 4.5.  Triglycerides 175, cholesterol 171, LDL 96, HDL 40.  Patient was 

placed on the observation unit, started on heparin drip and cardiology consult 

requested.





8/6: Patient was seen by cardiology and started on Imdur or 30 mg daily. Patient

states he walked the halls 3 times and did not experience any chest pain, 

shortness of breath, lightheadedness, dizziness.


Patient has been afebrile, heart rate 79, blood pressure 107/55, pulse ox 93% on

room air.  Capillary blood glucose running between .


Echocardiogram reveals EF of 5055% with moderate concentric left hypertrophy, 

mild aortic valve sclerosis, mild mitral regurgitation, mild tricuspid regu

rgitation.


Patient will be discharged home today in stable condition.





ASSESSMENT AND PLAN


1.  Chest pain with negative troponins suggestive of unstable angina. 


2.  Hypertension.  


3.  Hyperlipidemia.  


4.  Diabetes mellitus type 2.  


5.  Generalized anxiety disorder, stable.


6.  History of bladder cancer under the care Dr Chris.


7.  Chronic gout.  





DISCHARGE PLAN


Home.





Impression and plan of care have been directed as dictated by the signing 

physician.  Millie Crump nurse practitioner acting as scribe for signing 

physician.


Patient Condition at Discharge: Good





Plan - Discharge Summary


Discharge Rx Participant: No


New Discharge Prescriptions: 


New


   Isosorbide Mononitrate ER [Imdur] 30 mg PO DAILY #30 tab.er.24h





Continue


   sitaGLIPtin PHOS/metFORMIN HCL [Janumet 50-1,000 mg Tablet] 1 tab PO DAILY


   Rosuvastatin Calcium [Crestor] 10 mg PO DAILY


   allopurinoL [Zyloprim] 100 mg PO DAILY


   Insulin Glargine,Hum.rec.anlog [Lantus Solostar] 65 unit SQ HS


   Canagliflozin [Invokana] 100 mg PO DAILY


   Aspirin EC [Ecotrin Low Dose] 81 mg PO DAILY


   lisinopriL [Zestril] 2.5 mg PO DAILY  tab


   Metoprolol Tartrate [Lopressor] 25 mg PO BID #60 tab


   Nitroglycerin Sl Tabs [Nitrostat] 0.4 mg SUBLINGUAL Q5M PRN #30 tab


     PRN Reason: Chest Pain


   Insulin Lispro [humaLOG Kwikpen] 10 unit SQ AC-TID


Discharge Medication List





Insulin Glargine,Hum.rec.anlog [Lantus Solostar] 65 unit SQ HS 12/22/14 

[History]


Rosuvastatin Calcium [Crestor] 10 mg PO DAILY 12/22/14 [History]


allopurinoL [Zyloprim] 100 mg PO DAILY 12/22/14 [History]


sitaGLIPtin PHOS/metFORMIN HCL [Janumet 50-1,000 mg Tablet] 1 tab PO DAILY 

12/22/14 [History]


Aspirin EC [Ecotrin Low Dose] 81 mg PO DAILY 06/01/16 [History]


Canagliflozin [Invokana] 100 mg PO DAILY 06/01/16 [History]


Metoprolol Tartrate [Lopressor] 25 mg PO BID #60 tab 10/07/18 [Rx]


Nitroglycerin Sl Tabs [Nitrostat] 0.4 mg SUBLINGUAL Q5M PRN #30 tab 10/07/18 

[Rx]


lisinopriL [Zestril] 2.5 mg PO DAILY  tab 10/07/18 [Rx]


Insulin Lispro [humaLOG Kwikpen] 10 unit SQ AC-TID 08/04/21 [History]


Isosorbide Mononitrate ER [Imdur] 30 mg PO DAILY #30 tab.er.24h 08/05/21 [Rx]








Follow up Appointment(s)/Referral(s): 


Brandon Maier MD [STAFF PHYSICIAN] - 2 Weeks


Maco Christiansen MD [Primary Care Provider] - 1 Week


Discharge Disposition: HOME SELF-CARE

## 2021-08-06 NOTE — PN
PROGRESS NOTE



Mr. Magaña is an 81-year-old male with known history of coronary artery disease who

presented with symptoms of neck discomfort and chest discomfort.  He is feeling well

this morning.  His breathing has been stable.  He has been ambulating without

difficulty.  He denies any dizziness or palpitation.  He denies any nausea and

vomiting.  No cough or fever.  He had an echocardiogram performed yesterday that showed

a preserved left ventricular size and with an ejection fraction of 50% to 55% with mild

mitral and tricuspid regurgitation.  He continues to be at this time on aspirin once a

day, Lipitor 40 mg daily, Invokana, insulin, isosorbide mononitrate 30 mg daily,

Tradjenta, Zestril 2.5 mg daily, metformin 1 g daily, and metoprolol tartrate 25 mg

twice a day.



PHYSICAL EXAMINATION:

Blood pressure 107/50 with a heart rate in the 70s.  Lungs:  Clear.  Heart:  Regular

rate and rhythm S1, S2.  No S3.  No rub.  Abdomen:  Soft and nontender.  Extremities:

No edema.



IMPRESSION:

1. Chest discomfort with no evidence for acute coronary syndrome.  Stable.

2. History of coronary artery disease.

3. History of hyperlipidemia.

4. History of diabetes mellitus.



RECOMMENDATIONS:

Patient should be able to be discharged home from the cardiac standpoint and followed

as an outpatient and depending on his progress, further recommendations will be made.





MMODL / IJN: [id number] / Job#: [id number]

## 2021-08-09 ENCOUNTER — HOSPITAL ENCOUNTER (EMERGENCY)
Dept: HOSPITAL 47 - EC | Age: 81
Discharge: HOME | End: 2021-08-09
Payer: MEDICARE

## 2021-08-09 VITALS — RESPIRATION RATE: 20 BRPM | SYSTOLIC BLOOD PRESSURE: 130 MMHG | HEART RATE: 76 BPM | DIASTOLIC BLOOD PRESSURE: 69 MMHG

## 2021-08-09 VITALS — TEMPERATURE: 97.9 F

## 2021-08-09 DIAGNOSIS — Z79.84: ICD-10-CM

## 2021-08-09 DIAGNOSIS — E11.9: ICD-10-CM

## 2021-08-09 DIAGNOSIS — Z79.82: ICD-10-CM

## 2021-08-09 DIAGNOSIS — M79.606: ICD-10-CM

## 2021-08-09 DIAGNOSIS — Z79.899: ICD-10-CM

## 2021-08-09 DIAGNOSIS — R06.00: ICD-10-CM

## 2021-08-09 DIAGNOSIS — E78.5: ICD-10-CM

## 2021-08-09 DIAGNOSIS — R07.89: Primary | ICD-10-CM

## 2021-08-09 DIAGNOSIS — I10: ICD-10-CM

## 2021-08-09 LAB
ALBUMIN SERPL-MCNC: 3.9 G/DL (ref 3.5–5)
ALP SERPL-CCNC: 92 U/L (ref 38–126)
ALT SERPL-CCNC: 18 U/L (ref 4–49)
ANION GAP SERPL CALC-SCNC: 11 MMOL/L
APTT BLD: 22.4 SEC (ref 22–30)
AST SERPL-CCNC: 32 U/L (ref 17–59)
BASOPHILS # BLD AUTO: 0 K/UL (ref 0–0.2)
BASOPHILS NFR BLD AUTO: 1 %
BUN SERPL-SCNC: 31 MG/DL (ref 9–20)
CALCIUM SPEC-MCNC: 9.5 MG/DL (ref 8.4–10.2)
CHLORIDE SERPL-SCNC: 109 MMOL/L (ref 98–107)
CO2 SERPL-SCNC: 19 MMOL/L (ref 22–30)
EOSINOPHIL # BLD AUTO: 0.2 K/UL (ref 0–0.7)
EOSINOPHIL NFR BLD AUTO: 2 %
ERYTHROCYTE [DISTWIDTH] IN BLOOD BY AUTOMATED COUNT: 4.35 M/UL (ref 4.3–5.9)
ERYTHROCYTE [DISTWIDTH] IN BLOOD: 15.1 % (ref 11.5–15.5)
GLUCOSE SERPL-MCNC: 169 MG/DL (ref 74–99)
HCT VFR BLD AUTO: 39.3 % (ref 39–53)
HGB BLD-MCNC: 13.4 GM/DL (ref 13–17.5)
INR PPP: 1 (ref ?–1.2)
LIPASE SERPL-CCNC: 38 U/L (ref 23–300)
LYMPHOCYTES # SPEC AUTO: 1.5 K/UL (ref 1–4.8)
LYMPHOCYTES NFR SPEC AUTO: 19 %
MAGNESIUM SPEC-SCNC: 1.7 MG/DL (ref 1.6–2.3)
MCH RBC QN AUTO: 30.7 PG (ref 25–35)
MCHC RBC AUTO-ENTMCNC: 34 G/DL (ref 31–37)
MCV RBC AUTO: 90.4 FL (ref 80–100)
MONOCYTES # BLD AUTO: 0.5 K/UL (ref 0–1)
MONOCYTES NFR BLD AUTO: 7 %
NEUTROPHILS # BLD AUTO: 5.4 K/UL (ref 1.3–7.7)
NEUTROPHILS NFR BLD AUTO: 69 %
PLATELET # BLD AUTO: 154 K/UL (ref 150–450)
POTASSIUM SERPL-SCNC: 4.5 MMOL/L (ref 3.5–5.1)
PROT SERPL-MCNC: 6.6 G/DL (ref 6.3–8.2)
PT BLD: 10.5 SEC (ref 9–12)
SODIUM SERPL-SCNC: 139 MMOL/L (ref 137–145)
WBC # BLD AUTO: 7.9 K/UL (ref 3.8–10.6)

## 2021-08-09 PROCEDURE — 71046 X-RAY EXAM CHEST 2 VIEWS: CPT

## 2021-08-09 PROCEDURE — 80053 COMPREHEN METABOLIC PANEL: CPT

## 2021-08-09 PROCEDURE — 36415 COLL VENOUS BLD VENIPUNCTURE: CPT

## 2021-08-09 PROCEDURE — 93005 ELECTROCARDIOGRAM TRACING: CPT

## 2021-08-09 PROCEDURE — 85730 THROMBOPLASTIN TIME PARTIAL: CPT

## 2021-08-09 PROCEDURE — 71275 CT ANGIOGRAPHY CHEST: CPT

## 2021-08-09 PROCEDURE — 96374 THER/PROPH/DIAG INJ IV PUSH: CPT

## 2021-08-09 PROCEDURE — 84484 ASSAY OF TROPONIN QUANT: CPT

## 2021-08-09 PROCEDURE — 85379 FIBRIN DEGRADATION QUANT: CPT

## 2021-08-09 PROCEDURE — 83690 ASSAY OF LIPASE: CPT

## 2021-08-09 PROCEDURE — 99285 EMERGENCY DEPT VISIT HI MDM: CPT

## 2021-08-09 PROCEDURE — 85025 COMPLETE CBC W/AUTO DIFF WBC: CPT

## 2021-08-09 PROCEDURE — 85610 PROTHROMBIN TIME: CPT

## 2021-08-09 PROCEDURE — 83735 ASSAY OF MAGNESIUM: CPT

## 2021-08-09 NOTE — XR
EXAMINATION TYPE: XR chest 2V

 

DATE OF EXAM: 8/9/2021

 

COMPARISON: 8/4/2021

 

INDICATION: Chest pain

 

TECHNIQUE:  Frontal and lateral views of the chest are obtained.

 

FINDINGS:  

The heart size is normal.  

The pulmonary vasculature is normal.

There may be minimal infiltrate at the left costophrenic angle. Correlate for atelectasis..

 

IMPRESSION:  

1. Suggestion of minimal atelectasis left costophrenic angle.

## 2021-08-09 NOTE — P.CRDCN
History of Present Illness


Consult date: 08/09/21


History of present illness: 





This is an informal consultation as the patient was evaluated in the emergency 

room and not admitted to the hospital. There was no formal consult placed as the

patient was discharged home. However, cardiology was asked to evaluate patient 

by the ER physician prior to discharge.





This is an 81-year-old man with a history of coronary artery disease, 

hypertension, hyperlipidemia, and bladder cancer who follows in the office with 

Dr. Maier.  The patient presented to the emergency room with a chief complaint 

of chest pain. The patient did have a cardiac catheterization performed in 2018 

secondary to an abnormal stress test revealing left main no high-grade stenosis,

LAD with an area of 30-40% intimal disease in the proximal and midportion, 

circumflex with a tortuous segment with 99% stenosis in the proximal portion and

RCA with no evidence of high-grade stenosis.  At that time PCI was attempted 

however was unsuccessful due to heavy calcifications. Echocardiogram performed 

08/05/2021 revealed ejection fraction 50-55%.  Mild mitral regurg patient.  Mild

tricuspid regurgitation.  





Dr. Alvarado was called by the emergency room physician to evaluate the patient.  

The patient was examined in the emergency room.  The patient states his pain is 

worse with deep inspiration and with palpation.  He denies any radiation of the 

pain.  He denied any shortness of breath.  The patient's symptoms are very 

atypical for angina.  Patient's EKG does not reveal any ischemic changes.  He 

has 2 negative troponins.  Per Dr. Alvarado, the patient may be discharged home 

today from the emergency room. He does not require inpatient admission. He is to

follow up outpatient with Dr. Maier. 





Nurse practitioner note has been reviewed by physician. Signing provider agrees 

with the documented findings, assessment, and plan of care. 





Past Medical History


Past Medical History: Coronary Artery Disease (CAD), Cancer, Diabetes Mellitus, 

Hyperlipidemia, Hypertension


Additional Past Medical History / Comment(s): BPH bladder cancer


History of Any Multi-Drug Resistant Organisms: None Reported


Past Surgical History: Appendectomy, Bladder Surgery, Joint Replacement, 

Tonsillectomy


Additional Past Surgical History / Comment(s): left knee replaced, right rotator

cuff SX, COLONOSCOPY,


Past Anesthesia/Blood Transfusion Reactions: No Reported Reaction


Past Psychological History: No Psychological Hx Reported


Smoking Status: Never smoker


Past Alcohol Use History: Occasional


Past Drug Use History: None Reported





- Past Family History


  ** Mother


Family Medical History: No Reported History


Additional Family Medical History / Comment(s): Mother had history of congenital

heart disease, CHF.





  ** Father


Family Medical History: No Reported History, CVA/TIA


Additional Family Medical History / Comment(s): Father contracted malaria.  

History of CVA.





  ** Brother(s)


Family Medical History: No Reported History


Additional Family Medical History / Comment(s): Brother has history of 

headaches.





  ** Sister(s)


Family Medical History: No Reported History





  ** Daughter(s)


Family Medical History: No Reported History


Additional Family Medical History / Comment(s): Patient has one daughter with no

major medical problems.





  ** Son(s)


Family Medical History: No Reported History


Additional Family Medical History / Comment(s): Patient has one son with no 

major medical problems.





Medications and Allergies


                                Home Medications











 Medication  Instructions  Recorded  Confirmed  Type


 


Insulin Glargine,Hum.rec.anlog 65 unit SQ HS 12/22/14 08/09/21 History





[Lantus Solostar]    


 


Rosuvastatin Calcium [Crestor] 10 mg PO DAILY 12/22/14 08/09/21 History


 


allopurinoL [Zyloprim] 100 mg PO DAILY 12/22/14 08/09/21 History


 


sitaGLIPtin PHOS/metFORMIN HCL 1 tab PO DAILY 12/22/14 08/09/21 History





[Janumet 50-1,000 mg Tablet]    


 


Aspirin EC [Ecotrin Low Dose] 81 mg PO DAILY 06/01/16 08/09/21 History


 


Nitroglycerin Sl Tabs [Nitrostat] 0.4 mg SUBLINGUAL Q5M PRN #30 tab 10/07/18 

08/09/21 Rx


 


lisinopriL [Zestril] 2.5 mg PO DAILY  tab 10/07/18 08/09/21 Rx


 


Insulin Lispro [humaLOG Kwikpen] 10 unit SQ AC-TID 08/04/21 08/09/21 History


 


Isosorbide Mononitrate ER [Imdur] 30 mg PO DAILY #30 tab.er.24h 08/05/21 08/09/21 Rx








                                    Allergies











Allergy/AdvReac Type Severity Reaction Status Date / Time


 


No Known Allergies Allergy   Verified 08/09/21 11:35














Physical Exam


Vitals: 


                                   Vital Signs











  Temp Pulse Resp BP Pulse Ox


 


 08/09/21 13:07   84  18  127/84  96


 


 08/09/21 11:00    18  


 


 08/09/21 10:22  97.9 F  79  18  118/65  96








                                Intake and Output











 08/08/21 08/09/21 08/09/21





 22:59 06:59 14:59


 


Other:   


 


  Weight   102.058 kg














Results





                                 08/09/21 10:52





                                 08/09/21 10:52


                                 Cardiac Enzymes











  08/09/21 08/09/21 08/09/21 Range/Units





  10:52 10:52 13:34 


 


AST  32    (17-59)  U/L


 


Troponin I   <0.012  <0.012  (0.000-0.034)  ng/mL








                                   Coagulation











  08/09/21 Range/Units





  10:52 


 


PT  10.5  (9.0-12.0)  sec


 


APTT  22.4  (22.0-30.0)  sec








                                       CBC











  08/09/21 Range/Units





  10:52 


 


WBC  7.9  (3.8-10.6)  k/uL


 


RBC  4.35  (4.30-5.90)  m/uL


 


Hgb  13.4  (13.0-17.5)  gm/dL


 


Hct  39.3  (39.0-53.0)  %


 


Plt Count  154  (150-450)  k/uL








                          Comprehensive Metabolic Panel











  08/09/21 Range/Units





  10:52 


 


Sodium  139  (137-145)  mmol/L


 


Potassium  4.5  (3.5-5.1)  mmol/L


 


Chloride  109 H  ()  mmol/L


 


Carbon Dioxide  19 L  (22-30)  mmol/L


 


BUN  31 H  (9-20)  mg/dL


 


Creatinine  0.86  (0.66-1.25)  mg/dL


 


Glucose  169 H  (74-99)  mg/dL


 


Calcium  9.5  (8.4-10.2)  mg/dL


 


AST  32  (17-59)  U/L


 


ALT  18  (4-49)  U/L


 


Alkaline Phosphatase  92  ()  U/L


 


Total Protein  6.6  (6.3-8.2)  g/dL


 


Albumin  3.9  (3.5-5.0)  g/dL








                                Intake and Output











 08/08/21 08/09/21 08/09/21





 22:59 06:59 14:59


 


Other:   


 


  Weight   102.058 kg








                                 Patient Weight











 08/10/21





 06:59


 


Weight 102.058 kg








                                        





                                 08/09/21 10:52 





                                 08/09/21 10:52

## 2021-08-09 NOTE — ED
General Adult HPI





- General


Chief complaint: Chest Pain


Stated complaint: chest pain/leg pain


Time Seen by Provider: 08/09/21 10:27


Source: patient, RN notes reviewed, old records reviewed


Mode of arrival: ambulatory


Limitations: no limitations





- History of Present Illness


Initial comments: 





81-year-old male presents for evaluation of left-sided chest pain.  Pain is 

worse with deep inspiration.  Has been present throughout the morning today.  He

was recently seen at this institution for chest pain was admitted, evaluated.  

He states that the pain is having today is different from the pain that brought 

him in one week ago.  He does report some mild dyspnea.  No cough.  No fever.  

No abdominal pain.





- Related Data


                                Home Medications











 Medication  Instructions  Recorded  Confirmed


 


Insulin Glargine,Hum.rec.anlog 65 unit SQ HS 12/22/14 08/09/21





[Lantus Solostar]   


 


Rosuvastatin Calcium [Crestor] 10 mg PO DAILY 12/22/14 08/09/21


 


allopurinoL [Zyloprim] 100 mg PO DAILY 12/22/14 08/09/21


 


sitaGLIPtin PHOS/metFORMIN HCL 1 tab PO DAILY 12/22/14 08/09/21





[Janumet 50-1,000 mg Tablet]   


 


Aspirin EC [Ecotrin Low Dose] 81 mg PO DAILY 06/01/16 08/09/21


 


Insulin Lispro [humaLOG Kwikpen] 10 unit SQ AC-TID 08/04/21 08/09/21








                                  Previous Rx's











 Medication  Instructions  Recorded


 


Nitroglycerin Sl Tabs [Nitrostat] 0.4 mg SUBLINGUAL Q5M PRN #30 tab 10/07/18


 


lisinopriL [Zestril] 2.5 mg PO DAILY  tab 10/07/18


 


Isosorbide Mononitrate ER [Imdur] 30 mg PO DAILY #30 tab.er.24h 08/05/21











                                    Allergies











Allergy/AdvReac Type Severity Reaction Status Date / Time


 


No Known Allergies Allergy   Verified 08/09/21 11:35














Review of Systems


ROS Statement: 


Those systems with pertinent positive or pertinent negative responses have been 

documented in the HPI.





ROS Other: All systems not noted in ROS Statement are negative.





Past Medical History


Past Medical History: Coronary Artery Disease (CAD), Cancer, Diabetes Mellitus, 

Hyperlipidemia, Hypertension


Additional Past Medical History / Comment(s): BPH bladder cancer


History of Any Multi-Drug Resistant Organisms: None Reported


Past Surgical History: Appendectomy, Bladder Surgery, Joint Replacement, 

Tonsillectomy


Additional Past Surgical History / Comment(s): left knee replaced, right rotator

 cuff SX, COLONOSCOPY,


Past Anesthesia/Blood Transfusion Reactions: No Reported Reaction


Past Psychological History: No Psychological Hx Reported


Smoking Status: Never smoker


Past Alcohol Use History: Occasional


Past Drug Use History: None Reported





- Past Family History


  ** Mother


Family Medical History: No Reported History


Additional Family Medical History / Comment(s): Mother had history of congenital

 heart disease, CHF.





  ** Father


Family Medical History: No Reported History, CVA/TIA


Additional Family Medical History / Comment(s): Father contracted malaria.  

History of CVA.





  ** Brother(s)


Family Medical History: No Reported History


Additional Family Medical History / Comment(s): Brother has history of 

headaches.





  ** Sister(s)


Family Medical History: No Reported History





  ** Daughter(s)


Family Medical History: No Reported History


Additional Family Medical History / Comment(s): Patient has one daughter with no

 major medical problems.





  ** Son(s)


Family Medical History: No Reported History


Additional Family Medical History / Comment(s): Patient has one son with no ma

tim medical problems.





General Exam


Limitations: no limitations


General appearance: alert, in no apparent distress


Head exam: Present: atraumatic, normocephalic


Eye exam: Present: normal appearance, PERRL


ENT exam: Present: normal exam


Neck exam: Present: normal inspection.  Absent: tenderness


Respiratory exam: Present: normal lung sounds bilaterally.  Absent: respiratory 

distress, wheezes, rales


Cardiovascular Exam: Present: regular rate, normal rhythm


GI/Abdominal exam: Present: soft.  Absent: distended, tenderness, guarding


Extremities exam: Present: normal inspection, normal capillary refill.  Absent: 

pedal edema, calf tenderness


Neurological exam: Present: alert, oriented X3, CN II-XII intact.  Absent: motor

 sensory deficit


Psychiatric exam: Present: normal affect, normal mood


Skin exam: Present: warm, dry, intact.  Absent: cyanosis, diaphoretic





Course


                                   Vital Signs











  08/09/21 08/09/21 08/09/21





  10:22 11:00 13:07


 


Temperature 97.9 F  


 


Pulse Rate 79  84


 


Respiratory 18 18 18





Rate   


 


Blood Pressure 118/65  127/84


 


O2 Sat by Pulse 96  96





Oximetry   














EKG Findings





- EKG Comments:


EKG Findings:: EKG: Sinus rhythm rate of 75, WY interval 174, QRS duration 92, 

, no ST segment elevation





Medical Decision Making





- Medical Decision Making





81-year-old male with presented for evaluation of left-sided chest pain.  Pain 

was atypical.  He was recently admitted to this institution with chest pain.  

EKG was sinus rhythm without ST segment elevation.  Chest x-ray is negative.  

CBC unremarkable.  Initial troponin negative, d-dimer was 1.19.  Therefore CT 

angiography was performed.  CT was negative for pulmonary embolism.  I did 

initially discuss case with Dr. Hannah glaser who is the patient's primary care 

physician who recommended that I speak with cardiology given this patient had a 

heart catheterization in 2018 that was not able to successfully stent at that 

time.  I discussed case both with Dr. Alvarado and with Dr. Maier.  Given the 

atypical features of this chest pain nonischemic EKG and negative initial 

troponin Dr. Alvarado does recommend a repeat troponin which is performed in the 

emergency department and is negative.  Patient able to be discharged home at 

this time.  Return parameters are discussed.  Patient is agreeable and eager for

 discharge.





- Lab Data


Result diagrams: 


                                 08/09/21 10:52





                                 08/09/21 10:52


                                   Lab Results











  08/09/21 08/09/21 08/09/21 Range/Units





  10:52 10:52 10:52 


 


WBC  7.9    (3.8-10.6)  k/uL


 


RBC  4.35    (4.30-5.90)  m/uL


 


Hgb  13.4    (13.0-17.5)  gm/dL


 


Hct  39.3    (39.0-53.0)  %


 


MCV  90.4    (80.0-100.0)  fL


 


MCH  30.7    (25.0-35.0)  pg


 


MCHC  34.0    (31.0-37.0)  g/dL


 


RDW  15.1    (11.5-15.5)  %


 


Plt Count  154    (150-450)  k/uL


 


MPV  8.1    


 


Neutrophils %  69    %


 


Lymphocytes %  19    %


 


Monocytes %  7    %


 


Eosinophils %  2    %


 


Basophils %  1    %


 


Neutrophils #  5.4    (1.3-7.7)  k/uL


 


Lymphocytes #  1.5    (1.0-4.8)  k/uL


 


Monocytes #  0.5    (0-1.0)  k/uL


 


Eosinophils #  0.2    (0-0.7)  k/uL


 


Basophils #  0.0    (0-0.2)  k/uL


 


PT   10.5   (9.0-12.0)  sec


 


INR   1.0   (<1.2)  


 


APTT   22.4   (22.0-30.0)  sec


 


D-Dimer   1.19 H   (<0.60)  mg/L FEU


 


Sodium    139  (137-145)  mmol/L


 


Potassium    4.5  (3.5-5.1)  mmol/L


 


Chloride    109 H  ()  mmol/L


 


Carbon Dioxide    19 L  (22-30)  mmol/L


 


Anion Gap    11  mmol/L


 


BUN    31 H  (9-20)  mg/dL


 


Creatinine    0.86  (0.66-1.25)  mg/dL


 


Est GFR (CKD-EPI)AfAm    >90  (>60 ml/min/1.73 sqM)  


 


Est GFR (CKD-EPI)NonAf    80  (>60 ml/min/1.73 sqM)  


 


Glucose    169 H  (74-99)  mg/dL


 


Calcium    9.5  (8.4-10.2)  mg/dL


 


Magnesium    1.7  (1.6-2.3)  mg/dL


 


Total Bilirubin    1.1  (0.2-1.3)  mg/dL


 


AST    32  (17-59)  U/L


 


ALT    18  (4-49)  U/L


 


Alkaline Phosphatase    92  ()  U/L


 


Troponin I     (0.000-0.034)  ng/mL


 


Total Protein    6.6  (6.3-8.2)  g/dL


 


Albumin    3.9  (3.5-5.0)  g/dL


 


Lipase    38  ()  U/L














  08/09/21 Range/Units





  10:52 


 


WBC   (3.8-10.6)  k/uL


 


RBC   (4.30-5.90)  m/uL


 


Hgb   (13.0-17.5)  gm/dL


 


Hct   (39.0-53.0)  %


 


MCV   (80.0-100.0)  fL


 


MCH   (25.0-35.0)  pg


 


MCHC   (31.0-37.0)  g/dL


 


RDW   (11.5-15.5)  %


 


Plt Count   (150-450)  k/uL


 


MPV   


 


Neutrophils %   %


 


Lymphocytes %   %


 


Monocytes %   %


 


Eosinophils %   %


 


Basophils %   %


 


Neutrophils #   (1.3-7.7)  k/uL


 


Lymphocytes #   (1.0-4.8)  k/uL


 


Monocytes #   (0-1.0)  k/uL


 


Eosinophils #   (0-0.7)  k/uL


 


Basophils #   (0-0.2)  k/uL


 


PT   (9.0-12.0)  sec


 


INR   (<1.2)  


 


APTT   (22.0-30.0)  sec


 


D-Dimer   (<0.60)  mg/L FEU


 


Sodium   (137-145)  mmol/L


 


Potassium   (3.5-5.1)  mmol/L


 


Chloride   ()  mmol/L


 


Carbon Dioxide   (22-30)  mmol/L


 


Anion Gap   mmol/L


 


BUN   (9-20)  mg/dL


 


Creatinine   (0.66-1.25)  mg/dL


 


Est GFR (CKD-EPI)AfAm   (>60 ml/min/1.73 sqM)  


 


Est GFR (CKD-EPI)NonAf   (>60 ml/min/1.73 sqM)  


 


Glucose   (74-99)  mg/dL


 


Calcium   (8.4-10.2)  mg/dL


 


Magnesium   (1.6-2.3)  mg/dL


 


Total Bilirubin   (0.2-1.3)  mg/dL


 


AST   (17-59)  U/L


 


ALT   (4-49)  U/L


 


Alkaline Phosphatase   ()  U/L


 


Troponin I  <0.012  (0.000-0.034)  ng/mL


 


Total Protein   (6.3-8.2)  g/dL


 


Albumin   (3.5-5.0)  g/dL


 


Lipase   ()  U/L














Disposition


Clinical Impression: 


 Atypical chest pain





Disposition: HOME SELF-CARE


Condition: Good


Instructions (If sedation given, give patient instructions):  Chest Pain (ED)


Is patient prescribed a controlled substance at d/c from ED?: No


Referrals: 


Maco Christiansen MD [Primary Care Provider] - 1-2 days

## 2021-08-09 NOTE — CT
EXAMINATION TYPE: CT angio chest

 

DATE OF EXAM: 8/9/2021

 

COMPARISON: 10/6/2018

 

HISTORY: 81-year-old male Shortness of breath and left sided pain.

 

TECHNIQUE: Contiguous axial scanning of the chest performed with IV Contrast, patient injected with 1
00 mL of Isovue 370. Coronal/sagittal MIP reconstructions performed.

 

CT DLP: 475.7 mGycm

Automated exposure control for dose reduction was used.

 

FINDINGS: 

Heart borderline in size with trace 4 mm thick pericardial effusion. Extensive 3 vessel coronary jorge
ry calcifications are present. No reflux of contrast into the hepatic veins.

 

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 Conventional arch vessel branching anatomy.

 

Redemonstrated goiter is enlargement of the right lobe of the thyroid gland. Scattered nonenlarged me
diastinal lymph nodes measuring up to 9 mm. Mild bilateral gynecomastia.

 

New bilateral infrahilar soft tissue measuring 1.5 cm on the left and 2.0 cm on the right.

 

Satisfactory opacification of the pulmonary arterial system. Large caliber to the main right and left
 pulmonary arteries measuring up to 2.9 cm suggesting underlying pulmonary artery hypertension. Breat
staci motion artifact at the lung bases. This causes some limitation in assessment of the segmental an
d more distal arterial branches, particularly at the left base. No definite pulmonary embolus is seen
.

 

There is a trace left pleural effusion with prominent adjacent patchy left basilar opacity. Dependent
 atelectasis is also present at the right base. Mild to moderate upper lung centrilobular emphysema.

 

Visualized upper abdomen shows a stable mildly enlarged 1.5 cm gastrohepatic ligament lymph node. Per
ipancreatic lymph node 1.7 cm, unchanged.

 

Bones: Degenerative changes at the sternoclavicular joint. Bridging anterior endplate spondylosis low
er thoracic spine.

 

 

IMPRESSION: 

 

1. BORDERLINE CARDIOMEGALY, TRACE PERICARDIAL EFFUSION, CAD WITH EXTENSIVE THREE-VESSEL CORONARY JORGE
RY CALCIFICATIONS, PULMONARY ARTERIAL HYPERTENSION, AND TRACE SUBPULMONIC PLEURAL EFFUSION ON THE LEF
T WITH ADJACENT ATELECTASIS. CORRELATE FOR POSSIBLE MILD CHF.

2. COPD WITH MILD TO MODERATE UPPER LUNG EMPHYSEMA.

3. SOME BREATHING MOTION ARTIFACT ESPECIALLY AT THE LEFT BASE. NO DEFINITE PULMONARY EMBOLUS.

4. NEW BILATERAL INFRAHILAR SOFT TISSUE MEASURING UP TO 2.0 CM ON THE RIGHT AND 1.5 CM ON THE LEFT. S
USPECT REACTIVE BRONCHIAL LYMPH NODES. FOLLOW-UP CONTRAST-ENHANCED CT CHEST IN 3 MONTHS TO ENSURE STA
BILITY/RESOLUTION.

## 2021-09-16 ENCOUNTER — HOSPITAL ENCOUNTER (OUTPATIENT)
Dept: HOSPITAL 47 - CATHCVL | Age: 81
Discharge: HOME | End: 2021-09-16
Attending: INTERNAL MEDICINE
Payer: MEDICARE

## 2021-09-16 VITALS — HEART RATE: 80 BPM | DIASTOLIC BLOOD PRESSURE: 72 MMHG | SYSTOLIC BLOOD PRESSURE: 129 MMHG

## 2021-09-16 VITALS — BODY MASS INDEX: 31.4 KG/M2

## 2021-09-16 VITALS — TEMPERATURE: 98 F

## 2021-09-16 VITALS — RESPIRATION RATE: 16 BRPM

## 2021-09-16 DIAGNOSIS — I70.0: ICD-10-CM

## 2021-09-16 DIAGNOSIS — Z79.01: ICD-10-CM

## 2021-09-16 DIAGNOSIS — E78.2: ICD-10-CM

## 2021-09-16 DIAGNOSIS — I48.3: ICD-10-CM

## 2021-09-16 DIAGNOSIS — Z79.4: ICD-10-CM

## 2021-09-16 DIAGNOSIS — Z90.49: ICD-10-CM

## 2021-09-16 DIAGNOSIS — I48.91: Primary | ICD-10-CM

## 2021-09-16 DIAGNOSIS — I25.10: ICD-10-CM

## 2021-09-16 DIAGNOSIS — Z98.890: ICD-10-CM

## 2021-09-16 DIAGNOSIS — E11.9: ICD-10-CM

## 2021-09-16 DIAGNOSIS — Z85.51: ICD-10-CM

## 2021-09-16 DIAGNOSIS — Z79.82: ICD-10-CM

## 2021-09-16 DIAGNOSIS — I10: ICD-10-CM

## 2021-09-16 LAB
ANION GAP SERPL CALC-SCNC: 9 MMOL/L
BUN SERPL-SCNC: 20 MG/DL (ref 9–20)
CALCIUM SPEC-MCNC: 9.8 MG/DL (ref 8.4–10.2)
CHLORIDE SERPL-SCNC: 111 MMOL/L (ref 98–107)
CO2 SERPL-SCNC: 23 MMOL/L (ref 22–30)
GLUCOSE BLD-MCNC: 164 MG/DL (ref 75–99)
GLUCOSE SERPL-MCNC: 161 MG/DL (ref 74–99)
POTASSIUM SERPL-SCNC: 4.4 MMOL/L (ref 3.5–5.1)
SODIUM SERPL-SCNC: 143 MMOL/L (ref 137–145)

## 2021-09-16 PROCEDURE — 93005 ELECTROCARDIOGRAM TRACING: CPT

## 2021-09-16 PROCEDURE — 93312 ECHO TRANSESOPHAGEAL: CPT

## 2021-09-16 PROCEDURE — 93325 DOPPLER ECHO COLOR FLOW MAPG: CPT

## 2021-09-16 PROCEDURE — 93320 DOPPLER ECHO COMPLETE: CPT

## 2021-09-16 PROCEDURE — 80048 BASIC METABOLIC PNL TOTAL CA: CPT

## 2021-09-16 PROCEDURE — 92960 CARDIOVERSION ELECTRIC EXT: CPT

## 2021-09-16 NOTE — ECHOT
TRANSESOPHAGEAL ECHOCARDIOGRAM



INDICATION:

Evaluation left atrial appendage.



After explaining the procedure to the patient as well as its risks and the

complications, blood pressure, heart rate, O2 saturation was monitored.  The throat was

sprayed with Cetacaine.  Please see the sedation per Anesthesia Department.  The probe

was introduced esophagus without difficulty.  Images were obtained.  Following that,

the probe was removed there was no immediate complication.



FINDINGS:

Left atrial size is mildly dilated.  Left atrial appendage is normal.  Left ventricular

size and systolic function normal. The aortic valve revealed mild fibrocalcific changes

with aortic cusp with preserved opening. Mitral valve appears to be normal. Descending

thoracic aorta showed mild atherosclerotic changes.  No pericardial effusion was noted.

The interatrial septum was highly mobile and there was minimal late shunting with

contrast bubble study.



DOPPLER:



Pulse wave and color Doppler obtained revealed mild mitral and tricuspid regurgitation.

There was no shunting by color Doppler study.



CONCLUSION:

1. Mildly dilated left atrium with normal appearance left atrial appendage.

2. Normal left ventricular size and systolic function.

3. Aortic sclerosis with no evident stenosis.

4. Mild mitral and tricuspid regurgitation.

5. A late shunting across the interatrial septum.

6. Mild atherosclerotic change of the descending thoracic aorta.

7. No pericardial effusion.





MMODL / IJN: [id number] / Job#: [id number]

## 2021-09-16 NOTE — PCN
PROCEDURE NOTE



INDICATION:

Atrial fibrillation.



PROCEDURE DESCRIPTION:

After explaining the procedure to the patient, its risks and complications, his blood

pressure, heart rate and O2 saturations were monitored.  After obtaining

transesophageal echocardiogram and obtaining sedated state per Anesthesia Department, a

synchronized biphasic cardioversion using 75 joules was performed, with restoration of

normal sinus rhythm.  There was no immediate complication.





WOODY / MICKI: [id number] / Job#: [id number]

## 2021-11-13 ENCOUNTER — HOSPITAL ENCOUNTER (EMERGENCY)
Dept: HOSPITAL 47 - EC | Age: 81
Discharge: HOME | End: 2021-11-13
Payer: MEDICARE

## 2021-11-13 VITALS
TEMPERATURE: 98.1 F | HEART RATE: 90 BPM | DIASTOLIC BLOOD PRESSURE: 94 MMHG | RESPIRATION RATE: 18 BRPM | SYSTOLIC BLOOD PRESSURE: 141 MMHG

## 2021-11-13 DIAGNOSIS — Z79.82: ICD-10-CM

## 2021-11-13 DIAGNOSIS — Z79.4: ICD-10-CM

## 2021-11-13 DIAGNOSIS — D72.829: ICD-10-CM

## 2021-11-13 DIAGNOSIS — N39.0: Primary | ICD-10-CM

## 2021-11-13 DIAGNOSIS — I10: ICD-10-CM

## 2021-11-13 DIAGNOSIS — Z79.899: ICD-10-CM

## 2021-11-13 DIAGNOSIS — Z87.891: ICD-10-CM

## 2021-11-13 DIAGNOSIS — E78.5: ICD-10-CM

## 2021-11-13 DIAGNOSIS — N40.0: ICD-10-CM

## 2021-11-13 DIAGNOSIS — I25.10: ICD-10-CM

## 2021-11-13 DIAGNOSIS — Z79.01: ICD-10-CM

## 2021-11-13 DIAGNOSIS — E11.9: ICD-10-CM

## 2021-11-13 LAB
PH UR: 5.5 [PH] (ref 5–8)
PROT UR QL: (no result)
RBC UR QL: >182 /HPF (ref 0–5)
SP GR UR: 1.02 (ref 1–1.03)
UROBILINOGEN UR QL STRIP: <2 MG/DL (ref ?–2)
WBC # UR AUTO: 63 /HPF (ref 0–5)

## 2021-11-13 PROCEDURE — 87086 URINE CULTURE/COLONY COUNT: CPT

## 2021-11-13 PROCEDURE — 81001 URINALYSIS AUTO W/SCOPE: CPT

## 2021-11-13 PROCEDURE — 99283 EMERGENCY DEPT VISIT LOW MDM: CPT

## 2021-11-13 NOTE — ED
General Adult HPI





- General


Chief complaint: Urogenital


Stated complaint: Blood in Urine


Time Seen by Provider: 11/13/21 18:28


Source: patient, RN notes reviewed, old records reviewed


Mode of arrival: ambulatory


Limitations: no limitations





- History of Present Illness


Initial comments: 





81-year-old male presenting with hematuria.  Patient states that about 6 about 

one week ago he had some bright red blood in his urine.  He had stopped his 

eliquis and he states that this completely resolved and he had normal clear 

yellow urine.  No dysuria at that time.  Again this evening he developed bright 

red hematuria.  He does have history of bladder cancer and is following with 

urology.  He denies fever.  Denies flank pain.  Denies abdominal pain.





- Related Data


                                Home Medications











 Medication  Instructions  Recorded  Confirmed


 


Insulin Glargine,Hum.rec.anlog 65 unit SQ HS 12/22/14 09/16/21





[Lantus Solostar Pen]   


 


Rosuvastatin Calcium [Crestor] 10 mg PO DAILY 12/22/14 09/16/21


 


allopurinoL [Zyloprim] 100 mg PO DAILY 12/22/14 09/16/21


 


sitaGLIPtin PHOS/metFORMIN HCL 1 tab PO BID 12/22/14 09/16/21





[Janumet 50-1,000 mg Tablet]   


 


Aspirin EC [Ecotrin Low Dose] 81 mg PO DAILY 06/01/16 09/16/21


 


Insulin Lispro [humaLOG Kwikpen] 10 unit SQ AC-TID 08/04/21 09/16/21


 


Apixaban [Eliquis] 5 mg PO BID 09/13/21 09/16/21








                                  Previous Rx's











 Medication  Instructions  Recorded


 


lisinopriL [Zestril] 2.5 mg PO DAILY  tab 10/07/18


 


Isosorbide Mononitrate ER [Imdur] 30 mg PO DAILY #30 tab.er.24h 08/05/21


 


Cephalexin [Keflex] 500 mg PO TID 10 Days #30 cap 11/13/21











                                    Allergies











Allergy/AdvReac Type Severity Reaction Status Date / Time


 


No Known Allergies Allergy   Verified 11/13/21 18:25














Review of Systems


ROS Statement: 


Those systems with pertinent positive or pertinent negative responses have been 

documented in the HPI.





ROS Other: All systems not noted in ROS Statement are negative.





Past Medical History


Past Medical History: Atrial Flutter, Coronary Artery Disease (CAD), Cancer, 

Chest Pain / Angina, Diabetes Mellitus, Hearing Disorder / Deafness, 

Hyperlipidemia, Hypertension, Prostate Disorder, Renal Disease


Additional Past Medical History / Comment(s): BPH, bladder cancer 5/1/21, 

hearing aids


History of Any Multi-Drug Resistant Organisms: None Reported


Past Surgical History: Appendectomy, Bladder Surgery, Heart Catheterization, 

Joint Replacement, Tonsillectomy


Additional Past Surgical History / Comment(s): left knee replaced, right rotator

 cuff SX, COLONOSCOPY,


Past Anesthesia/Blood Transfusion Reactions: No Reported Reaction


Past Psychological History: Depression


Smoking Status: Former smoker


Past Alcohol Use History: Occasional


Past Drug Use History: None Reported





- Past Family History


  ** Mother


Family Medical History: No Reported History


Additional Family Medical History / Comment(s): Mother had history of congenital

 heart disease, CHF.





  ** Father


Family Medical History: CVA/TIA


Additional Family Medical History / Comment(s): Father contracted malaria.  

History of CVA.





  ** Brother(s)


Family Medical History: No Reported History


Additional Family Medical History / Comment(s): Brother has history of 

headaches.





  ** Sister(s)


Family Medical History: No Reported History





  ** Daughter(s)


Family Medical History: No Reported History


Additional Family Medical History / Comment(s): Patient has one daughter with no

 major medical problems.





  ** Son(s)


Family Medical History: No Reported History


Additional Family Medical History / Comment(s): Patient has one son with no 

major medical problems.





General Exam


Limitations: no limitations


General appearance: alert, in no apparent distress


Head exam: Present: atraumatic, normocephalic


Eye exam: Present: normal appearance, PERRL


ENT exam: Present: normal exam


Neck exam: Present: normal inspection.  Absent: tenderness, meningismus


Respiratory exam: Present: normal lung sounds bilaterally.  Absent: respiratory 

distress, wheezes


Cardiovascular Exam: Present: regular rate, normal rhythm


GI/Abdominal exam: Present: soft.  Absent: distended, tenderness, guarding


Extremities exam: Present: normal inspection, normal capillary refill


Neurological exam: Present: alert, oriented X3, CN II-XII intact.  Absent: motor

 sensory deficit


Psychiatric exam: Present: normal affect, normal mood


Skin exam: Present: warm, dry, intact.  Absent: cyanosis, diaphoretic





Course


                                   Vital Signs











  11/13/21





  18:20


 


Temperature 98.4 F


 


Pulse Rate 98


 


Respiratory 19





Rate 


 


Blood Pressure 124/70


 


O2 Sat by Pulse 99





Oximetry 














Medical Decision Making





- Medical Decision Making





81-year-old male with hematuria, history of bladder cancer, on eliquis.  Urine 

is red, transparent.  Patient is able to urinate on command without difficulty. 

 Urinalysis shows moderate bacteria, elevated white blood cells, positive 

leukocyte Estrace.  Urine culture will be performed.  Patient is started on 

antibiotics.  He should follow-up with urology.  He should return he cannot 

urinate, develops fever or worsening symptoms.





- Lab Data


                                   Lab Results











  11/13/21 Range/Units





  18:42 


 


Urine Color  Dark Brown  


 


Urine Appearance  Turbid  (Clear)  


 


Urine pH  5.5  (5.0-8.0)  


 


Ur Specific Gravity  1.022  (1.001-1.035)  


 


Urine Protein  2+ H  (Negative)  


 


Urine Glucose (UA)  Trace H  (Negative)  


 


Urine Ketones  Trace H  (Negative)  


 


Urine Blood  Large H  (Negative)  


 


Urine Nitrite  Negative  (Negative)  


 


Urine Bilirubin  Negative  (Negative)  


 


Urine Urobilinogen  <2.0  (<2.0)  mg/dL


 


Ur Leukocyte Esterase  Moderate H  (Negative)  


 


Urine RBC  >182 H  (0-5)  /hpf


 


Urine WBC  63 H  (0-5)  /hpf


 


Urine Bacteria  Moderate H  (None)  /hpf


 


Urine Yeast (Budding)  Many H  (None)  /hpf














Disposition


Clinical Impression: 


 Gross hematuria, Urinary tract infection





Disposition: HOME SELF-CARE


Condition: Good


Instructions (If sedation given, give patient instructions):  Urinary Tract 

Infection in Men (ED), Hematuria (ED)


Prescriptions: 


Cephalexin [Keflex] 500 mg PO TID 10 Days #30 cap


Is patient prescribed a controlled substance at d/c from ED?: No


Referrals: 


Maco Christiansen MD [Primary Care Provider] - 1-2 days


Colby Chris MD [STAFF PHYSICIAN] - 1-2 days


Time of Disposition: 19:24

## 2022-03-25 ENCOUNTER — HOSPITAL ENCOUNTER (OUTPATIENT)
Dept: HOSPITAL 47 - LABWHC1 | Age: 82
Discharge: HOME | End: 2022-03-25
Attending: UROLOGY
Payer: MEDICARE

## 2022-03-25 DIAGNOSIS — Z53.9: Primary | ICD-10-CM

## 2022-04-04 ENCOUNTER — HOSPITAL ENCOUNTER (EMERGENCY)
Dept: HOSPITAL 47 - EC | Age: 82
Discharge: HOME | End: 2022-04-04
Payer: MEDICARE

## 2022-04-04 VITALS
DIASTOLIC BLOOD PRESSURE: 76 MMHG | RESPIRATION RATE: 18 BRPM | TEMPERATURE: 97 F | HEART RATE: 94 BPM | SYSTOLIC BLOOD PRESSURE: 147 MMHG

## 2022-04-04 DIAGNOSIS — E11.9: ICD-10-CM

## 2022-04-04 DIAGNOSIS — Z87.891: ICD-10-CM

## 2022-04-04 DIAGNOSIS — I10: ICD-10-CM

## 2022-04-04 DIAGNOSIS — H91.90: ICD-10-CM

## 2022-04-04 DIAGNOSIS — I25.10: ICD-10-CM

## 2022-04-04 DIAGNOSIS — R31.9: Primary | ICD-10-CM

## 2022-04-04 DIAGNOSIS — Z79.82: ICD-10-CM

## 2022-04-04 LAB
BASOPHILS # BLD AUTO: 0 K/UL (ref 0–0.2)
BASOPHILS NFR BLD AUTO: 1 %
EOSINOPHIL # BLD AUTO: 0.2 K/UL (ref 0–0.7)
EOSINOPHIL NFR BLD AUTO: 4 %
ERYTHROCYTE [DISTWIDTH] IN BLOOD BY AUTOMATED COUNT: 4.72 M/UL (ref 4.3–5.9)
ERYTHROCYTE [DISTWIDTH] IN BLOOD: 16.7 % (ref 11.5–15.5)
HCT VFR BLD AUTO: 34.8 % (ref 39–53)
HGB BLD-MCNC: 10.4 GM/DL (ref 13–17.5)
LYMPHOCYTES # SPEC AUTO: 1.6 K/UL (ref 1–4.8)
LYMPHOCYTES NFR SPEC AUTO: 26 %
MCH RBC QN AUTO: 22.1 PG (ref 25–35)
MCHC RBC AUTO-ENTMCNC: 29.9 G/DL (ref 31–37)
MCV RBC AUTO: 73.8 FL (ref 80–100)
MONOCYTES # BLD AUTO: 0.4 K/UL (ref 0–1)
MONOCYTES NFR BLD AUTO: 6 %
NEUTROPHILS # BLD AUTO: 3.8 K/UL (ref 1.3–7.7)
NEUTROPHILS NFR BLD AUTO: 61 %
PLATELET # BLD AUTO: 151 K/UL (ref 150–450)
RBC UR QL: >182 /HPF (ref 0–5)
WBC # BLD AUTO: 6.2 K/UL (ref 3.8–10.6)
WBC # UR AUTO: 36 /HPF (ref 0–5)

## 2022-04-04 PROCEDURE — 85025 COMPLETE CBC W/AUTO DIFF WBC: CPT

## 2022-04-04 PROCEDURE — 87086 URINE CULTURE/COLONY COUNT: CPT

## 2022-04-04 PROCEDURE — 81001 URINALYSIS AUTO W/SCOPE: CPT

## 2022-04-04 PROCEDURE — 36415 COLL VENOUS BLD VENIPUNCTURE: CPT

## 2022-04-04 PROCEDURE — 99283 EMERGENCY DEPT VISIT LOW MDM: CPT

## 2022-05-30 ENCOUNTER — HOSPITAL ENCOUNTER (OUTPATIENT)
Dept: HOSPITAL 47 - EC | Age: 82
Setting detail: OBSERVATION
LOS: 1 days | Discharge: HOME | End: 2022-05-31
Attending: INTERNAL MEDICINE | Admitting: INTERNAL MEDICINE
Payer: MEDICARE

## 2022-05-30 DIAGNOSIS — I25.10: ICD-10-CM

## 2022-05-30 DIAGNOSIS — I48.92: ICD-10-CM

## 2022-05-30 DIAGNOSIS — Z82.49: ICD-10-CM

## 2022-05-30 DIAGNOSIS — Z92.21: ICD-10-CM

## 2022-05-30 DIAGNOSIS — Z79.84: ICD-10-CM

## 2022-05-30 DIAGNOSIS — Z79.82: ICD-10-CM

## 2022-05-30 DIAGNOSIS — D64.9: ICD-10-CM

## 2022-05-30 DIAGNOSIS — F41.1: ICD-10-CM

## 2022-05-30 DIAGNOSIS — Z79.811: ICD-10-CM

## 2022-05-30 DIAGNOSIS — F32.A: ICD-10-CM

## 2022-05-30 DIAGNOSIS — I50.30: ICD-10-CM

## 2022-05-30 DIAGNOSIS — E11.9: ICD-10-CM

## 2022-05-30 DIAGNOSIS — I48.0: ICD-10-CM

## 2022-05-30 DIAGNOSIS — H91.90: ICD-10-CM

## 2022-05-30 DIAGNOSIS — N40.0: ICD-10-CM

## 2022-05-30 DIAGNOSIS — Z83.1: ICD-10-CM

## 2022-05-30 DIAGNOSIS — E78.5: ICD-10-CM

## 2022-05-30 DIAGNOSIS — Z91.81: ICD-10-CM

## 2022-05-30 DIAGNOSIS — E88.81: ICD-10-CM

## 2022-05-30 DIAGNOSIS — Z87.891: ICD-10-CM

## 2022-05-30 DIAGNOSIS — Z85.51: ICD-10-CM

## 2022-05-30 DIAGNOSIS — Z79.4: ICD-10-CM

## 2022-05-30 DIAGNOSIS — Z96.652: ICD-10-CM

## 2022-05-30 DIAGNOSIS — Z79.01: ICD-10-CM

## 2022-05-30 DIAGNOSIS — R06.09: Primary | ICD-10-CM

## 2022-05-30 DIAGNOSIS — I11.0: ICD-10-CM

## 2022-05-30 DIAGNOSIS — M1A.9XX0: ICD-10-CM

## 2022-05-30 DIAGNOSIS — Z82.3: ICD-10-CM

## 2022-05-30 DIAGNOSIS — Z79.899: ICD-10-CM

## 2022-05-30 LAB
ALBUMIN SERPL-MCNC: 3.5 G/DL (ref 3.5–5)
ALP SERPL-CCNC: 77 U/L (ref 38–126)
ALT SERPL-CCNC: 21 U/L (ref 4–49)
ANION GAP SERPL CALC-SCNC: 9 MMOL/L
APTT BLD: 24.8 SEC (ref 22–30)
AST SERPL-CCNC: 37 U/L (ref 17–59)
BASOPHILS # BLD AUTO: 0 K/UL (ref 0–0.2)
BASOPHILS NFR BLD AUTO: 0 %
BUN SERPL-SCNC: 33 MG/DL (ref 9–20)
CALCIUM SPEC-MCNC: 8.3 MG/DL (ref 8.4–10.2)
CHLORIDE SERPL-SCNC: 109 MMOL/L (ref 98–107)
CO2 SERPL-SCNC: 19 MMOL/L (ref 22–30)
EOSINOPHIL # BLD AUTO: 0.2 K/UL (ref 0–0.7)
EOSINOPHIL NFR BLD AUTO: 6 %
ERYTHROCYTE [DISTWIDTH] IN BLOOD BY AUTOMATED COUNT: 3.52 M/UL (ref 4.3–5.9)
ERYTHROCYTE [DISTWIDTH] IN BLOOD: 17.2 % (ref 11.5–15.5)
GLUCOSE BLD-MCNC: 233 MG/DL (ref 75–99)
GLUCOSE BLD-MCNC: 267 MG/DL (ref 75–99)
GLUCOSE BLD-MCNC: 289 MG/DL (ref 75–99)
GLUCOSE SERPL-MCNC: 224 MG/DL (ref 74–99)
HCT VFR BLD AUTO: 24.8 % (ref 39–53)
HGB BLD-MCNC: 7.1 GM/DL (ref 13–17.5)
INR PPP: 1.1 (ref ?–1.2)
LYMPHOCYTES # SPEC AUTO: 1.1 K/UL (ref 1–4.8)
LYMPHOCYTES NFR SPEC AUTO: 26 %
MAGNESIUM SPEC-SCNC: 1.8 MG/DL (ref 1.6–2.3)
MCH RBC QN AUTO: 20 PG (ref 25–35)
MCHC RBC AUTO-ENTMCNC: 28.4 G/DL (ref 31–37)
MCV RBC AUTO: 70.5 FL (ref 80–100)
MONOCYTES # BLD AUTO: 0.3 K/UL (ref 0–1)
MONOCYTES NFR BLD AUTO: 6 %
NEUTROPHILS # BLD AUTO: 2.4 K/UL (ref 1.3–7.7)
NEUTROPHILS NFR BLD AUTO: 59 %
PLATELET # BLD AUTO: 130 K/UL (ref 150–450)
POTASSIUM SERPL-SCNC: 4.6 MMOL/L (ref 3.5–5.1)
PROT SERPL-MCNC: 6 G/DL (ref 6.3–8.2)
PT BLD: 11.4 SEC (ref 9–12)
SODIUM SERPL-SCNC: 137 MMOL/L (ref 137–145)
WBC # BLD AUTO: 4.1 K/UL (ref 3.8–10.6)

## 2022-05-30 PROCEDURE — 85025 COMPLETE CBC W/AUTO DIFF WBC: CPT

## 2022-05-30 PROCEDURE — 36430 TRANSFUSION BLD/BLD COMPNT: CPT

## 2022-05-30 PROCEDURE — 93005 ELECTROCARDIOGRAM TRACING: CPT

## 2022-05-30 PROCEDURE — 86901 BLOOD TYPING SEROLOGIC RH(D): CPT

## 2022-05-30 PROCEDURE — 84100 ASSAY OF PHOSPHORUS: CPT

## 2022-05-30 PROCEDURE — 71046 X-RAY EXAM CHEST 2 VIEWS: CPT

## 2022-05-30 PROCEDURE — 96374 THER/PROPH/DIAG INJ IV PUSH: CPT

## 2022-05-30 PROCEDURE — 83735 ASSAY OF MAGNESIUM: CPT

## 2022-05-30 PROCEDURE — 93306 TTE W/DOPPLER COMPLETE: CPT

## 2022-05-30 PROCEDURE — 83605 ASSAY OF LACTIC ACID: CPT

## 2022-05-30 PROCEDURE — 85730 THROMBOPLASTIN TIME PARTIAL: CPT

## 2022-05-30 PROCEDURE — 84484 ASSAY OF TROPONIN QUANT: CPT

## 2022-05-30 PROCEDURE — 85610 PROTHROMBIN TIME: CPT

## 2022-05-30 PROCEDURE — 94640 AIRWAY INHALATION TREATMENT: CPT

## 2022-05-30 PROCEDURE — 86850 RBC ANTIBODY SCREEN: CPT

## 2022-05-30 PROCEDURE — 83880 ASSAY OF NATRIURETIC PEPTIDE: CPT

## 2022-05-30 PROCEDURE — 99285 EMERGENCY DEPT VISIT HI MDM: CPT

## 2022-05-30 PROCEDURE — 86900 BLOOD TYPING SEROLOGIC ABO: CPT

## 2022-05-30 PROCEDURE — 82728 ASSAY OF FERRITIN: CPT

## 2022-05-30 PROCEDURE — 85379 FIBRIN DEGRADATION QUANT: CPT

## 2022-05-30 PROCEDURE — 86920 COMPATIBILITY TEST SPIN: CPT

## 2022-05-30 PROCEDURE — 80053 COMPREHEN METABOLIC PANEL: CPT

## 2022-05-30 PROCEDURE — 36415 COLL VENOUS BLD VENIPUNCTURE: CPT

## 2022-05-30 RX ADMIN — INSULIN ASPART SCH UNIT: 100 INJECTION, SOLUTION INTRAVENOUS; SUBCUTANEOUS at 14:39

## 2022-05-30 RX ADMIN — ASPIRIN SCH: 325 TABLET ORAL at 12:04

## 2022-05-30 RX ADMIN — ATORVASTATIN CALCIUM SCH MG: 20 TABLET, FILM COATED ORAL at 10:23

## 2022-05-30 RX ADMIN — ASPIRIN 81 MG CHEWABLE TABLET SCH MG: 81 TABLET CHEWABLE at 10:22

## 2022-05-30 RX ADMIN — CEFAZOLIN SCH MLS/HR: 330 INJECTION, POWDER, FOR SOLUTION INTRAMUSCULAR; INTRAVENOUS at 06:48

## 2022-05-30 RX ADMIN — APIXABAN SCH MG: 5 TABLET, FILM COATED ORAL at 21:16

## 2022-05-30 RX ADMIN — INSULIN ASPART SCH UNIT: 100 INJECTION, SOLUTION INTRAVENOUS; SUBCUTANEOUS at 17:54

## 2022-05-30 RX ADMIN — APIXABAN SCH MG: 5 TABLET, FILM COATED ORAL at 10:22

## 2022-05-30 RX ADMIN — LINAGLIPTIN SCH MG: 5 TABLET, FILM COATED ORAL at 10:23

## 2022-05-30 RX ADMIN — TAMSULOSIN HYDROCHLORIDE SCH MG: 0.4 CAPSULE ORAL at 10:22

## 2022-05-30 NOTE — CA
Transthoracic Echo Report 

 Name: Porfirio Magaña 

 MRN:    [id number] 

 Age:    82     Gender:     M 

 

 :    1940 

 Exam Date:     2022 12:47 

 Exam Location: [facility] 

 Ht (in):     60     Wt (lb):     228 

 Ordering Physician:        Alejandra Myles 

 Attending/Referring Phys: 

 Technician         Mattie Ramirez RDCS 

 Procedure CPT: 

 Indications:       sob 

 

 Cardiac Hx: 

 Technical Quality:      Fair 

 Contrast 1:                                Total Dose (mL): 

 Contrast 2:    N/A                         Total Dose (mL): 

 

 MEASUREMENTS  (Male / Female) Normal Values 

 2D ECHO 

 LV Diastolic Diameter PLAX        4.4 cm                4.2 - 5.9 / 3.9 - 5.3 cm 

 LV Systolic Diameter PLAX         5.3 cm                 

 IVS Diastolic Thickness           1.5 cm                0.6 - 1.0 / 0.6 - 0.9 cm 

 LVPW Diastolic Thickness          2.0 cm                0.6 - 1.0 / 0.6 - 0.9 cm 

 LV Relative Wall Thickness        0.8                    

 RV Internal Dim ED PLAX           3.2 cm                 

 LA Systolic Diameter LX           4.5 cm                3.0 - 4.0 / 2.7 - 3.8 cm 

 LA Volume                         82.1 cm???              18 - 58 / 22 - 52 cm??? 

 

 M-MODE 

 Aortic Root Diameter MM           3.5 cm                 

 LA Systolic Diameter MM           4.5 cm                 

 LA Ao Ratio MM                    1.3                    

 MV E Point Septal Separation      0.7 cm                 

 AV Cusp Separation MM             2.1 cm                 

 

 DOPPLER 

 MV Area PHT                       3.8 cm???                

 Mitral E Point Velocity           75.6 cm/s              

 Mitral A Point Velocity           81.0 cm/s              

 Mitral E to A Ratio               0.9                    

 MV Deceleration Time              197.4 ms               

 MV E' Velocity                    5.9 cm/s               

 Mitral E to MV E' Ratio           12.8                   

 TR Peak Velocity                  204.1 cm/s             

 TR Peak Gradient                  16.7 mmHg              

 Right Ventricular Systolic Press  21.7 mmHg              

 

 

 FINDINGS 

 Left Ventricle 

 Left ventricular ejection fraction is estimated at 50-55% Moderately increased  

 septal wall thickness. 

 

 Right Ventricle 

 Normal right ventricular size and function. Right ventricular systolic pressure  

 within normal limits. 

 

 Right Atrium 

 Normal right atrial size. 

 

 Left Atrium 

 Mildly increased left atrial diameter. Severely increased left atrial volume.  

 Mildly increased left atrial area. 

 

 Mitral Valve 

 Structurally normal mitral valve. Mild mitral regurgitation. 

 

 Aortic Valve 

 Trileaflet aortic valve. 

 

 Tricuspid Valve 

 Structurally normal tricuspid valve. Mild tricuspid regurgitation. 

 

 Pulmonic Valve 

 Structurally normal pulmonic valve. 

 

 Pericardium 

 Normal pericardium. 

 

 Aorta 

 Normal size aortic root and proximal ascending aorta. 

 

 CONCLUSIONS 

 Left ventricular systolic function is normal 

 Mild mitral regurgitation 

 Mild tricuspid regurgitation 

 Previewed by:  

 Dr. Yohannes Barnes MD 

 (Electronically Signed) 

 Final Date:      30 May 2022 14:34

## 2022-05-30 NOTE — CONS
CONSULTATION



CHIEF COMPLAINT:

Shortness of breath.



This is an 82-year-old gentleman with history of falls, coronary artery disease, atrial

fibrillation, status post cardioversion, hypertension, diabetes and dyslipidemia who

presented to hospital with shortness of breath of several weeks' duration.  He has been

getting progressively more short of breath and developed leg edema.  He is in normal

sinus rhythm and had prior normal LV systolic function.  On this admission, he was

found to have a hemoglobin of 7.1.  His baseline hemoglobin from a month ago was around

10.4.  The patient actually has been gradually dropping his hemoglobin; in October of

last year his hemoglobin was 13.1. Symptoms could be related to the anemia. His

troponin is negative and BNP is normal.  Patient's clinical presentation is probably

related to the anemia, and he also has a component of diastolic heart failure.  There

is no evidence of myocardial ischemia and patient is not in atrial fibrillation.  Past

medical history is significant for coronary artery disease.  Patient had a cardiac

catheterization in 2018 that revealed heavily calcified coronaries and a significant

lesion in the circumflex coronary artery that could not be stented, and he is being

managed medically _____ atrial fibrillation, for which he underwent cardioversion.

History is also significant for hypertension, diabetes, dyslipidemia.



MEDICATIONS:

Medications at home include Janumet, Zestril, Zyloprim, Flomax, Crestor, insulin,

Jardiance, aspirin and Eliquis.



ALLERGIES:

NO KNOWN DRUG ALLERGIES.



FAMILY HISTORY:

Negative for premature coronary artery disease.



SOCIAL HISTORY:

Negative for current smoking, EtOH abuse or drug abuse.



REVIEW OF SYSTEMS:

HEENT is unremarkable.

CARDIAC: As described above.

RESPIRATORY: As described above.

GI: Negative.

GENITOURINARY: Negative.

ALLERGY/IMMUNOLOGY: Negative.

SKIN: Negative.

MUSCULOSKELETAL: Significant for arthritis.

PSYCHOSOCIAL: Negative.

DERMATOLOGY: Negative.

CONSTITUTIONAL: Negative.

ONCOLOGICAL: Negative.

CNS: Negative.



Rest of the system review is not relevant.



PHYSICAL EXAMINATION:

Afebrile. Heart rate is 75 beats per minute.  Blood pressure is 104/58, respiratory

rate 18. Oxygen saturation is 97% on room air. There is no jugular venous distention.

Carotid upstroke is normal.  There is no bruit. Chest exam reveals good air entry

bilaterally. Heart exam reveals first and second heart sounds.  No gallop.  No murmur.

No rub.  Abdomen is soft, nontender. Examination of extremities reveals bilateral 1+

pitting edema.



LAB:

Potassium _____. Creatinine is 1.  AST and ALT are within normal limits.  BNP is

normal.  Troponin is negative. EKG is consistent with ischemia. Hemoglobin is low.



ASSESSMENT:

1. Shortness of breath, probably multifactorial in origin, including anemia with a

    hemoglobin of 7.1, and acute-onset diastolic heart failure.

2. Coronary artery disease, status post unsuccessful angioplasty of circumflex

    coronary artery.

3. Paroxysmal atrial fibrillation.

4. Hypertension.

5. Dyslipidemia.



PLAN:

I will treat the patient with IV Lasix.  Continue the current medications, including

Eliquis and aspirin. If patient's stool guaiac comes back positive, please go ahead and

hold both of them. Continue the ACE inhibitor.  I will obtain a 2D echo to document his

LV function.





MMODL / IJN: [id number] / Job#: [id number]

## 2022-05-30 NOTE — P.HPIM
History of Present Illness


H&P Date: 22


Chief Complaint: Dyspnea on exertion/anemia





HISTORY OF PRESENT ILLNESS


This is an 82-year-old male patient of mine with past medical history of corona

ry artery disease, hypertension, hyperlipidemia, diabetes mellitus type 2, 

generalized anxiety disorder, bladder cancer under the care of Dr. Chris, 

chronic gout.  Patient states that he was seen by Dr. Chris in his office last 

Wednesday Patient is undergoing BCG last with installation in the bladder for 2 

hours and after that patient will void he was supposed to return to the office 

next Wednesday for another treatment however the patient became quite weak with 

generalized weakness and increased swelling in both lower extremities associated

with increased dyspnea on exertion, so he ended up coming to the emergency 

department at [facility] yesterday and he was found to have a hemo

globin of 7 central sequelae was admitted to the hospital for blood transfusion 

and evaluated the patient EKG did not show evidence of acute ST-T wave changes, 

his laboratory evaluation otherwise were within normal.  








REVIEW OF SYSTEMS


Constitutional: No fever, no chills, no night sweats.  No weight change.  

Generalized weakness,no fatigue or lethargy.  No daytime sleepiness.


HEENT: No headache.  No blurred vision or double vision, no loss of vision.    

hard of Hearing, no ringing in the ears, no dizziness.  No nasal drainage or 

congestion.  No epistaxis.  No sore throat.


Lungs: Positive for  shortness of breath,no cough, no sputum production.  No 

wheezing.


Cardiovascular: Reports no chest pain, positive for lower extremity edema.  No 

palpitations.  No paroxysmal nocturnal dyspnea.  No orthopnea.  No 

lightheadedness or dizziness.  No syncopal episodes.  


Abdominal: No abdominal pain.  No nausea, vomiting.  No diarrhea.  No 

constipation.  No bloody or tarry stools..  No loss of appetite.


Genitourinary: No dysuria, increased frequency, urgency.  No urinary retention.


Musculoskeletal: No myalgias.  No muscle weakness, no gait dysfunction, no 

frequent falls.  No back pain.  No neck pain.


Integumentary: No wounds, no lesions.  No rash or pruritus.  No unusual 

bruising.  No change in hair or nails.


Neurologic: No aphasia. No facial droop. No change in mentation. No head injury.

No headache. No paralysis. No paresthesia.


Psychiatric: No depression.  No anxiety.  No mood swings.


Endocrine: No abnormal blood sugars.  No weight change.   





MEDICAL HISTORY


Coronary artery disease


Hypertension


Hyperlipidemia


Diabetes mellitus type 2


Generalized anxiety disorder


Bladder cancer


Chronic gout





SURGICAL HISTORY


Left total knee arthroplasty


Right shoulder rotator cuff repair


Bladder surgery


Appendectomy


Tonsillectomy


Colonoscopy and EGD .





SOCIAL HISTORY


Patient has remote history of tobacco use, no alcohol use, marijuana or illicit 

drug use.





FAMILY HISTORY


Father  from a stroke with history of malaria at 27 years of age.  Mother 

 at age 83 from heart failure.  Patient has one half brother that  at 

age 56 from Covid and MI.  Patient has one daughter with no major medical 

problems and 2 sons with no major medical problems.





PHYSICAL EXAMINATION


Gen: This is an 82-year-old male patient.  He is resting in bed appears to be 

comfortable and in no acute distress.


HEENT: Head is atraumatic, normocephalic. Pupils equal, round. Sclerae is 

anicteric, conjunctiva were pale mucous membranes of the mouth are somewhat dry.




NECK: Supple. No JVD. No lymphadenopathy. No thyromegaly. 


LUNGS:  decreased breath sound at bases, few rhonchi, no expiratory wheezes, no 

chest wall tenderness, no intercostal retractions.


HEART: First heart sound is depressed, second heart sound is normal, 2/6 

systolic ejection murmur at the left sternal border.  No S3, no S4. 


ABDOMEN: Soft. Bowel sounds are present. No masses.  No tenderness.


EXTREMITIES:  +2 edema no calf tenderness, dorsalis pedis +2 bilaterally.


NEUROLOGICAL: Patient is awake, alert and oriented x3. Cranial nerves 2 through 

12 are grossly intact, muscle power 4 out of 5 in upper and lower extremities 

bilaterally. 


ASSESSMENT AND PLAN








1.  Dyspnea on exertion likely related to low hemoglobin.  Type and cross and 

transfuse 1 unit of packed red blood cells, monitor the patient hemoglobin of 

the hemoglobin is stable


4 hours he can be discharged home.  Echocardiogram will be repeated for 

evaluation of LV function.  Last echocardiogram was done in 2018.  





2.  Hypertension and hypertensive cardiovascular disease.  Continue lisinopril 

2.5 mg once every day, continue metoprolol 25 mg orally twice every day.   





3.  Hyperlipidemia.  Continue Rosuvastatin 10 mg orally daily





4.  Diabetes mellitus type 2.  Continue Levemir 65 units at bedtime, NovoLog 

scale 12 units with meals, continue patient on Jardiamce 10 mg orally once every

day, discussed with the patient the need to add GLP-1 receptor agonist such as 

Ozempic 0.25 mg subcutaneously once every week to try to help postprandial 

hyperglycemia and his insulin resistance.,  After that we can reduce the dose of

insulin. 





5.  paroxysmal atrial fibrillation.  Continue patient on Eliquis  5 mg orally 

twice every day,, continue patient on metoprolol 25 mg orally twice every day.





6.  History of bladder cancer under the care Dr Chris status post BCG 

treatment, last one was on was on Wednesday the next one is this coming 

Wednesday.   





7.  Chronic gout.  Continue allopurinol 100 mg daily





8.  GI prophylaxis.  Protonix 40 mg orally once every day.  





9.  DVT prophylaxis.  continue with bilateral knee-high TERA hose, continue 

Eliquis 5 mg po bid





10.  Observation.





11.  Full code.





Past Medical History


Past Medical History: Atrial Flutter, Coronary Artery Disease (CAD), Cancer, 

Chest Pain / Angina, Diabetes Mellitus, Hearing Disorder / Deafness, 

Hyperlipidemia, Hypertension, Prostate Disorder, Renal Disease


Additional Past Medical History / Comment(s): BPH, bladder cancer 19, 

hearing aids


History of Any Multi-Drug Resistant Organisms: None Reported


Past Surgical History: Appendectomy, Bladder Surgery, Heart Catheterization, 

Joint Replacement, Tonsillectomy


Additional Past Surgical History / Comment(s): left knee replaced, right rotator

cuff SX, COLONOSCOPY,


Past Anesthesia/Blood Transfusion Reactions: No Reported Reaction


Past Psychological History: Depression


Smoking Status: Former smoker


Past Alcohol Use History: Occasional


Past Drug Use History: None Reported





- Past Family History


  ** Mother


Family Medical History: No Reported History


Additional Family Medical History / Comment(s): Mother had history of congenital

heart disease, CHF.





  ** Father


Family Medical History: CVA/TIA


Additional Family Medical History / Comment(s): Father contracted malaria.  H

istory of CVA.





  ** Brother(s)


Family Medical History: No Reported History


Additional Family Medical History / Comment(s): Brother has history of hea

daches.





  ** Sister(s)


Family Medical History: No Reported History





  ** Daughter(s)


Family Medical History: No Reported History


Additional Family Medical History / Comment(s): Patient has one daughter with no

major medical problems.





  ** Son(s)


Family Medical History: No Reported History


Additional Family Medical History / Comment(s): Patient has one son with no 

major medical problems.





Medications and Allergies


                                Home Medications











 Medication  Instructions  Recorded  Confirmed  Type


 


Insulin Glargine,Hum.rec.anlog 65 unit SQ HS 14 History





[Lantus Solostar Pen]    


 


Rosuvastatin Calcium [Crestor] 10 mg PO DAILY 14 History


 


allopurinoL [Zyloprim] 100 mg PO DAILY 14 History


 


sitaGLIPtin PHOS/metFORMIN HCL 1 tab PO DAILY 14 History





[Janumet 50-1,000 mg Tablet]    


 


Aspirin EC [Ecotrin Low Dose] 81 mg PO DAILY 16 History


 


lisinopriL [Zestril] 2.5 mg PO DAILY  tab 10/07/18 05/30/22 Rx


 


Insulin Lispro [humaLOG Kwikpen] 10 unit SQ AC-TID 21 History


 


Apixaban [Eliquis] 5 mg PO BID 21 History


 


Empagliflozin [Jardiance] 10 mg PO DAILY 22 History


 


Tamsulosin [Flomax] 0.4 mg PO DAILY 22 History








                                    Allergies











Allergy/AdvReac Type Severity Reaction Status Date / Time


 


No Known Allergies Allergy   Verified 22 07:34














Physical Exam


Vitals: 


                                   Vital Signs











  Temp Pulse Resp BP Pulse Ox


 


 22 06:50   75  18  104/58  97


 


 22 05:38   68   


 


 22 05:30  98.2 F   20  


 


 22 05:05   78  16  104/58  98








                                Intake and Output











 22





 22:59 06:59 14:59


 


Other:   


 


  Weight  103.419 kg 














Results


CBC & Chem 7: 


                                 22 05:23





                                 22 05:23


Labs: 


                  Abnormal Lab Results - Last 24 Hours (Table)











  22 Range/Units





  05:23 05:23 05:23 


 


RBC  3.52 L    (4.30-5.90)  m/uL


 


Hgb  7.1 L D    (13.0-17.5)  gm/dL


 


Hct  24.8 L    (39.0-53.0)  %


 


MCV  70.5 L    (80.0-100.0)  fL


 


MCH  20.0 L    (25.0-35.0)  pg


 


MCHC  28.4 L    (31.0-37.0)  g/dL


 


RDW  17.2 H    (11.5-15.5)  %


 


Plt Count  130 L    (150-450)  k/uL


 


Chloride   109 H   ()  mmol/L


 


Carbon Dioxide   19 L   (22-30)  mmol/L


 


BUN   33 H   (9-20)  mg/dL


 


Glucose   224 H   (74-99)  mg/dL


 


Plasma Lactic Acid Owen    2.2 H*  (0.7-2.0)  mmol/L


 


Calcium   8.3 L   (8.4-10.2)  mg/dL


 


Total Protein   6.0 L   (6.3-8.2)  g/dL


 


Crossmatch     














  22 Range/Units





  06:45 


 


RBC   (4.30-5.90)  m/uL


 


Hgb   (13.0-17.5)  gm/dL


 


Hct   (39.0-53.0)  %


 


MCV   (80.0-100.0)  fL


 


MCH   (25.0-35.0)  pg


 


MCHC   (31.0-37.0)  g/dL


 


RDW   (11.5-15.5)  %


 


Plt Count   (150-450)  k/uL


 


Chloride   ()  mmol/L


 


Carbon Dioxide   (22-30)  mmol/L


 


BUN   (9-20)  mg/dL


 


Glucose   (74-99)  mg/dL


 


Plasma Lactic Acid Owen   (0.7-2.0)  mmol/L


 


Calcium   (8.4-10.2)  mg/dL


 


Total Protein   (6.3-8.2)  g/dL


 


Crossmatch  See Detail

## 2022-05-30 NOTE — ED
SOB HPI





- General


Chief Complaint: Shortness of Breath


Stated Complaint: FELICITAS


Time Seen by Provider: 05/30/22 05:09


Source: patient


Mode of arrival: wheelchair


Limitations: no limitations





- Related Data


                                Home Medications











 Medication  Instructions  Recorded  Confirmed


 


Insulin Glargine,Hum.rec.anlog 65 unit SQ HS 12/22/14 04/04/22





[Lantus Solostar Pen]   


 


Rosuvastatin Calcium [Crestor] 10 mg PO DAILY 12/22/14 04/04/22


 


allopurinoL [Zyloprim] 100 mg PO DAILY 12/22/14 04/04/22


 


sitaGLIPtin PHOS/metFORMIN HCL 1 tab PO BID 12/22/14 04/04/22





[Janumet 50-1,000 mg Tablet]   


 


Aspirin EC [Ecotrin Low Dose] 81 mg PO DAILY 06/01/16 04/04/22


 


Insulin Lispro [humaLOG Kwikpen] 12 unit SQ AC-TID 08/04/21 04/04/22


 


Apixaban [Eliquis] 5 mg PO BID 09/13/21 04/04/22


 


Canagliflozin [Invokana] 100 mg PO DAILY 04/04/22 04/04/22








                                  Previous Rx's











 Medication  Instructions  Recorded


 


lisinopriL [Zestril] 2.5 mg PO DAILY  tab 10/07/18











                                    Allergies











Allergy/AdvReac Type Severity Reaction Status Date / Time


 


No Known Allergies Allergy   Verified 05/30/22 05:05














Review of Systems


ROS Statement: 


Those systems with pertinent positive or pertinent negative responses have been 

documented in the HPI.





ROS Other: All systems not noted in ROS Statement are negative.





Past Medical History


Past Medical History: Atrial Flutter, Coronary Artery Disease (CAD), Cancer, 

Chest Pain / Angina, Diabetes Mellitus, Hearing Disorder / Deafness, 

Hyperlipidemia, Hypertension, Prostate Disorder, Renal Disease


Additional Past Medical History / Comment(s): BPH, bladder cancer 5/1/21, 

hearing aids


History of Any Multi-Drug Resistant Organisms: None Reported


Past Surgical History: Appendectomy, Bladder Surgery, Heart Catheterization, 

Joint Replacement, Tonsillectomy


Additional Past Surgical History / Comment(s): left knee replaced, right rotator

 cuff SX, COLONOSCOPY,


Past Anesthesia/Blood Transfusion Reactions: No Reported Reaction


Past Psychological History: Depression


Smoking Status: Former smoker


Past Alcohol Use History: Occasional


Past Drug Use History: None Reported





- Past Family History


  ** Mother


Family Medical History: No Reported History


Additional Family Medical History / Comment(s): Mother had history of congenital

 heart disease, CHF.





  ** Father


Family Medical History: CVA/TIA


Additional Family Medical History / Comment(s): Father contracted malaria.  

History of CVA.





  ** Brother(s)


Family Medical History: No Reported History


Additional Family Medical History / Comment(s): Brother has history of 

headaches.





  ** Sister(s)


Family Medical History: No Reported History





  ** Daughter(s)


Family Medical History: No Reported History


Additional Family Medical History / Comment(s): Patient has one daughter with no

 major medical problems.





  ** Son(s)


Family Medical History: No Reported History


Additional Family Medical History / Comment(s): Patient has one son with no 

major medical problems.





General Exam


Limitations: no limitations





Course


                                   Vital Signs











  05/30/22





  05:05


 


Pulse Rate 78


 


Respiratory 16





Rate 


 


Blood Pressure 104/58


 


O2 Sat by Pulse 98





Oximetry 














Medical Decision Making





- EKG Data


-: EKG Interpreted by Me (EKG is sinus rhythm 70  QRS 99 QTc 409)





Disposition


Referrals: 


Maco Christiansen MD [Primary Care Provider] - 1-2 days

## 2022-05-31 VITALS
RESPIRATION RATE: 16 BRPM | TEMPERATURE: 97.3 F | DIASTOLIC BLOOD PRESSURE: 63 MMHG | HEART RATE: 78 BPM | SYSTOLIC BLOOD PRESSURE: 116 MMHG

## 2022-05-31 LAB
ALBUMIN SERPL-MCNC: 4.2 G/DL (ref 3.8–4.9)
ALBUMIN/GLOB SERPL: 1.83 G/DL (ref 1.6–3.17)
ALP SERPL-CCNC: 81 U/L (ref 41–126)
ALT SERPL-CCNC: 23 U/L (ref 10–49)
ANION GAP SERPL CALC-SCNC: 10.6 MMOL/L (ref 10–18)
AST SERPL-CCNC: 34 U/L (ref 14–35)
BASOPHILS # BLD AUTO: 0.03 X 10*3/UL (ref 0–0.1)
BASOPHILS NFR BLD AUTO: 0.6 %
BUN SERPL-SCNC: 26 MG/DL (ref 9–27)
BUN/CREAT SERPL: 26 RATIO (ref 12–20)
CALCIUM SPEC-MCNC: 9.3 MG/DL (ref 8.7–10.3)
CHLORIDE SERPL-SCNC: 110 MMOL/L (ref 96–109)
CO2 SERPL-SCNC: 21.4 MMOL/L (ref 20–27.5)
EOSINOPHIL # BLD AUTO: 0.27 X 10*3/UL (ref 0.04–0.35)
EOSINOPHIL NFR BLD AUTO: 5.6 %
ERYTHROCYTE [DISTWIDTH] IN BLOOD BY AUTOMATED COUNT: 4.23 X 10*6/UL (ref 4.4–5.6)
ERYTHROCYTE [DISTWIDTH] IN BLOOD: 19.4 % (ref 11.5–14.5)
GLOBULIN SER CALC-MCNC: 2.3 G/DL (ref 1.6–3.3)
GLUCOSE BLD-MCNC: 136 MG/DL (ref 75–99)
GLUCOSE BLD-MCNC: 242 MG/DL (ref 75–99)
GLUCOSE SERPL-MCNC: 124 MG/DL (ref 70–110)
HCT VFR BLD AUTO: 30.5 % (ref 39.6–50)
HGB BLD-MCNC: 8.4 G/DL (ref 13–17)
IMM GRANULOCYTES BLD QL AUTO: 0.2 %
LYMPHOCYTES # SPEC AUTO: 1.13 X 10*3/UL (ref 0.9–5)
LYMPHOCYTES NFR SPEC AUTO: 23.4 %
MAGNESIUM SPEC-SCNC: 2 MG/DL (ref 1.5–2.4)
MCH RBC QN AUTO: 19.9 PG (ref 27–32)
MCHC RBC AUTO-ENTMCNC: 27.5 G/DL (ref 32–37)
MCV RBC AUTO: 72.1 FL (ref 80–97)
MONOCYTES # BLD AUTO: 0.42 X 10*3/UL (ref 0.2–1)
MONOCYTES NFR BLD AUTO: 8.7 %
NEUTROPHILS # BLD AUTO: 2.96 X 10*3/UL (ref 1.8–7.7)
NEUTROPHILS NFR BLD AUTO: 61.5 %
NRBC BLD AUTO-RTO: 0 /100 WBCS (ref 0–0)
PLATELET # BLD AUTO: 150 X 10*3/UL (ref 140–440)
POTASSIUM SERPL-SCNC: 4.5 MMOL/L (ref 3.5–5.5)
PROT SERPL-MCNC: 6.5 G/DL (ref 6.2–8.2)
SODIUM SERPL-SCNC: 142 MMOL/L (ref 135–145)
WBC # BLD AUTO: 4.82 X 10*3/UL (ref 4.5–10)

## 2022-05-31 RX ADMIN — ASPIRIN 81 MG CHEWABLE TABLET SCH MG: 81 TABLET CHEWABLE at 08:13

## 2022-05-31 RX ADMIN — LINAGLIPTIN SCH MG: 5 TABLET, FILM COATED ORAL at 08:14

## 2022-05-31 RX ADMIN — APIXABAN SCH MG: 5 TABLET, FILM COATED ORAL at 08:14

## 2022-05-31 RX ADMIN — ASPIRIN SCH: 325 TABLET ORAL at 08:16

## 2022-05-31 RX ADMIN — TAMSULOSIN HYDROCHLORIDE SCH MG: 0.4 CAPSULE ORAL at 08:14

## 2022-05-31 RX ADMIN — ATORVASTATIN CALCIUM SCH MG: 20 TABLET, FILM COATED ORAL at 08:13

## 2022-05-31 RX ADMIN — INSULIN ASPART SCH UNIT: 100 INJECTION, SOLUTION INTRAVENOUS; SUBCUTANEOUS at 08:13

## 2022-05-31 RX ADMIN — CEFAZOLIN SCH: 330 INJECTION, POWDER, FOR SOLUTION INTRAMUSCULAR; INTRAVENOUS at 05:13

## 2022-05-31 RX ADMIN — INSULIN ASPART SCH UNIT: 100 INJECTION, SOLUTION INTRAVENOUS; SUBCUTANEOUS at 13:31

## 2022-05-31 NOTE — P.PN
Subjective


Progress Note Date: 05/31/22





HISTORY OF PRESENT ILLNESS: 





This is an 82-year-old male with a history of coronary artery disease, atrial 

fibrillation, hypertension, hyperlipidemia, and diabetes.  The patient is adm

itted to the hospital secondary to anemia and lower extremity edema. The 

patients BNP was within normal limits. He received IV lasix which has since been

discontinued. He denies a history of CHF. Patient received 1 unit RBC.  

Hemoglobin is 8.4, up from 7.1.  The patient denies any chest pain or pressure. 

He reports his shortness of breath has resolved.  He reports improvement in his 

lower summary edema.  Echocardiogram was completed revealing ejection fraction 

50-55%.





PHYSICAL EXAM: 


VITAL SIGNS: Reviewed.


GENERAL: Well-developed in no acute distress. 


NECK: Supple. No JVD or thyromegaly


LUNGS: Respirations even and unlabored. Lungs essentially clear to auscultation 

bilaterally.


HEART: Regular rate and rhythm.  S1 and S2 heard.


EXTREMITIES: Normal range of motion.  No clubbing or cyanosis.  Peripheral 

pulses intact.  1+ bilateral lower extremity edema





ASSESSMENT: 


Shortness of breath


Anemia, etiology unclear


Paroxysmal atrial fibrillation


Coronary artery disease


Hypertension


Hyperlipidemia


Diabetes





PLAN: 


Continue Eliquis. Discontinue aspirin.


Begin iron supplementation. Check Ferritin. Begin Protonix 40mg daily


No need for diuretics on an outpatient basis


SOB appears mainly related to symptomatic anemia.  Stop Aspirin and may consider

holding Eliquis if continued anemia.  Has been receiving some chemo for prostate

cancer however denies any recent.  Admits to previous hematuria however recently

has not had any.  OK for DC.








Nurse practitioner note has been reviewed by physician. Signing provider agrees 

with the documented findings, assessment, and plan of care. 








Objective





- Vital Signs


Vital signs: 


                                   Vital Signs











Temp  97.3 F L  05/31/22 07:00


 


Pulse  78   05/31/22 07:00


 


Resp  16   05/31/22 07:00


 


BP  116/63   05/31/22 07:00


 


Pulse Ox  96   05/31/22 07:00


 


FiO2      








                                 Intake & Output











 05/30/22 05/31/22 05/31/22





 18:59 06:59 18:59


 


Intake Total 546  


 


Balance 546  


 


Intake:   


 


  Oral 236  


 


  Blood Product 310  


 


    Rc As-1  Unit 310  





    [id number]   


 


Other:   


 


  Voiding Method  Toilet 


 


  # Voids 2 2 














- Labs


CBC & Chem 7: 


                                 05/31/22 07:51





                                 05/31/22 07:51


Labs: 


                  Abnormal Lab Results - Last 24 Hours (Table)











  05/30/22 05/30/22 05/30/22 Range/Units





  06:45 11:55 16:47 


 


RBC     (4.40-5.60)  X 10*6/uL


 


Hgb     (13.0-17.0)  g/dL


 


Hct     (39.6-50.0)  %


 


MCV     (80.0-97.0)  fL


 


MCH     (27.0-32.0)  pg


 


MCHC     (32.0-37.0)  g/dL


 


RDW     (11.5-14.5)  %


 


Chloride     ()  mmol/L


 


BUN/Creatinine Ratio     (12.00-20.00)  Ratio


 


Glucose     ()  mg/dL


 


POC Glucose (mg/dL)   289 H   (75-99)  mg/dL


 


Plasma Lactic Acid Owen    2.4 H*  (0.7-2.0)  mmol/L


 


Crossmatch  See Detail    














  05/30/22 05/30/22 05/30/22 Range/Units





  17:00 19:57 20:35 


 


RBC     (4.40-5.60)  X 10*6/uL


 


Hgb     (13.0-17.0)  g/dL


 


Hct     (39.6-50.0)  %


 


MCV     (80.0-97.0)  fL


 


MCH     (27.0-32.0)  pg


 


MCHC     (32.0-37.0)  g/dL


 


RDW     (11.5-14.5)  %


 


Chloride     ()  mmol/L


 


BUN/Creatinine Ratio     (12.00-20.00)  Ratio


 


Glucose     ()  mg/dL


 


POC Glucose (mg/dL)  233 H   267 H  (75-99)  mg/dL


 


Plasma Lactic Acid Owen   2.4 H*   (0.7-2.0)  mmol/L


 


Crossmatch     














  05/31/22 05/31/22 05/31/22 Range/Units





  07:38 07:51 07:51 


 


RBC   4.23 L   (4.40-5.60)  X 10*6/uL


 


Hgb   8.4 L   (13.0-17.0)  g/dL


 


Hct   30.5 L   (39.6-50.0)  %


 


MCV   72.1 L   (80.0-97.0)  fL


 


MCH   19.9 L   (27.0-32.0)  pg


 


MCHC   27.5 L   (32.0-37.0)  g/dL


 


RDW   19.4 H   (11.5-14.5)  %


 


Chloride    110 H  ()  mmol/L


 


BUN/Creatinine Ratio    26.00 H  (12.00-20.00)  Ratio


 


Glucose    124 H  ()  mg/dL


 


POC Glucose (mg/dL)  136 H    (75-99)  mg/dL


 


Plasma Lactic Acid Owen     (0.7-2.0)  mmol/L


 


Crossmatch

## 2022-05-31 NOTE — P.DS
Providers


Date of admission: 


05/30/22 06:26





Expected date of discharge: 05/31/22


Attending physician: 


Maco Christiansen





Consults: 





                                        





05/30/22 06:27


Consult Physician Routine 


   Consulting Provider: Brandon Maier


   Consult Reason/Comments: sob


   Do you want consulting provider notified?: Yes











Primary care physician: 


Maco Christiansen





Hospital Course: 





HISTORY OF PRESENT ILLNESS


This is an 82-year-old male patient of mine with past medical history of 

coronary artery disease, hypertension, hyperlipidemia, diabetes mellitus type 2,

generalized anxiety disorder, bladder cancer under the care of Dr. Chris, 

chronic gout.  Patient states that he was seen by Dr. Chris in his office last 

Wednesday Patient is undergoing BCG last with installation in the bladder for 2 

hours and after that patient will void he was supposed to return to the office 

next Wednesday for another treatment however the patient became quite weak with 

generalized weakness and increased swelling in both lower extremities associated

with increased dyspnea on exertion, so he ended up coming to the emergency 

department at [facility] yesterday and he was found to have a 

hemoglobin of 7 central sequelae was admitted to the hospital for blood 

transfusion and evaluated the patient EKG did not show evidence of acute ST-T 

wave changes, his laboratory evaluation otherwise were within normal.  





5/31: Repeat hemoglobin today is 8.4.  Otherwise laboratory studies 

unremarkable.  Patient denies any new complaints.  Cardiology has recommended 

continuing eliquis, discontinue aspirin. Patient will be discharged home today 

in stable condition.


 


DISCHARGE DIAGNOSES


1.  Dyspnea on exertion likely related to low hemoglobin status post transfusion

of 1 unit of packed RBCs.


2.  Hypertension and hypertensive cardiovascular disease.  


3.  Hyperlipidemia.  


4.  Diabetes mellitus type 2.  


5.  Paroxysmal atrial fibrillation. 


6.  History of bladder cancer under the care Dr Chris status post BCG treatment


7.  Chronic gout.





DISCHARGE PLAN


Home


Greater than 35 minutes was utilized and coordinating patient's discharge.


Impression and plan of care have been directed as dictated by the signing 

physician.  Millie Crump nurse practitioner acting as scribe for signing 

physician.


Patient Condition at Discharge: Good





Plan - Discharge Summary


New Discharge Prescriptions: 


New


   Ferrous Sulfate [Iron (65 MG Elemental)] 325 mg PO BID-W/MEALS #60 tab


   Semaglutide [Ozempic] 0.25 mg SQ Q7D #4 pen


   Potassium Chloride ER [K-Dur 10] 10 meq PO DAILY #30 tab


   Furosemide [Lasix] 20 mg PO DAILY #30 tab





Continue


   sitaGLIPtin PHOS/metFORMIN HCL [Janumet 50-1,000 mg Tablet] 1 tab PO DAILY


   Rosuvastatin Calcium [Crestor] 10 mg PO DAILY


   allopurinoL [Zyloprim] 100 mg PO DAILY


   Insulin Glargine,Hum.rec.anlog [Lantus Solostar Pen] 65 unit SQ HS


   lisinopriL [Zestril] 2.5 mg PO DAILY  tab


   Apixaban [Eliquis] 5 mg PO BID


   Tamsulosin [Flomax] 0.4 mg PO DAILY


   Empagliflozin [Jardiance] 10 mg PO DAILY


   Insulin Lispro [humaLOG Kwikpen] 10 unit SQ AC-TID





Discontinued


   Aspirin EC [Ecotrin Low Dose] 81 mg PO DAILY


Discharge Medication List





Insulin Glargine,Hum.rec.anlog [Lantus Solostar Pen] 65 unit SQ HS 12/22/14 

[History]


Rosuvastatin Calcium [Crestor] 10 mg PO DAILY 12/22/14 [History]


allopurinoL [Zyloprim] 100 mg PO DAILY 12/22/14 [History]


sitaGLIPtin PHOS/metFORMIN HCL [Janumet 50-1,000 mg Tablet] 1 tab PO DAILY 

12/22/14 [History]


lisinopriL [Zestril] 2.5 mg PO DAILY  tab 10/07/18 [Rx]


Insulin Lispro [humaLOG Kwikpen] 10 unit SQ AC-TID 08/04/21 [History]


Apixaban [Eliquis] 5 mg PO BID 09/13/21 [History]


Empagliflozin [Jardiance] 10 mg PO DAILY 05/30/22 [History]


Tamsulosin [Flomax] 0.4 mg PO DAILY 05/30/22 [History]


Ferrous Sulfate [Iron (65 MG Elemental)] 325 mg PO BID-W/MEALS #60 tab 05/31/22 

[Rx]


Furosemide [Lasix] 20 mg PO DAILY #30 tab 05/31/22 [Rx]


Potassium Chloride ER [K-Dur 10] 10 meq PO DAILY #30 tab 05/31/22 [Rx]


Semaglutide [Ozempic] 0.25 mg SQ Q7D #4 pen 05/31/22 [Rx]








Follow up Appointment(s)/Referral(s): 


Maco Christiansen MD [Primary Care Provider] - 1 Week


Discharge Disposition: HOME SELF-CARE

## 2022-06-02 ENCOUNTER — HOSPITAL ENCOUNTER (EMERGENCY)
Dept: HOSPITAL 47 - EC | Age: 82
Discharge: HOME | End: 2022-06-02
Payer: MEDICARE

## 2022-06-02 VITALS — SYSTOLIC BLOOD PRESSURE: 136 MMHG | HEART RATE: 78 BPM | DIASTOLIC BLOOD PRESSURE: 80 MMHG

## 2022-06-02 VITALS — RESPIRATION RATE: 18 BRPM | TEMPERATURE: 98 F

## 2022-06-02 DIAGNOSIS — F17.200: ICD-10-CM

## 2022-06-02 DIAGNOSIS — I10: ICD-10-CM

## 2022-06-02 DIAGNOSIS — E11.9: ICD-10-CM

## 2022-06-02 DIAGNOSIS — E78.5: ICD-10-CM

## 2022-06-02 DIAGNOSIS — R31.9: Primary | ICD-10-CM

## 2022-06-02 LAB
BASOPHILS # BLD AUTO: 0 K/UL (ref 0–0.2)
BASOPHILS NFR BLD AUTO: 1 %
EOSINOPHIL # BLD AUTO: 0.2 K/UL (ref 0–0.7)
EOSINOPHIL NFR BLD AUTO: 6 %
ERYTHROCYTE [DISTWIDTH] IN BLOOD BY AUTOMATED COUNT: 4 M/UL (ref 4.3–5.9)
ERYTHROCYTE [DISTWIDTH] IN BLOOD: 18.7 % (ref 11.5–15.5)
GLUCOSE UR QL: (no result)
HCT VFR BLD AUTO: 29.3 % (ref 39–53)
HGB BLD-MCNC: 8.4 GM/DL (ref 13–17.5)
LYMPHOCYTES # SPEC AUTO: 1 K/UL (ref 1–4.8)
LYMPHOCYTES NFR SPEC AUTO: 26 %
MCH RBC QN AUTO: 21 PG (ref 25–35)
MCHC RBC AUTO-ENTMCNC: 28.7 G/DL (ref 31–37)
MCV RBC AUTO: 73.2 FL (ref 80–100)
MONOCYTES # BLD AUTO: 0.2 K/UL (ref 0–1)
MONOCYTES NFR BLD AUTO: 6 %
NEUTROPHILS # BLD AUTO: 2.1 K/UL (ref 1.3–7.7)
NEUTROPHILS NFR BLD AUTO: 57 %
PH UR: 5.5 [PH] (ref 5–8)
PLATELET # BLD AUTO: 129 K/UL (ref 150–450)
PROT UR QL: (no result)
RBC UR QL: >182 /HPF (ref 0–5)
SP GR UR: 1.01 (ref 1–1.03)
SQUAMOUS UR QL AUTO: 2 /HPF (ref 0–4)
UROBILINOGEN UR QL STRIP: <2 MG/DL (ref ?–2)
WBC # BLD AUTO: 3.6 K/UL (ref 3.8–10.6)
WBC # UR AUTO: 5 /HPF (ref 0–5)

## 2022-06-02 PROCEDURE — 36415 COLL VENOUS BLD VENIPUNCTURE: CPT

## 2022-06-02 PROCEDURE — 85025 COMPLETE CBC W/AUTO DIFF WBC: CPT

## 2022-06-02 PROCEDURE — 81001 URINALYSIS AUTO W/SCOPE: CPT

## 2022-06-02 NOTE — ED
General Adult HPI





- General


Chief complaint: Urogenital


Stated complaint: Recheck


Time Seen by Provider: 06/02/22 15:40


Source: patient, RN notes reviewed, old records reviewed


Mode of arrival: ambulatory


Limitations: no limitations





- History of Present Illness


Initial comments: 





This is an 82-year-old male who presents emergency Department complaining that 

he has had hematuria since this morning.  Patient states he was admitted to the 

hospital about a week ago for anemia and was given a transfusion of blood.  

Patient states he is on eliquis for his heart.  Patient states he also just 

recently had a procedure done with urology with the injected some medication 

into his bladder that occurred yesterday.  Patient states this morning he woke 

up and had blood in his urine and continued later today so decided come the 

emergency department.  Patient states he is not week he's denies shortness of 

breath or difficulty breathing.  Patient denies any chest pain.  Patient denies 

any palpitations.  Patient denies any abdominal pain or back pain.  Patient 

denies any recent fever chills.





- Related Data


                                Home Medications











 Medication  Instructions  Recorded  Confirmed


 


Insulin Glargine,Hum.rec.anlog 65 unit SQ HS 12/22/14 05/30/22





[Lantus Solostar Pen]   


 


Rosuvastatin Calcium [Crestor] 10 mg PO DAILY 12/22/14 05/30/22


 


allopurinoL [Zyloprim] 100 mg PO DAILY 12/22/14 05/30/22


 


sitaGLIPtin PHOS/metFORMIN HCL 1 tab PO DAILY 12/22/14 05/30/22





[Janumet 50-1,000 mg Tablet]   


 


Insulin Lispro [humaLOG Kwikpen] 10 unit SQ AC-TID 08/04/21 05/30/22


 


Apixaban [Eliquis] 5 mg PO BID 09/13/21 05/30/22


 


Empagliflozin [Jardiance] 10 mg PO DAILY 05/30/22 05/30/22


 


Tamsulosin [Flomax] 0.4 mg PO DAILY 05/30/22 05/30/22








                                  Previous Rx's











 Medication  Instructions  Recorded


 


lisinopriL [Zestril] 2.5 mg PO DAILY  tab 10/07/18


 


Ferrous Sulfate [Iron (65  mg PO BID-W/MEALS #60 tab 05/31/22





Elemental)]  


 


Furosemide [Lasix] 20 mg PO DAILY #30 tab 05/31/22


 


Potassium Chloride ER [K-Dur 10] 10 meq PO DAILY #30 tab 05/31/22


 


Semaglutide [Ozempic] 0.25 mg SQ Q7D #4 pen 05/31/22











                                    Allergies











Allergy/AdvReac Type Severity Reaction Status Date / Time


 


No Known Allergies Allergy   Verified 05/30/22 07:34














Review of Systems


ROS Statement: 


Those systems with pertinent positive or pertinent negative responses have been 

documented in the HPI.





ROS Other: All systems not noted in ROS Statement are negative.





Past Medical History


Past Medical History: Atrial Flutter, Coronary Artery Disease (CAD), Cancer, 

Chest Pain / Angina, Diabetes Mellitus, Hearing Disorder / Deafness, 

Hyperlipidemia, Hypertension, Prostate Disorder, Renal Disease


Additional Past Medical History / Comment(s): BPH, bladder cancer 5/1/21, 

hearing aids


History of Any Multi-Drug Resistant Organisms: None Reported


Past Surgical History: Appendectomy, Bladder Surgery, Heart Catheterization, 

Joint Replacement, Tonsillectomy


Additional Past Surgical History / Comment(s): left knee replaced, right rotator

 cuff SX, COLONOSCOPY,


Past Anesthesia/Blood Transfusion Reactions: No Reported Reaction


Past Psychological History: Depression


Smoking Status: Former smoker


Past Alcohol Use History: Occasional


Past Drug Use History: None Reported





- Past Family History


  ** Mother


Additional Family Medical History / Comment(s): Mother had history of congenital

 heart disease, CHF.





  ** Father


Additional Family Medical History / Comment(s): Father contracted malaria.  

History of CVA.





  ** Brother(s)


Family Medical History: No Reported History


Additional Family Medical History / Comment(s): Brother has history of 

headaches.





  ** Sister(s)


Family Medical History: No Reported History





  ** Daughter(s)


Family Medical History: No Reported History


Additional Family Medical History / Comment(s): Patient has one daughter with no

 major medical problems.





  ** Son(s)


Family Medical History: No Reported History


Additional Family Medical History / Comment(s): Patient has one son with no 

major medical problems.





General Exam





- General Exam Comments


Initial Comments: 





GENERAL:


Patient is well-developed and well-nourished.  Patient is nontoxic and well-

hydrated and is in no acute distress.





ENT:


Neck is soft and supple.  No significant lymphadenopathy is noted.  Oropharynx 

is clear.  Moist mucous membranes.  Neck has full range of motion without 

eliciting any pain.  There is no thyroid enlargement and no masses were felt.





EYES:


The sclera were anicteric and conjunctiva were pink and moist.  Extraocular 

movements were intact and pupils were equal round and reactive to light.  

Eyelids were unremarkable.





PULMONARY:


Unlabored respirations.  Good breath sounds bilaterally.  No audible rales 

rhonchi or wheezing was noted.





CARDIOVASCULAR:


There is a regular rate and rhythm without any murmurs gallops or rubs.





ABDOMEN:


Soft and nontender with normal bowel sounds. 





SKIN:


Skin is clear with no lesions or rashes and otherwise unremarkable.





NEUROLOGIC:


Patient is alert and oriented x3.  Cranial nerves II through XII are grossly 

intact.  Motor and sensory are also intact.  Normal speech, volume and content. 

 Symmetrical smile. 





MUSCULOSKELETAL:


Normal extremities with adequate strength and full range of motion.  





PSYCHIATRIC:


Normal psychiatric evaluation.


Limitations: no limitations





Course


                                   Vital Signs











  06/02/22





  15:34


 


Temperature 98.0 F


 


Pulse Rate 79


 


Respiratory 18





Rate 


 


Blood Pressure 143/60


 


O2 Sat by Pulse 98





Oximetry 














Medical Decision Making





- Medical Decision Making





I spoke with Dr. Gonzalez about the patient's results and he agreed the patient 

could follow-up with them in the office.





- Lab Data


Result diagrams: 


                                 06/02/22 16:44





                                   Lab Results











  06/02/22 06/02/22 Range/Units





  16:08 16:44 


 


WBC   3.6 L  (3.8-10.6)  k/uL


 


RBC   4.00 L  (4.30-5.90)  m/uL


 


Hgb   8.4 L  (13.0-17.5)  gm/dL


 


Hct   29.3 L  (39.0-53.0)  %


 


MCV   73.2 L  (80.0-100.0)  fL


 


MCH   21.0 L  (25.0-35.0)  pg


 


MCHC   28.7 L  (31.0-37.0)  g/dL


 


RDW   18.7 H  (11.5-15.5)  %


 


Plt Count   129 L  (150-450)  k/uL


 


MPV   7.8  


 


Neutrophils %   57  %


 


Lymphocytes %   26  %


 


Monocytes %   6  %


 


Eosinophils %   6  %


 


Basophils %   1  %


 


Neutrophils #   2.1  (1.3-7.7)  k/uL


 


Lymphocytes #   1.0  (1.0-4.8)  k/uL


 


Monocytes #   0.2  (0-1.0)  k/uL


 


Eosinophils #   0.2  (0-0.7)  k/uL


 


Basophils #   0.0  (0-0.2)  k/uL


 


Hypochromasia   Marked  


 


Poikilocytosis   Slight  


 


Anisocytosis   Slight  


 


Microcytosis   Moderate  


 


Urine Color  Light Red   


 


Urine Appearance  Cloudy   (Clear)  


 


Urine pH  5.5   (5.0-8.0)  


 


Ur Specific Gravity  1.013   (1.001-1.035)  


 


Urine Protein  1+ H   (Negative)  


 


Urine Glucose (UA)  4+ H   (Negative)  


 


Urine Ketones  Negative   (Negative)  


 


Urine Blood  Large H   (Negative)  


 


Urine Nitrite  Negative   (Negative)  


 


Urine Bilirubin  Negative   (Negative)  


 


Urine Urobilinogen  <2.0   (<2.0)  mg/dL


 


Ur Leukocyte Esterase  Small H   (Negative)  


 


Urine RBC  >182 H   (0-5)  /hpf


 


Urine WBC  5   (0-5)  /hpf


 


Ur Squamous Epith Cells  2   (0-4)  /hpf














Disposition


Clinical Impression: 


 Hematuria





Disposition: HOME SELF-CARE


Condition: Good


Instructions (If sedation given, give patient instructions):  Hematuria (ED)


Is patient prescribed a controlled substance at d/c from ED?: No


Referrals: 


Maco Christiansen MD [Primary Care Provider] - 1-2 days


Time of Disposition: 17:34

## 2023-07-01 ENCOUNTER — HOSPITAL ENCOUNTER (EMERGENCY)
Dept: HOSPITAL 47 - EC | Age: 83
Discharge: HOME | End: 2023-07-01
Payer: MEDICARE

## 2023-07-01 ENCOUNTER — HOSPITAL ENCOUNTER (OUTPATIENT)
Dept: HOSPITAL 47 - EC | Age: 83
Setting detail: OBSERVATION
LOS: 2 days | Discharge: HOME | End: 2023-07-03
Attending: INTERNAL MEDICINE | Admitting: INTERNAL MEDICINE
Payer: MEDICARE

## 2023-07-01 VITALS — SYSTOLIC BLOOD PRESSURE: 130 MMHG | DIASTOLIC BLOOD PRESSURE: 72 MMHG | RESPIRATION RATE: 16 BRPM | HEART RATE: 61 BPM

## 2023-07-01 VITALS — TEMPERATURE: 97.5 F

## 2023-07-01 DIAGNOSIS — R16.1: ICD-10-CM

## 2023-07-01 DIAGNOSIS — F32.A: ICD-10-CM

## 2023-07-01 DIAGNOSIS — Z87.891: ICD-10-CM

## 2023-07-01 DIAGNOSIS — Z79.4: ICD-10-CM

## 2023-07-01 DIAGNOSIS — I48.92: ICD-10-CM

## 2023-07-01 DIAGNOSIS — K76.9: ICD-10-CM

## 2023-07-01 DIAGNOSIS — E11.9: ICD-10-CM

## 2023-07-01 DIAGNOSIS — Z85.51: ICD-10-CM

## 2023-07-01 DIAGNOSIS — Z79.899: ICD-10-CM

## 2023-07-01 DIAGNOSIS — H91.90: ICD-10-CM

## 2023-07-01 DIAGNOSIS — Z82.79: ICD-10-CM

## 2023-07-01 DIAGNOSIS — Z79.01: ICD-10-CM

## 2023-07-01 DIAGNOSIS — D69.6: ICD-10-CM

## 2023-07-01 DIAGNOSIS — I25.10: ICD-10-CM

## 2023-07-01 DIAGNOSIS — N40.0: ICD-10-CM

## 2023-07-01 DIAGNOSIS — K85.90: Primary | ICD-10-CM

## 2023-07-01 DIAGNOSIS — K56.7: ICD-10-CM

## 2023-07-01 DIAGNOSIS — E78.5: ICD-10-CM

## 2023-07-01 DIAGNOSIS — Z79.84: ICD-10-CM

## 2023-07-01 DIAGNOSIS — K85.90: ICD-10-CM

## 2023-07-01 DIAGNOSIS — K40.90: ICD-10-CM

## 2023-07-01 DIAGNOSIS — I10: ICD-10-CM

## 2023-07-01 DIAGNOSIS — R07.9: Primary | ICD-10-CM

## 2023-07-01 DIAGNOSIS — Z97.4: ICD-10-CM

## 2023-07-01 DIAGNOSIS — Z83.1: ICD-10-CM

## 2023-07-01 DIAGNOSIS — Z79.85: ICD-10-CM

## 2023-07-01 DIAGNOSIS — Z96.652: ICD-10-CM

## 2023-07-01 DIAGNOSIS — I44.0: ICD-10-CM

## 2023-07-01 DIAGNOSIS — Z82.3: ICD-10-CM

## 2023-07-01 DIAGNOSIS — Z98.890: ICD-10-CM

## 2023-07-01 LAB
ALBUMIN SERPL-MCNC: 3.7 G/DL (ref 3.5–5)
ALBUMIN SERPL-MCNC: 4 G/DL (ref 3.5–5)
ALP SERPL-CCNC: 94 U/L (ref 38–126)
ALP SERPL-CCNC: 98 U/L (ref 38–126)
ALT SERPL-CCNC: 22 U/L (ref 4–49)
ALT SERPL-CCNC: 38 U/L (ref 4–49)
ANION GAP SERPL CALC-SCNC: 10 MMOL/L
ANION GAP SERPL CALC-SCNC: 6 MMOL/L
APTT BLD: 24.9 SEC (ref 22–30)
APTT BLD: 25.8 SEC (ref 22–30)
AST SERPL-CCNC: 37 U/L (ref 17–59)
AST SERPL-CCNC: 99 U/L (ref 17–59)
BASOPHILS # BLD AUTO: 0 K/UL (ref 0–0.2)
BASOPHILS # BLD AUTO: 0 K/UL (ref 0–0.2)
BASOPHILS NFR BLD AUTO: 0 %
BASOPHILS NFR BLD AUTO: 0 %
BUN SERPL-SCNC: 26 MG/DL (ref 9–20)
BUN SERPL-SCNC: 27 MG/DL (ref 9–20)
CALCIUM SPEC-MCNC: 8.6 MG/DL (ref 8.4–10.2)
CALCIUM SPEC-MCNC: 8.8 MG/DL (ref 8.4–10.2)
CHLORIDE SERPL-SCNC: 107 MMOL/L (ref 98–107)
CHLORIDE SERPL-SCNC: 112 MMOL/L (ref 98–107)
CO2 SERPL-SCNC: 19 MMOL/L (ref 22–30)
CO2 SERPL-SCNC: 21 MMOL/L (ref 22–30)
EOSINOPHIL # BLD AUTO: 0.3 K/UL (ref 0–0.7)
EOSINOPHIL # BLD AUTO: 0.3 K/UL (ref 0–0.7)
EOSINOPHIL NFR BLD AUTO: 6 %
EOSINOPHIL NFR BLD AUTO: 6 %
ERYTHROCYTE [DISTWIDTH] IN BLOOD BY AUTOMATED COUNT: 4.43 M/UL (ref 4.3–5.9)
ERYTHROCYTE [DISTWIDTH] IN BLOOD BY AUTOMATED COUNT: 4.67 M/UL (ref 4.3–5.9)
ERYTHROCYTE [DISTWIDTH] IN BLOOD: 14.9 % (ref 11.5–15.5)
ERYTHROCYTE [DISTWIDTH] IN BLOOD: 14.9 % (ref 11.5–15.5)
GLUCOSE SERPL-MCNC: 139 MG/DL (ref 74–99)
GLUCOSE SERPL-MCNC: 174 MG/DL (ref 74–99)
HCT VFR BLD AUTO: 37.9 % (ref 39–53)
HCT VFR BLD AUTO: 40.5 % (ref 39–53)
HGB BLD-MCNC: 12.5 GM/DL (ref 13–17.5)
HGB BLD-MCNC: 12.9 GM/DL (ref 13–17.5)
INR PPP: 1 (ref ?–1.2)
INR PPP: 1.1 (ref ?–1.2)
LIPASE SERPL-CCNC: 1368 U/L (ref 23–300)
LIPASE SERPL-CCNC: 4929 U/L (ref 23–300)
LYMPHOCYTES # SPEC AUTO: 1.4 K/UL (ref 1–4.8)
LYMPHOCYTES # SPEC AUTO: 1.7 K/UL (ref 1–4.8)
LYMPHOCYTES NFR SPEC AUTO: 30 %
LYMPHOCYTES NFR SPEC AUTO: 32 %
MAGNESIUM SPEC-SCNC: 1.6 MG/DL (ref 1.6–2.3)
MAGNESIUM SPEC-SCNC: 1.7 MG/DL (ref 1.6–2.3)
MCH RBC QN AUTO: 27.7 PG (ref 25–35)
MCH RBC QN AUTO: 28.3 PG (ref 25–35)
MCHC RBC AUTO-ENTMCNC: 32 G/DL (ref 31–37)
MCHC RBC AUTO-ENTMCNC: 33 G/DL (ref 31–37)
MCV RBC AUTO: 85.6 FL (ref 80–100)
MCV RBC AUTO: 86.7 FL (ref 80–100)
MONOCYTES # BLD AUTO: 0.3 K/UL (ref 0–1)
MONOCYTES # BLD AUTO: 0.3 K/UL (ref 0–1)
MONOCYTES NFR BLD AUTO: 6 %
MONOCYTES NFR BLD AUTO: 7 %
NEUTROPHILS # BLD AUTO: 2.6 K/UL (ref 1.3–7.7)
NEUTROPHILS # BLD AUTO: 2.8 K/UL (ref 1.3–7.7)
NEUTROPHILS NFR BLD AUTO: 54 %
NEUTROPHILS NFR BLD AUTO: 56 %
PLATELET # BLD AUTO: 85 K/UL (ref 150–450)
PLATELET # BLD AUTO: 89 K/UL (ref 150–450)
POTASSIUM SERPL-SCNC: 4.2 MMOL/L (ref 3.5–5.1)
POTASSIUM SERPL-SCNC: 4.9 MMOL/L (ref 3.5–5.1)
PROT SERPL-MCNC: 6.4 G/DL (ref 6.3–8.2)
PROT SERPL-MCNC: 7 G/DL (ref 6.3–8.2)
PT BLD: 11 SEC (ref 9–12)
PT BLD: 11.1 SEC (ref 9–12)
SODIUM SERPL-SCNC: 136 MMOL/L (ref 137–145)
SODIUM SERPL-SCNC: 139 MMOL/L (ref 137–145)
WBC # BLD AUTO: 4.6 K/UL (ref 3.8–10.6)
WBC # BLD AUTO: 5.2 K/UL (ref 3.8–10.6)

## 2023-07-01 PROCEDURE — 96372 THER/PROPH/DIAG INJ SC/IM: CPT

## 2023-07-01 PROCEDURE — 83690 ASSAY OF LIPASE: CPT

## 2023-07-01 PROCEDURE — 93005 ELECTROCARDIOGRAM TRACING: CPT

## 2023-07-01 PROCEDURE — 82747 ASSAY OF FOLIC ACID RBC: CPT

## 2023-07-01 PROCEDURE — 99285 EMERGENCY DEPT VISIT HI MDM: CPT

## 2023-07-01 PROCEDURE — 85384 FIBRINOGEN ACTIVITY: CPT

## 2023-07-01 PROCEDURE — 83921 ORGANIC ACID SINGLE QUANT: CPT

## 2023-07-01 PROCEDURE — 85730 THROMBOPLASTIN TIME PARTIAL: CPT

## 2023-07-01 PROCEDURE — 83735 ASSAY OF MAGNESIUM: CPT

## 2023-07-01 PROCEDURE — 36415 COLL VENOUS BLD VENIPUNCTURE: CPT

## 2023-07-01 PROCEDURE — 85025 COMPLETE CBC W/AUTO DIFF WBC: CPT

## 2023-07-01 PROCEDURE — 80061 LIPID PANEL: CPT

## 2023-07-01 PROCEDURE — 85610 PROTHROMBIN TIME: CPT

## 2023-07-01 PROCEDURE — 80053 COMPREHEN METABOLIC PANEL: CPT

## 2023-07-01 PROCEDURE — 84484 ASSAY OF TROPONIN QUANT: CPT

## 2023-07-01 PROCEDURE — 96374 THER/PROPH/DIAG INJ IV PUSH: CPT

## 2023-07-01 PROCEDURE — 96360 HYDRATION IV INFUSION INIT: CPT

## 2023-07-01 PROCEDURE — 99291 CRITICAL CARE FIRST HOUR: CPT

## 2023-07-01 PROCEDURE — 94760 N-INVAS EAR/PLS OXIMETRY 1: CPT

## 2023-07-01 PROCEDURE — 82607 VITAMIN B-12: CPT

## 2023-07-01 PROCEDURE — 96376 TX/PRO/DX INJ SAME DRUG ADON: CPT

## 2023-07-01 PROCEDURE — 74177 CT ABD & PELVIS W/CONTRAST: CPT

## 2023-07-01 PROCEDURE — 71046 X-RAY EXAM CHEST 2 VIEWS: CPT

## 2023-07-01 PROCEDURE — 83880 ASSAY OF NATRIURETIC PEPTIDE: CPT

## 2023-07-01 PROCEDURE — 96361 HYDRATE IV INFUSION ADD-ON: CPT

## 2023-07-01 NOTE — ED
Chest Pain HPI





- General


Chief Complaint: Chest Pain


Stated Complaint: Chest pain


Time Seen by Provider: 07/01/23 04:18


Source: patient, RN notes reviewed, old records reviewed


Mode of arrival: ambulatory


Limitations: no limitations





- History of Present Illness


Initial Comments: 





This is an 83-year-old male to the emergency department for evaluation of chest 

pain chest pain since yesterday persisting into today.  Patient has persistent 

chest pain here in the ER although significantly improved patient states his 

pain feels exactly currently resolved.  No sweating no shortness of breath other

complaints recent fever cough congestion


MD Complaint: chest pain


-: days(s)


Onset: during rest, during exertion, awoke with symptoms


Pain Location: epigastric


Pain Radiation: none


Severity: moderate


Quality: tightness, heaviness


Consistency: constant, intermittent


Improves With: nothing


Worsens With: nothing


Other Symptoms: palpitations


Treatments Prior to Arrival: none





- Related Data


                                Home Medications











 Medication  Instructions  Recorded  Confirmed


 


Insulin Glargine,Hum.rec.anlog 65 unit SQ HS 12/22/14 07/01/23





[Lantus Solostar Pen]   


 


Rosuvastatin Calcium [Crestor] 10 mg PO DAILY 12/22/14 07/01/23


 


allopurinoL [Zyloprim] 100 mg PO DAILY 12/22/14 07/01/23


 


Insulin Lispro [humaLOG Kwikpen] 10 unit SQ AC-TID 08/04/21 07/01/23


 


Apixaban [Eliquis] 5 mg PO BID 09/13/21 07/01/23


 


Tamsulosin [Flomax] 0.4 mg PO BID 05/30/22 07/01/23


 


Sildenafil Citrate 100 mg PO DAILY PRN 07/01/23 07/01/23








                                  Previous Rx's











 Medication  Instructions  Recorded


 


lisinopriL [Zestril] 2.5 mg PO DAILY  tab 10/07/18











                                    Allergies











Allergy/AdvReac Type Severity Reaction Status Date / Time


 


No Known Allergies Allergy   Verified 07/01/23 20:39














Review of Systems


ROS Statement: 


Those systems with pertinent positive or pertinent negative responses have been 

documented in the HPI.





ROS Other: All systems not noted in ROS Statement are negative.





EKG Findings





- EKG Comments:


EKG Findings:: EKG is sinus 60.  She 30  QTc 4:30





- EKG Results:


EKG: interpreted by MELECIO





Past Medical History


Past Medical History: Atrial Flutter, Coronary Artery Disease (CAD), Cancer, 

Chest Pain / Angina, Diabetes Mellitus, Hearing Disorder / Deafness, 

Hyperlipidemia, Hypertension, Prostate Disorder, Renal Disease


Additional Past Medical History / Comment(s): BPH, bladder cancer 5/1/21, 

hearing aids


History of Any Multi-Drug Resistant Organisms: None Reported


Past Surgical History: Appendectomy, Bladder Surgery, Heart Catheterization, 

Joint Replacement, Tonsillectomy


Additional Past Surgical History / Comment(s): left knee replaced, right rotator

 cuff SX, COLONOSCOPY,


Past Anesthesia/Blood Transfusion Reactions: No Reported Reaction


Past Psychological History: Depression


Smoking Status: Former smoker


Past Alcohol Use History: Occasional


Past Drug Use History: None Reported





- Past Family History


  ** Mother


Family Medical History: No Reported History


Additional Family Medical History / Comment(s): Mother had history of congenital

 heart disease, CHF.





  ** Father


Family Medical History: CVA/TIA


Additional Family Medical History / Comment(s): Father contracted malaria.  

History of CVA.





  ** Brother(s)


Family Medical History: No Reported History


Additional Family Medical History / Comment(s): Brother has history of 

headaches.





  ** Sister(s)


Family Medical History: No Reported History





  ** Daughter(s)


Family Medical History: No Reported History


Additional Family Medical History / Comment(s): Patient has one daughter with no

 major medical problems.





  ** Son(s)


Family Medical History: No Reported History


Additional Family Medical History / Comment(s): Patient has one son with no 

major medical problems.





General Exam


Limitations: no limitations


General appearance: alert, in no apparent distress, anxious


Head exam: Present: atraumatic, normocephalic, normal inspection


Eye exam: Present: normal appearance, PERRL, EOMI.  Absent: scleral icterus, 

conjunctival injection, periorbital swelling


ENT exam: Present: normal exam, mucous membranes moist


Neck exam: Present: normal inspection.  Absent: tenderness, meningismus, 

lymphadenopathy


Respiratory exam: Present: normal lung sounds bilaterally.  Absent: respiratory 

distress, wheezes, rales, rhonchi, stridor


Cardiovascular Exam: Present: regular rate, normal rhythm, normal heart sounds. 

 Absent: systolic murmur, diastolic murmur, rubs, gallop, clicks


GI/Abdominal exam: Present: soft, normal bowel sounds.  Absent: distended, 

tenderness, guarding, rebound, rigid


Extremities exam: Present: normal inspection, full ROM, normal capillary refill.

  Absent: tenderness, pedal edema, joint swelling, calf tenderness


Back exam: Present: normal inspection


Neurological exam: Present: alert, oriented X3, CN II-XII intact


Psychiatric exam: Present: normal affect, normal mood


Skin exam: Present: warm, dry, intact, normal color.  Absent: rash





Course


                                   Vital Signs











  07/01/23 07/01/23 07/01/23





  04:12 04:35 05:17


 


Temperature 97.5 F L  


 


Pulse Rate 63  64


 


Pulse Rate [  60 





Cardiac Monitor   





]   


 


Respiratory 18  18





Rate   


 


Blood Pressure 135/64  98/68


 


O2 Sat by Pulse 98  95





Oximetry   














  07/01/23





  07:00


 


Temperature 


 


Pulse Rate 61


 


Pulse Rate [ 





Cardiac Monitor 





] 


 


Respiratory 16





Rate 


 


Blood Pressure 130/72


 


O2 Sat by Pulse 96





Oximetry 














- Reevaluation(s)


Reevaluation #1: 





07/01/23 04:25


Medical records reviewed


Reevaluation #2: 





07/01/23 06:28


Patient's chest pain has resolved


Reevaluation #3: 





07/01/23 06:28


Patient informed results questions answered


Reevaluation #4: 





07/01/23 04:25


Was pt. sent in by a medical professional or institution?


@  -no


Did you speak to anyone other than the patient for history?  


@  -no


Did you review nursing and triage notes? 


@  -agree


Were old charts reviewed? 


@  -yes including recent cardiac catheterization


Differential Diagnosis? 


@  -prior


EKG interpreted by me (3pts min.)?


@  -yes


X-rays interpreted by me (1pt min.)?


@  -yes


CT interpreted by me (1pt min.)?


@  -no


U/S interpreted by me (1pt. min.)?


@  -no


What testing was considered but not performed? (CT, X-rays, U/S, labs)? Why?


@  -no


What meds were considered but not given? Why?


@  -no


Did you discuss the management of the patient with other professionals?


@  -no


Did you reconcile home meds?


@  -no


Was smoking cessation discussed for >3mins.?


@  -no


Was critical care preformed (if so, how long)?


@  -no


Were there social determinants of health that impacted care today? How? 

(Homelessness, low income, unemployed, alcoholism, drug addiction, 

transportation, low edu. Level, literacy, decrease access to med. care, [facility]DC, 

rehab)?


@  -no


Was there de-escalation of care discussed even if they declined? (Discuss DNR or

withdrawal of care, Hospice)?


@  -no


What co-morbidities impacted this encounter? (DM, HTN, Smoking, COPD, CAD, 

Cancer, CVA, Hep., AIDS, mental health diagnosis, sleep apnea, morbid obesity)?


@  -none


Was patient admitted / discharged?


@  -83 male for chest pain no significant cause of chest pain found here in the 

ER patient's low risk as he does have recent cardiac catheterization.  Patient 

can be discharged home


Discharge


Undiagnosed new problem with uncertain prognosis?


@  -no


Drug Therapy requiring intensive monitoring for toxicity (Heparin, Nitro, 

Insulin, Cardizem)?


@  -no


Were any procedures done?


@  -no


Diagnosis/symptom?


@  -Chest pain, angina, pancreatitis


Acute, or Chronic, or Acute on Chronic?


@  -acute


Uncomplicated (without systemic symptoms) or Complicated (systemic symptoms)?


@  -complicated


Side effects of treatment?


@  -no


Exacerbation, Progression, or Severe Exacerbation]


@  -no


Poses a threat to life or bodily function?


@  -yes


Reevaluation #5: 





07/01/23 04:25


Differential Chest Pain:


Stable Angina, Unstable Angina, STEMI, NSTEMI Aortic Dissection, Pneumothorax, 

Musculoskeletal, Esophageal Spasm GERD, Cholecystitis, Pancreatitis, Zoster, 

this is not meant to be an all-inclusive list. 





Chest Pain MDM





- MDM





83 male for chest pain no significant cause of chest pain found here in the ER 

patient's low risk as he does have recent cardiac catheterization which was 

normal..,  Patient is found to have severely elevated lipase significant 

pancreatitis.  Patient refusing hospital admission secondary to having family 

reunion sebastián this morning.  Patient will be discharged home to return to ER 

with symptoms worsen





Critical Care Time


Critical Care Time: Yes


Total Critical Care Time: 31





Disposition


Clinical Impression: 


 Chest pain, Coronary artery disease, Pancreatitis, Acute pancreatitis





Disposition: HOME SELF-CARE


Condition: Fair


Instructions (If sedation given, give patient instructions):  Chest Pain (ED), 

Pancreatitis (ED)


Is patient prescribed a controlled substance at d/c from ED?: No


Referrals: 


Nonstaff,Physician [REFERRING] - 1-2 days


Time of Disposition: 06:30

## 2023-07-01 NOTE — ED
General Adult HPI





- General


Chief complaint: Chest Pain


Stated complaint: chest pain


Time Seen by Provider: 07/01/23 20:14


Source: patient, RN notes reviewed, old records reviewed


Mode of arrival: ambulatory





- History of Present Illness


Initial comments: 





83-year-old male who presents for evaluation of epigastric pain, lower chest 

pain.  Patient was diagnosed with pancreatitis early this morning.  Patient had 

asked to be discharged from the emergency department as he was feeling 100% 

better and had a family gathering to attend.  He returns this evening with 

recurrent symptoms including vomiting, multiple episodes and epigastric pain.  

Denies diaphoresis or dyspnea.  Pain is in the lower chest and epigastrium.  No 

fever.  No history of previous pancreatitis.





- Related Data


                                Home Medications











 Medication  Instructions  Recorded  Confirmed


 


Insulin Glargine,Hum.rec.anlog 65 unit SQ HS 12/22/14 07/01/23





[Lantus Solostar Pen]   


 


Rosuvastatin Calcium [Crestor] 10 mg PO DAILY 12/22/14 07/01/23


 


allopurinoL [Zyloprim] 100 mg PO DAILY 12/22/14 07/01/23


 


sitaGLIPtin PHOS/metFORMIN HCL 1 tab PO DAILY 12/22/14 07/01/23





[Janumet 50-1,000 mg Tablet]   


 


Insulin Lispro [humaLOG Kwikpen] 10 unit SQ AC-TID 08/04/21 07/01/23


 


Apixaban [Eliquis] 5 mg PO BID 09/13/21 07/01/23


 


Tamsulosin [Flomax] 0.4 mg PO BID 05/30/22 07/01/23


 


Semaglutide [Ozempic] 0.5 mg SQ CONNORS 07/01/23 07/01/23


 


Sildenafil Citrate 100 mg PO DAILY PRN 07/01/23 07/01/23








                                  Previous Rx's











 Medication  Instructions  Recorded


 


lisinopriL [Zestril] 2.5 mg PO DAILY  tab 10/07/18











                                    Allergies











Allergy/AdvReac Type Severity Reaction Status Date / Time


 


No Known Allergies Allergy   Verified 07/01/23 20:39














Review of Systems


ROS Statement: 


Those systems with pertinent positive or pertinent negative responses have been 

documented in the HPI.





ROS Other: All systems not noted in ROS Statement are negative.





Past Medical History


Past Medical History: Atrial Flutter, Coronary Artery Disease (CAD), Cancer, Ch

est Pain / Angina, Diabetes Mellitus, Hearing Disorder / Deafness, 

Hyperlipidemia, Hypertension, Prostate Disorder, Renal Disease


Additional Past Medical History / Comment(s): BPH, bladder cancer 5/1/21, 

hearing aids


History of Any Multi-Drug Resistant Organisms: None Reported


Past Surgical History: Appendectomy, Bladder Surgery, Heart Catheterization, 

Joint Replacement, Tonsillectomy


Additional Past Surgical History / Comment(s): left knee replaced, right rotator

cuff SX, COLONOSCOPY,


Past Anesthesia/Blood Transfusion Reactions: No Reported Reaction


Past Psychological History: Depression


Smoking Status: Former smoker


Past Alcohol Use History: Occasional


Past Drug Use History: None Reported





- Past Family History


  ** Mother


Family Medical History: No Reported History


Additional Family Medical History / Comment(s): Mother had history of congenital

heart disease, CHF.





  ** Father


Family Medical History: CVA/TIA


Additional Family Medical History / Comment(s): Father contracted malaria.  

History of CVA.





  ** Brother(s)


Family Medical History: No Reported History


Additional Family Medical History / Comment(s): Brother has history of 

headaches.





  ** Sister(s)


Family Medical History: No Reported History





  ** Daughter(s)


Family Medical History: No Reported History


Additional Family Medical History / Comment(s): Patient has one daughter with no

major medical problems.





  ** Son(s)


Family Medical History: No Reported History


Additional Family Medical History / Comment(s): Patient has one son with no 

major medical problems.





General Exam


General appearance: alert, in no apparent distress


Head exam: Present: atraumatic, normocephalic


Eye exam: Present: normal appearance, PERRL


ENT exam: Present: normal exam


Neck exam: Present: normal inspection.  Absent: tenderness, meningismus


Respiratory exam: Present: normal lung sounds bilaterally.  Absent: respiratory 

distress, wheezes


Cardiovascular Exam: Present: regular rate, normal rhythm


GI/Abdominal exam: Present: soft.  Absent: distended, tenderness, guarding, 

rebound


Neurological exam: Present: alert, oriented X3, CN II-XII intact.  Absent: motor

sensory deficit


Psychiatric exam: Present: normal affect, normal mood


Skin exam: Present: warm, dry, intact.  Absent: cyanosis, diaphoretic





Course


                                   Vital Signs











  07/01/23





  20:02


 


Temperature 97.8 F


 


Pulse Rate 59 L


 


Respiratory 18





Rate 


 


Blood Pressure 117/69


 


O2 Sat by Pulse 97





Oximetry 














Medical Decision Making





- Medical Decision Making





Was pt. sent in by a medical professional or institution (KENNETH Robbins, NP, urgent 

care, hospital, or nursing home...) When possible be specific


@  -No


Did you speak to anyone other than the patient for history (EMS, parent, family,

police, friend...)? What history was obtained from this source 


@  -No


Did you review nursing and triage notes (agree or disagree)?  Why? 


@  -I reviewed and agree with nursing and triage notes


Were old charts reviewed (outside hosp., previous admission, EMS record, old 

EKG, old radiological studies, urgent care reports/EKG's, nursing home records)?

Report findings 


@  ER note from this morning


Differential Diagnosis (chest pain, altered mental status, abdominal pain women,

abdominal pain men, vaginal bleeding, weakness, fever, dyspnea, syncope, 

headache, dizziness, GI bleed, back pain, seizure, CVA, palpatations, mental 

health, musculoskeletal)? 


@  -Differential Chest Pain:


Stable Angina, Unstable Angina, STEMI, NSTEMI Aortic Dissection, Pneumothorax, 

Musculoskeletal, Esophageal Spasm GERD, Cholecystitis, Pancreatitis, Zoster, 

this is not meant to be an all-inclusive list. 


EKG interpreted by me (3pts min.).


@  -Sinus rhythm first-degree AV block, rate of 63, WI interval 239, QRS 

duration 95,  no ST segment changes.


X-rays interpreted by me (1pt min.).


@  -None done


CT interpreted by me (1pt min.).


@  CT showing mild pancreatitis


U/S interpreted by me (1pt. min.).


@  -None done


What testing was considered but not performed or refused? (CT, X-rays, U/S, 

labs)? Why?


@  -None


What meds were considered but not given or refused? Why?


@  -None


Did you discuss the management of the patient with other professionals (pr

ofessionals i.e. KENNETH Robbins, NP, lab, RT, psych nurse, , , 

teacher, , )? Give summary


@  -Dr. Christiansen


Was smoking cessation discussed for >3mins.?


@  -No


Was critical care preformed (if so, how long)?


@  -No


Were there social determinants of health that impacted care today? How? 

(Homelessness, low income, unemployed, alcoholism, drug addiction, tr

ansportation, low edu. Level, literacy, decrease access to med. care, [facility], 

rehab)?


@  -No


Was there de-escalation of care discussed even if they declined (Discuss DNR or 

withdrawal of care, Hospice)? DNR status


@  -No


What co-morbidities impacted this encounter? (DM, HTN, Smoking, COPD, CAD, 

Cancer, CVA, ARF, Chemo, Hep., AIDS, mental health diagnosis, sleep apnea, 

morbid obesity)?


@  -[Hypertension diabetes


Was patient admitted / discharged? Hospital course, mention meds given and r

oute, prescriptions, significant lab abnormalities, going to OR and other 

pertinent info.


@  -83-year-old male with recurrent epigastric pain nausea vomiting, recent 

diagnosis of pancreatitis.  Pancreatic enzymes are still elevated but are down 

trending at 1300.  CT shows mild pancreatitis.  His liver transaminases are 

normal, normal bilirubin.  I suspect a drug induced pancreatitis.  Patient will 

be admitted for IV hydration and symptomatically control.  Patient will be admit

andre to internal medicine.


Undiagnosed new problem with uncertain prognosis?


@  -No


Drug Therapy requiring intensive monitoring for toxicity (Heparin, Nitro, 

Insulin, Cardizem)?


@  -No


Were any procedures done?


@  -No


Diagnosis/symptom?


@  Pancreatitis


Acute, or Chronic, or Acute on Chronic?


@  -Acute


Uncomplicated (without systemic symptoms) or Complicated (systemic symptoms)?


@  -[Complicated


Side effects of treatment?


@  -No


Exacerbation, Progression, or Severe Exacerbation?


@  -No


Poses a threat to life or bodily function? How? (Chest pain, USA, MI, pneumonia,

 PE, COPD, DKA, ARF, appy, cholecystitis, CVA, Diverticulitis, Homicidal, 

Suicidal, threat to staff... and all critical care pts)


@  -[Yes, pancreatitis





- Lab Data


Result diagrams: 


                                 07/01/23 20:27





                                 07/01/23 20:27


                                   Lab Results











  07/01/23 07/01/23 07/01/23 Range/Units





  20:27 20:27 20:27 


 


WBC  5.2    (3.8-10.6)  k/uL


 


RBC  4.67    (4.30-5.90)  m/uL


 


Hgb  12.9 L    (13.0-17.5)  gm/dL


 


Hct  40.5    (39.0-53.0)  %


 


MCV  86.7    (80.0-100.0)  fL


 


MCH  27.7    (25.0-35.0)  pg


 


MCHC  32.0    (31.0-37.0)  g/dL


 


RDW  14.9    (11.5-15.5)  %


 


Plt Count  89 L    (150-450)  k/uL


 


MPV  9.3    


 


Neutrophils %  54    %


 


Lymphocytes %  32    %


 


Monocytes %  6    %


 


Eosinophils %  6    %


 


Basophils %  0    %


 


Neutrophils #  2.8    (1.3-7.7)  k/uL


 


Lymphocytes #  1.7    (1.0-4.8)  k/uL


 


Monocytes #  0.3    (0-1.0)  k/uL


 


Eosinophils #  0.3    (0-0.7)  k/uL


 


Basophils #  0.0    (0-0.2)  k/uL


 


Hypochromasia  Slight    


 


PT   11.0   (9.0-12.0)  sec


 


INR   1.0   (<1.2)  


 


APTT   25.8   (22.0-30.0)  sec


 


Sodium    136 L  (137-145)  mmol/L


 


Potassium    4.9  (3.5-5.1)  mmol/L


 


Chloride    107  ()  mmol/L


 


Carbon Dioxide    19 L  (22-30)  mmol/L


 


Anion Gap    10  mmol/L


 


BUN    27 H  (9-20)  mg/dL


 


Creatinine    0.97  (0.66-1.25)  mg/dL


 


Est GFR (CKD-EPI)AfAm    83  (>60 ml/min/1.73 sqM)  


 


Est GFR (CKD-EPI)NonAf    72  (>60 ml/min/1.73 sqM)  


 


Glucose    139 H  (74-99)  mg/dL


 


Calcium    8.6  (8.4-10.2)  mg/dL


 


Magnesium    1.6  (1.6-2.3)  mg/dL


 


Total Bilirubin    0.9  (0.2-1.3)  mg/dL


 


AST    99 H  (17-59)  U/L


 


ALT    38  (4-49)  U/L


 


Alkaline Phosphatase    94  ()  U/L


 


Troponin I     (0.000-0.034)  ng/mL


 


Total Protein    7.0  (6.3-8.2)  g/dL


 


Albumin    4.0  (3.5-5.0)  g/dL


 


Lipase    1368 H  ()  U/L














  07/01/23 Range/Units





  20:27 


 


WBC   (3.8-10.6)  k/uL


 


RBC   (4.30-5.90)  m/uL


 


Hgb   (13.0-17.5)  gm/dL


 


Hct   (39.0-53.0)  %


 


MCV   (80.0-100.0)  fL


 


MCH   (25.0-35.0)  pg


 


MCHC   (31.0-37.0)  g/dL


 


RDW   (11.5-15.5)  %


 


Plt Count   (150-450)  k/uL


 


MPV   


 


Neutrophils %   %


 


Lymphocytes %   %


 


Monocytes %   %


 


Eosinophils %   %


 


Basophils %   %


 


Neutrophils #   (1.3-7.7)  k/uL


 


Lymphocytes #   (1.0-4.8)  k/uL


 


Monocytes #   (0-1.0)  k/uL


 


Eosinophils #   (0-0.7)  k/uL


 


Basophils #   (0-0.2)  k/uL


 


Hypochromasia   


 


PT   (9.0-12.0)  sec


 


INR   (<1.2)  


 


APTT   (22.0-30.0)  sec


 


Sodium   (137-145)  mmol/L


 


Potassium   (3.5-5.1)  mmol/L


 


Chloride   ()  mmol/L


 


Carbon Dioxide   (22-30)  mmol/L


 


Anion Gap   mmol/L


 


BUN   (9-20)  mg/dL


 


Creatinine   (0.66-1.25)  mg/dL


 


Est GFR (CKD-EPI)AfAm   (>60 ml/min/1.73 sqM)  


 


Est GFR (CKD-EPI)NonAf   (>60 ml/min/1.73 sqM)  


 


Glucose   (74-99)  mg/dL


 


Calcium   (8.4-10.2)  mg/dL


 


Magnesium   (1.6-2.3)  mg/dL


 


Total Bilirubin   (0.2-1.3)  mg/dL


 


AST   (17-59)  U/L


 


ALT   (4-49)  U/L


 


Alkaline Phosphatase   ()  U/L


 


Troponin I  <0.012  (0.000-0.034)  ng/mL


 


Total Protein   (6.3-8.2)  g/dL


 


Albumin   (3.5-5.0)  g/dL


 


Lipase   ()  U/L














Disposition


Clinical Impression: 


 Pancreatitis





Disposition: ADMITTED AS IP TO THIS HOSP


Condition: Stable


Is patient prescribed a controlled substance at d/c from ED?: No


Referrals: 


Maco Christiansen MD [Primary Care Provider] - 1-2 days


Time of Disposition: 22:21

## 2023-07-01 NOTE — XR
EXAMINATION TYPE: XR chest 2V

 

DATE OF EXAM: 7/1/2023

 

COMPARISON: 5/30/2022

 

INDICATION: Chest pain

 

TECHNIQUE:  Frontal and lateral views of the chest are obtained.

 

FINDINGS:  

The heart size is normal.  

The pulmonary vasculature is normal.

Mild subsegmental atelectasis may be at the lung bases. 

 

IMPRESSION:  

1. Clinical correlation for mild subsegmental atelectasis bilateral lung bases.

## 2023-07-01 NOTE — CT
EXAMINATION TYPE: CT abdomen pelvis w con

 

DATE OF EXAM: 7/1/2023

 

COMPARISON: 5/19/2021

 

INDICATION: epigastric pain

 

DLP: 1596.9 mGycm, Automated exposure control for dose reduction was used.

 

CONTRAST:  100 mL of Isovue 300. 

                        Study performed without Oral Contrast

 

TECHNIQUE: Axial images were obtained from above the diaphragm to the pubic rami in the axial plane a
t 5 mm thick sections.  Reconstructed images are reviewed on the computer in the coronal plane.  

 

FINDINGS:

 

Limited CT sections are obtained the lung bases.  The lung bases are clear.  Coronary artery calcific
ation is present

 

CT ABDOMEN:

 

Liver: Normal

 

Spleen: Splenomegaly measuring 16.9 cm is present. Normal less than 12.5 cm.

 

Pancreas: There may be some vague inflammatory changes within the mesentery adjacent to the pancreati
c head and proximal body. Consider mild pancreatitis. No pseudocyst formation or masses are identifie
d

 

Adrenal glands: The adrenal glands are normal.

 

Gallbladder: Normal  

 

Kidneys: No masses are evident. No hydronephrosis is present.   No cysts are present.  Delayed images
 were obtained through the kidneys, which remain unremarkable.

 

Aorta: Vascular calcification is within the aorta. 

 

Inferior vena cava: Normal.

 

CT PELVIS: Mesenteric fat-containing left inguinal hernia without loops of bowel appears to be presen
t.

Loops of bowel within the abdomen and pelvis are normal.     Study is without oral contrast limiting 
bowel evaluation. Fluid-filled small bowel loops are present. Consider some mild ileus

 

Appendix: Not visualized. No dilated tubular structure or inflammatory changes are evident.

 

Urinary bladder: Normal. 

 

Genitourinary structures: Prostate is prominent.

 

Osseous structures: No suspicious lytic or sclerotic lesions. 

 

There is a 2.2 x 3.7 oval hypodensity within the lower right hemipelvis. A large lymph node may be pr
esent.

 

IMPRESSIONS:

1.  Splenomegaly.

2. Inflammatory changes adjacent to the head and proximal body of the pancreas. Correlate for mild pa
ncreatitis.

3. Mild small bowel ileus.

4. Enlarged oval mass, possible lymph node, inferior right hemipelvis.

5. Left inguinal hernia

## 2023-07-02 LAB
ALBUMIN SERPL-MCNC: 3.4 G/DL (ref 3.5–5)
ALP SERPL-CCNC: 98 U/L (ref 38–126)
ALT SERPL-CCNC: 40 U/L (ref 4–49)
ANION GAP SERPL CALC-SCNC: 7 MMOL/L
AST SERPL-CCNC: 72 U/L (ref 17–59)
BASOPHILS # BLD AUTO: 0 K/UL (ref 0–0.2)
BASOPHILS NFR BLD AUTO: 0 %
BUN SERPL-SCNC: 24 MG/DL (ref 9–20)
CALCIUM SPEC-MCNC: 8.3 MG/DL (ref 8.4–10.2)
CHLORIDE SERPL-SCNC: 114 MMOL/L (ref 98–107)
CHOLEST SERPL-MCNC: 107 MG/DL (ref 0–200)
CO2 SERPL-SCNC: 20 MMOL/L (ref 22–30)
EOSINOPHIL # BLD AUTO: 0.3 K/UL (ref 0–0.7)
EOSINOPHIL NFR BLD AUTO: 6 %
ERYTHROCYTE [DISTWIDTH] IN BLOOD BY AUTOMATED COUNT: 4.3 M/UL (ref 4.3–5.9)
ERYTHROCYTE [DISTWIDTH] IN BLOOD: 14.8 % (ref 11.5–15.5)
GLUCOSE BLD-MCNC: 108 MG/DL (ref 70–110)
GLUCOSE BLD-MCNC: 140 MG/DL (ref 70–110)
GLUCOSE BLD-MCNC: 140 MG/DL (ref 70–110)
GLUCOSE BLD-MCNC: 199 MG/DL (ref 70–110)
GLUCOSE BLD-MCNC: 59 MG/DL (ref 70–110)
GLUCOSE SERPL-MCNC: 109 MG/DL (ref 74–99)
HCT VFR BLD AUTO: 36.8 % (ref 39–53)
HDLC SERPL-MCNC: 45 MG/DL (ref 40–60)
HGB BLD-MCNC: 11.7 GM/DL (ref 13–17.5)
LDLC SERPL CALC-MCNC: 45.3 MG/DL (ref 0–131)
LIPASE SERPL-CCNC: 389 U/L (ref 23–300)
LYMPHOCYTES # SPEC AUTO: 1.5 K/UL (ref 1–4.8)
LYMPHOCYTES NFR SPEC AUTO: 35 %
MCH RBC QN AUTO: 27.3 PG (ref 25–35)
MCHC RBC AUTO-ENTMCNC: 32 G/DL (ref 31–37)
MCV RBC AUTO: 85.5 FL (ref 80–100)
MONOCYTES # BLD AUTO: 0.3 K/UL (ref 0–1)
MONOCYTES NFR BLD AUTO: 7 %
NEUTROPHILS # BLD AUTO: 2.1 K/UL (ref 1.3–7.7)
NEUTROPHILS NFR BLD AUTO: 49 %
PLATELET # BLD AUTO: 77 K/UL (ref 150–450)
POTASSIUM SERPL-SCNC: 4.6 MMOL/L (ref 3.5–5.1)
PROT SERPL-MCNC: 6.1 G/DL (ref 6.3–8.2)
SODIUM SERPL-SCNC: 141 MMOL/L (ref 137–145)
TRIGL SERPL-MCNC: 83.4 MG/DL (ref 0–149)
VLDLC SERPL CALC-MCNC: 16.68 MG/DL (ref 5–40)
WBC # BLD AUTO: 4.4 K/UL (ref 3.8–10.6)

## 2023-07-02 RX ADMIN — INSULIN ASPART SCH: 100 INJECTION, SOLUTION INTRAVENOUS; SUBCUTANEOUS at 17:31

## 2023-07-02 RX ADMIN — INSULIN ASPART SCH UNIT: 100 INJECTION, SOLUTION INTRAVENOUS; SUBCUTANEOUS at 13:05

## 2023-07-02 RX ADMIN — INSULIN ASPART SCH: 100 INJECTION, SOLUTION INTRAVENOUS; SUBCUTANEOUS at 17:30

## 2023-07-02 RX ADMIN — INSULIN DETEMIR SCH UNIT: 100 INJECTION, SOLUTION SUBCUTANEOUS at 21:09

## 2023-07-02 RX ADMIN — TAMSULOSIN HYDROCHLORIDE SCH MG: 0.4 CAPSULE ORAL at 11:12

## 2023-07-02 RX ADMIN — CEFAZOLIN SCH MLS/HR: 330 INJECTION, POWDER, FOR SOLUTION INTRAMUSCULAR; INTRAVENOUS at 01:03

## 2023-07-02 RX ADMIN — APIXABAN SCH: 5 TABLET, FILM COATED ORAL at 21:11

## 2023-07-02 RX ADMIN — APIXABAN SCH: 5 TABLET, FILM COATED ORAL at 10:58

## 2023-07-02 RX ADMIN — PANTOPRAZOLE SODIUM SCH MG: 40 INJECTION, POWDER, FOR SOLUTION INTRAVENOUS at 11:12

## 2023-07-02 RX ADMIN — CEFAZOLIN SCH MLS/HR: 330 INJECTION, POWDER, FOR SOLUTION INTRAMUSCULAR; INTRAVENOUS at 11:13

## 2023-07-02 RX ADMIN — TAMSULOSIN HYDROCHLORIDE SCH MG: 0.4 CAPSULE ORAL at 21:10

## 2023-07-02 RX ADMIN — INSULIN ASPART SCH: 100 INJECTION, SOLUTION INTRAVENOUS; SUBCUTANEOUS at 21:11

## 2023-07-02 RX ADMIN — INSULIN ASPART SCH: 100 INJECTION, SOLUTION INTRAVENOUS; SUBCUTANEOUS at 07:25

## 2023-07-02 NOTE — P.HPIM
History of Present Illness


H&P Date: 07/02/23


History of present illness; patient is 83-year-old gentleman with past medical 

history significant for insulin-dependent diabetes mellitus, hypertension who 

presented to the ER because of epigastric pain.  Patient was initially in the ER

yesterday and was diagnosed with pancreatitis but decided to leave from the ER 

as he had some family gathering to attend.  Patient came back later in the 

evening.  Worsening abdominal pain, patient also having nausea and vomiting.  

Patient currently on  sitagliptin and  ozempic both drugs associated with 

increased incidence of pancreatitis.


Initial lab work done in the ER showed WBC 5.2, hemoglobin 12.9, platelet count 

89, sodium 130s, potassium 4.9, BUN 27, creatinine 0.97, lipase 1368


CT abdomen pelvis done showed splenomegaly, inflammatory changes adjacent to the

head and proximal body of the pancreas, mild prostatitis.  Mild small bowel 

ileus


Patient was admitted to medicine service








REVIEW OF SYSTEMS: 


CONSTITUTIONAL: No fever, no malaise, no fatigue. 


HEENT: No recent visual problems or hearing problems. Denied any sore throat. 


CARDIOVASCULAR: No chest pain, orthopnea, PND, no palpitations, no syncope. 


PULMONARY: No shortness of breath, no cough, no hemoptysis. 


GASTROINTESTINAL: As mentioned in HPI


NEUROLOGICAL: No headaches, no weakness, no numbness. 


HEMATOLOGICAL: Denies any bleeding or petechiae. 


GENITOURINARY: Denies any burning micturition, frequency, or urgency. 


MUSCULOSKELETAL/RHEUMATOLOGICAL: Denies any joint pain, swelling, or any muscle 

pain. 


ENDOCRINE: Denies any polyuria or polydipsia. 





The rest of the 14-point review of systems is negative.











PHYSICAL EXAMINATION: 





GENERAL: The patient is alert and oriented x3, not in any acute distress. Well 

developed, well nourished. 


HEENT: Pupils are round and equally reacting to light. EOMI. No scleral icterus.

No conjunctival pallor. Normocephalic, atraumatic. No pharyngeal erythema. No 

thyromegaly. 


CARDIOVASCULAR: S1 and S2 present. No murmurs, rubs, or gallops. 


PULMONARY: Chest is clear to auscultation, no wheezing or crackles. 


ABDOMEN: Soft, nontender, nondistended, normoactive bowel sounds. No palpable 

organomegaly. 


MUSCULOSKELETAL: No joint swelling or deformity.


EXTREMITIES: No cyanosis, clubbing, or pedal edema. 


NEUROLOGICAL: Gross neurological examination did not reveal any focal deficits. 


SKIN: No rashes. 





Assessment and plan


Acute pancreatitis could be secondary to sitagliptin or ozempic both drugs 

associated with increased incidence of pancreatitis


Thrombocytopenia


Splenomegaly


Hypertension


Hyperlipidemia


Insulin-dependent diabetes mellitus








Monitor CBC


Monitor CMP


Check lipid panel 


continue IV fluids


Continue antiemetics


Continue pain management


Monitor blood sugar levels


Resume Lantus 20 units twice a day and sliding scale insulin


Hold oral hypoglycemics 


Resume home meds


Consult hematology for thrombocytopenia








Past Medical History


Past Medical History: Atrial Flutter, Coronary Artery Disease (CAD), Cancer, 

Chest Pain / Angina, Diabetes Mellitus, Hearing Disorder / Deafness, 

Hyperlipidemia, Hypertension, Prostate Disorder, Renal Disease


Additional Past Medical History / Comment(s): BPH, bladder cancer 5/1/21, 

hearing aids


History of Any Multi-Drug Resistant Organisms: None Reported


Past Surgical History: Appendectomy, Bladder Surgery, Heart Catheterization, 

Joint Replacement, Tonsillectomy


Additional Past Surgical History / Comment(s): left knee replaced, right rotator

cuff SX, COLONOSCOPY,


Past Anesthesia/Blood Transfusion Reactions: No Reported Reaction


Past Psychological History: Depression


Smoking Status: Former smoker


Past Alcohol Use History: Occasional


Additional Past Alcohol Use History / Comment(s): Smoked, up to 2 ppd, QUIT 

SMOKING 1981.


Past Drug Use History: None Reported





- Past Family History


  ** Mother


Family Medical History: No Reported History


Additional Family Medical History / Comment(s): Mother had history of congenital

heart disease, CHF.





  ** Father


Family Medical History: CVA/TIA


Additional Family Medical History / Comment(s): Father contracted malaria.  

History of CVA.





  ** Brother(s)


Family Medical History: No Reported History


Additional Family Medical History / Comment(s): Brother has history of 

headaches.





  ** Sister(s)


Family Medical History: No Reported History





  ** Daughter(s)


Family Medical History: No Reported History


Additional Family Medical History / Comment(s): Patient has one daughter with no

major medical problems.





  ** Son(s)


Family Medical History: No Reported History


Additional Family Medical History / Comment(s): Patient has one son with no 

major medical problems.





Medications and Allergies


                                Home Medications











 Medication  Instructions  Recorded  Confirmed  Type


 


Insulin Glargine,Hum.rec.anlog 65 unit SQ HS 12/22/14 07/01/23 History





[Lantus Solostar Pen]    


 


Rosuvastatin Calcium [Crestor] 10 mg PO DAILY 12/22/14 07/01/23 History


 


allopurinoL [Zyloprim] 100 mg PO DAILY 12/22/14 07/01/23 History


 


sitaGLIPtin PHOS/metFORMIN HCL 1 tab PO DAILY 12/22/14 07/01/23 History





[Janumet 50-1,000 mg Tablet]    


 


lisinopriL [Zestril] 2.5 mg PO DAILY  tab 10/07/18 07/01/23 Rx


 


Insulin Lispro [humaLOG Kwikpen] 10 unit SQ AC-TID 08/04/21 07/01/23 History


 


Apixaban [Eliquis] 5 mg PO BID 09/13/21 07/01/23 History


 


Tamsulosin [Flomax] 0.4 mg PO BID 05/30/22 07/01/23 History


 


Semaglutide [Ozempic] 0.5 mg SQ CONNORS 07/01/23 07/01/23 History


 


Sildenafil Citrate 100 mg PO DAILY PRN 07/01/23 07/01/23 History








                                    Allergies











Allergy/AdvReac Type Severity Reaction Status Date / Time


 


No Known Allergies Allergy   Verified 07/01/23 20:39














Physical Exam


Vitals: 


                                   Vital Signs











  Temp Pulse Pulse Resp BP BP Pulse Ox


 


 07/02/23 08:36        96


 


 07/02/23 07:57  97.9 F   55 L  18   137/69  96


 


 07/02/23 02:55  97.8 F   65  19   104/61  100


 


 07/02/23 02:12   56 L   16  102/77   97


 


 07/02/23 01:09   83   16  117/83   97


 


 07/01/23 20:02  97.8 F  59 L   18  117/69   97








                                Intake and Output











 07/01/23 07/02/23 07/02/23





 22:59 06:59 14:59


 


Other:   


 


  # Voids  1 


 


  Weight 99.79 kg 99.79 kg 














Results


CBC & Chem 7: 


                                 07/02/23 05:22





                                 07/02/23 05:22


Labs: 


                  Abnormal Lab Results - Last 24 Hours (Table)











  07/01/23 07/01/23 07/02/23 Range/Units





  20:27 20:27 05:22 


 


Hgb  12.9 L   11.7 L  (13.0-17.5)  gm/dL


 


Hct    36.8 L  (39.0-53.0)  %


 


Plt Count  89 L   77 L  (150-450)  k/uL


 


Sodium   136 L   (137-145)  mmol/L


 


Chloride     ()  mmol/L


 


Carbon Dioxide   19 L   (22-30)  mmol/L


 


BUN   27 H   (9-20)  mg/dL


 


Glucose   139 H   (74-99)  mg/dL


 


Calcium     (8.4-10.2)  mg/dL


 


AST   99 H   (17-59)  U/L


 


Total Protein     (6.3-8.2)  g/dL


 


Albumin     (3.5-5.0)  g/dL


 


Lipase   1368 H   ()  U/L














  07/02/23 Range/Units





  05:22 


 


Hgb   (13.0-17.5)  gm/dL


 


Hct   (39.0-53.0)  %


 


Plt Count   (150-450)  k/uL


 


Sodium   (137-145)  mmol/L


 


Chloride  114 H  ()  mmol/L


 


Carbon Dioxide  20 L  (22-30)  mmol/L


 


BUN  24 H  (9-20)  mg/dL


 


Glucose  109 H  (74-99)  mg/dL


 


Calcium  8.3 L  (8.4-10.2)  mg/dL


 


AST  72 H  (17-59)  U/L


 


Total Protein  6.1 L  (6.3-8.2)  g/dL


 


Albumin  3.4 L  (3.5-5.0)  g/dL


 


Lipase  389 H  ()  U/L














Thrombosis Risk Factor Assmnt





- Choose All That Apply


Each Risk Factor Represents 3 Points: Age 75 years or older


Thrombosis Risk Factor Assessment Total Risk Factor Score: 3


Thrombosis Risk Factor Assessment Level: Moderate Risk

## 2023-07-02 NOTE — P.CONS
History of Present Illness





- Reason for Consult


Consult date: 07/02/23


Thrombo-cytopenia, splenomegaly





- History of Present Illness





  The patient is a 83-year-old white male, who was admitted to the hospital for 

upper abdominal/lower chest pain, associated with nausea, vomiting and decreased

appetite.  He was found to have acute pancreatitis, and has improved 

significantly with supportive care.  On admission his platelets were found to be

in the 70-80,000 range, with CT of the abdomen and pelvis showing splenomegaly. 

Consult was therefore placed for further evaluation and recommendations.


  The patient was previously evaluated by myself in the office in 7/19 for mild 

thrombocytopenia, in the low 100 range.  His workup revealed that this was due 

to chronic liver disease.  Dedicated ultrasound of the liver and spleen had 

shown changes in the hepatic parenchyma consistent with steatosis, and 

splenomegaly.


  The patient denies any changes in lifestyle, specifically any significant a

lcohol use, since his evaluation in 7/19.  He has not had any unusual bleeding 

or bruising, and denies any other blood related issues.





Review of Systems


Constitutional: Reports anorexia, Reports fatigue


Eyes: denies blurred vision, denies pain


Ears: deny: decreased hearing, ear discharge, earache, tinnitus


Ears, nose, mouth and throat: Denies headache, Denies sore throat


Cardiovascular: Denies chest pain, Denies shortness of breath


Respiratory: Denies cough


Gastrointestinal: Reports abdominal pain, Reports bloating, Reports nausea, 

Reports vomiting


Genitourinary: Reports as per HPI


Musculoskeletal: Reports muscle weakness


Integumentary: Denies pruritus, Denies rash


Neurological: Reports weakness


Psychiatric: Denies anxiety, Denies depression


Endocrine: Reports fatigue


Hematologic/Lymphatic: Reports as per HPI





Past Medical History


Past Medical History: Atrial Flutter, Coronary Artery Disease (CAD), Cancer, 

Chest Pain / Angina, Diabetes Mellitus, Hearing Disorder / Deafness, Hyperli

pidemia, Hypertension, Prostate Disorder, Renal Disease


Additional Past Medical History / Comment(s): BPH, bladder cancer 5/1/21, 

hearing aids


History of Any Multi-Drug Resistant Organisms: None Reported


Past Surgical History: Appendectomy, Bladder Surgery, Heart Catheterization, 

Joint Replacement, Tonsillectomy


Additional Past Surgical History / Comment(s): left knee replaced, right rotator

cuff SX, COLONOSCOPY,


Past Anesthesia/Blood Transfusion Reactions: No Reported Reaction


Past Psychological History: Depression


Smoking Status: Former smoker


Past Alcohol Use History: Occasional


Additional Past Alcohol Use History / Comment(s): Smoked, up to 2 ppd, QUIT 

SMOKING 1981.


Past Drug Use History: None Reported





- Past Family History


  ** Mother


Family Medical History: No Reported History


Additional Family Medical History / Comment(s): Mother had history of congenital

heart disease, CHF.





  ** Father


Family Medical History: CVA/TIA


Additional Family Medical History / Comment(s): Father contracted malaria.  

History of CVA.





  ** Brother(s)


Family Medical History: No Reported History


Additional Family Medical History / Comment(s): Brother has history of 

headaches.





  ** Sister(s)


Family Medical History: No Reported History





  ** Daughter(s)


Family Medical History: No Reported History


Additional Family Medical History / Comment(s): Patient has one daughter with no

major medical problems.





  ** Son(s)


Family Medical History: No Reported History


Additional Family Medical History / Comment(s): Patient has one son with no 

major medical problems.





Medications and Allergies


                                Home Medications











 Medication  Instructions  Recorded  Confirmed  Type


 


Insulin Glargine,Hum.rec.anlog 65 unit SQ HS 12/22/14 07/01/23 History





[Lantus Solostar Pen]    


 


Rosuvastatin Calcium [Crestor] 10 mg PO DAILY 12/22/14 07/01/23 History


 


allopurinoL [Zyloprim] 100 mg PO DAILY 12/22/14 07/01/23 History


 


sitaGLIPtin PHOS/metFORMIN HCL 1 tab PO DAILY 12/22/14 07/01/23 History





[Janumet 50-1,000 mg Tablet]    


 


lisinopriL [Zestril] 2.5 mg PO DAILY  tab 10/07/18 07/01/23 Rx


 


Insulin Lispro [humaLOG Kwikpen] 10 unit SQ AC-TID 08/04/21 07/01/23 History


 


Apixaban [Eliquis] 5 mg PO BID 09/13/21 07/01/23 History


 


Tamsulosin [Flomax] 0.4 mg PO BID 05/30/22 07/01/23 History


 


Semaglutide [Ozempic] 0.5 mg SQ CONNORS 07/01/23 07/01/23 History


 


Sildenafil Citrate 100 mg PO DAILY PRN 07/01/23 07/01/23 History








                                    Allergies











Allergy/AdvReac Type Severity Reaction Status Date / Time


 


No Known Allergies Allergy   Verified 07/01/23 20:39














Physical Exam


Vitals: 


                                   Vital Signs











  Temp Pulse Pulse Resp BP BP Pulse Ox


 


 07/02/23 08:36        96


 


 07/02/23 07:57  97.9 F   55 L  18   137/69  96


 


 07/02/23 02:55  97.8 F   65  19   104/61  100


 


 07/02/23 02:12   56 L   16  102/77   97


 


 07/02/23 01:09   83   16  117/83   97


 


 07/01/23 20:02  97.8 F  59 L   18  117/69   97








                                Intake and Output











 07/01/23 07/02/23 07/02/23





 22:59 06:59 14:59


 


Other:   


 


  # Voids  1 


 


  Weight 99.79 kg 99.79 kg 














- Constitutional


General appearance: no acute distress





- EENT


Eyes: EOMI, PERRLA


ENT: hearing grossly normal, normal oropharynx





- Neck


Neck: no lymphadenopathy


Thyroid: bilateral: normal size





- Respiratory


Respiratory: bilateral: CTA





- Cardiovascular


Rhythm: regular


Heart sounds: normal: S1, S2





- Gastrointestinal


General gastrointestinal: normal bowel sounds, soft





- Integumentary


Integumentary: normal





- Neurologic


Neurologic: CNII-XII intact





- Musculoskeletal


Musculoskeletal: strength equal bilaterally





- Psychiatric


Psychiatric: A&O x's 3, appropriate affect





Results


CBC & Chem 7: 


                                 07/02/23 05:22





                                 07/02/23 05:22


Labs: 


                  Abnormal Lab Results - Last 24 Hours (Table)











  07/01/23 07/01/23 07/02/23 Range/Units





  20:27 20:27 05:22 


 


Hgb  12.9 L   11.7 L  (13.0-17.5)  gm/dL


 


Hct    36.8 L  (39.0-53.0)  %


 


Plt Count  89 L   77 L  (150-450)  k/uL


 


Sodium   136 L   (137-145)  mmol/L


 


Chloride     ()  mmol/L


 


Carbon Dioxide   19 L   (22-30)  mmol/L


 


BUN   27 H   (9-20)  mg/dL


 


Glucose   139 H   (74-99)  mg/dL


 


POC Glucose (mg/dL)     ()  mg/dL


 


Calcium     (8.4-10.2)  mg/dL


 


AST   99 H   (17-59)  U/L


 


Total Protein     (6.3-8.2)  g/dL


 


Albumin     (3.5-5.0)  g/dL


 


Lipase   1368 H   ()  U/L














  07/02/23 07/02/23 Range/Units





  05:22 11:43 


 


Hgb    (13.0-17.5)  gm/dL


 


Hct    (39.0-53.0)  %


 


Plt Count    (150-450)  k/uL


 


Sodium    (137-145)  mmol/L


 


Chloride  114 H   ()  mmol/L


 


Carbon Dioxide  20 L   (22-30)  mmol/L


 


BUN  24 H   (9-20)  mg/dL


 


Glucose  109 H   (74-99)  mg/dL


 


POC Glucose (mg/dL)   199 H  ()  mg/dL


 


Calcium  8.3 L   (8.4-10.2)  mg/dL


 


AST  72 H   (17-59)  U/L


 


Total Protein  6.1 L   (6.3-8.2)  g/dL


 


Albumin  3.4 L   (3.5-5.0)  g/dL


 


Lipase  389 H   ()  U/L











Comments: 





EKG report and images reviewed


CT scan - abdomen: report reviewed


CT scan - pelvis: report reviewed





Assessment and Plan


(1) Thrombocytopenia


Narrative/Plan: 


  This has been an ongoing problem, that was evaluated by myself in 2019.  At 

that time platelet count was in the 100-120,000 range.  Workup revealed that 

this was secondary to chronic liver disease, probably due to hepatic steatosis 

and resultant splenomegaly.  This was confirmed on dedicated ultrasound.


- The current computed tomography scan again notes splenomegaly, which, as 

noted, has been present previously.  The liver is noted as normal, but dedicated

 ultrasound of the liver in 2019 had shown parenchymal changes.


- The current drop in the platelet count from baseline is likely due to the 

added stress of acute illness from his pancreatitis.  It is anticipated that his

 stated counts will return to his baseline, as his acute condition resolves.


- Check coags and deficiency states to rule out other causes.


Current Visit: Yes   Status: Acute   Code(s): D69.6 - THROMBOCYTOPENIA, 

UNSPECIFIED   SNOMED Code(s): 692242391


   


Plan: 





Defer to the admitting service for management of his other medical problems. Detail Level: Zone Additional Notes: will start aldara in about 2 wks.  he will d/c his otc differin.  \\nI want to see him back in about 2 months to recheck.  \\nreviewed no shaving again today. Render Risk Assessment In Note?: no

## 2023-07-03 VITALS — HEART RATE: 63 BPM | DIASTOLIC BLOOD PRESSURE: 65 MMHG | SYSTOLIC BLOOD PRESSURE: 144 MMHG

## 2023-07-03 VITALS — RESPIRATION RATE: 18 BRPM | TEMPERATURE: 97.5 F

## 2023-07-03 LAB
ALBUMIN SERPL-MCNC: 4 G/DL (ref 3.5–5)
ALBUMIN/GLOB SERPL: 1.4 {RATIO}
ALP SERPL-CCNC: 87 U/L (ref 38–126)
ALT SERPL-CCNC: 35 U/L (ref 4–49)
ANION GAP SERPL CALC-SCNC: 8 MMOL/L
AST SERPL-CCNC: 46 U/L (ref 17–59)
BASOPHILS # BLD AUTO: 0 K/UL (ref 0–0.2)
BASOPHILS NFR BLD AUTO: 0 %
BUN SERPL-SCNC: 13 MG/DL (ref 9–20)
CALCIUM SPEC-MCNC: 9.1 MG/DL (ref 8.4–10.2)
CHLORIDE SERPL-SCNC: 113 MMOL/L (ref 98–107)
CO2 SERPL-SCNC: 23 MMOL/L (ref 22–30)
EOSINOPHIL # BLD AUTO: 0.2 K/UL (ref 0–0.7)
EOSINOPHIL NFR BLD AUTO: 6 %
ERYTHROCYTE [DISTWIDTH] IN BLOOD BY AUTOMATED COUNT: 4.73 M/UL (ref 4.3–5.9)
ERYTHROCYTE [DISTWIDTH] IN BLOOD: 14.7 % (ref 11.5–15.5)
GLOBULIN SER CALC-MCNC: 2.8 G/DL
GLUCOSE BLD-MCNC: 111 MG/DL (ref 70–110)
GLUCOSE BLD-MCNC: 72 MG/DL (ref 70–110)
GLUCOSE SERPL-MCNC: 75 MG/DL (ref 74–99)
HCT VFR BLD AUTO: 41.6 % (ref 39–53)
HGB BLD-MCNC: 13.2 GM/DL (ref 13–17.5)
LYMPHOCYTES # SPEC AUTO: 1.3 K/UL (ref 1–4.8)
LYMPHOCYTES NFR SPEC AUTO: 36 %
MCH RBC QN AUTO: 28 PG (ref 25–35)
MCHC RBC AUTO-ENTMCNC: 31.8 G/DL (ref 31–37)
MCV RBC AUTO: 88 FL (ref 80–100)
MONOCYTES # BLD AUTO: 0.2 K/UL (ref 0–1)
MONOCYTES NFR BLD AUTO: 6 %
NEUTROPHILS # BLD AUTO: 1.9 K/UL (ref 1.3–7.7)
NEUTROPHILS NFR BLD AUTO: 51 %
PLATELET # BLD AUTO: 93 K/UL (ref 150–450)
POTASSIUM SERPL-SCNC: 4.6 MMOL/L (ref 3.5–5.1)
PROT SERPL-MCNC: 6.8 G/DL (ref 6.3–8.2)
SODIUM SERPL-SCNC: 144 MMOL/L (ref 137–145)
WBC # BLD AUTO: 3.7 K/UL (ref 3.8–10.6)

## 2023-07-03 RX ADMIN — INSULIN ASPART SCH: 100 INJECTION, SOLUTION INTRAVENOUS; SUBCUTANEOUS at 06:56

## 2023-07-03 RX ADMIN — INSULIN ASPART SCH UNIT: 100 INJECTION, SOLUTION INTRAVENOUS; SUBCUTANEOUS at 06:55

## 2023-07-03 RX ADMIN — INSULIN ASPART SCH: 100 INJECTION, SOLUTION INTRAVENOUS; SUBCUTANEOUS at 11:45

## 2023-07-03 RX ADMIN — TAMSULOSIN HYDROCHLORIDE SCH MG: 0.4 CAPSULE ORAL at 09:20

## 2023-07-03 RX ADMIN — INSULIN ASPART SCH: 100 INJECTION, SOLUTION INTRAVENOUS; SUBCUTANEOUS at 11:46

## 2023-07-03 RX ADMIN — INSULIN DETEMIR SCH UNIT: 100 INJECTION, SOLUTION SUBCUTANEOUS at 06:55

## 2023-07-03 RX ADMIN — PANTOPRAZOLE SODIUM SCH MG: 40 INJECTION, POWDER, FOR SOLUTION INTRAVENOUS at 09:20

## 2023-07-03 RX ADMIN — APIXABAN SCH: 5 TABLET, FILM COATED ORAL at 09:20

## 2023-07-03 RX ADMIN — CEFAZOLIN SCH MLS/HR: 330 INJECTION, POWDER, FOR SOLUTION INTRAMUSCULAR; INTRAVENOUS at 02:38

## 2023-07-03 NOTE — P.DS
Providers


Date of admission: 


07/01/23 22:17





Expected date of discharge: 07/03/23


Attending physician: 


Maco Christiansen





Consults: 





                                        





07/02/23 09:29


Consult Physician Urgent 


   Consulting Provider: Geoff Trujillo


   Consult Reason/Comments: Thrombocytopenia, splenomegaly


   Do you want consulting provider notified?: Yes











Primary care physician: 


Maco Christiansen





Hospital Course: 





Discharge diagnoses;


Acute pancreatitis could be secondary to sitagliptin or ozempic both drugs 

associated with increased incidence of pancreatitis


Thrombocytopenia


Splenomegaly


Hypertension


Hyperlipidemia


Insulin-dependent diabetes mellitus








Hospital course;


 patient is 83-year-old gentleman with past medical history significant for 

insulin-dependent diabetes mellitus, hypertension who presented to the ER 

because of epigastric pain.  Patient was initially in the ER yesterday and was 

diagnosed with pancreatitis but decided to leave from the ER as he had some 

family gathering to attend.  Patient came back later in the evening.  Worsening 

abdominal pain, patient also having nausea and vomiting.  Patient currently on  

sitagliptin and  ozempic both drugs associated with increased incidence of 

pancreatitis.


Initial lab work done in the ER showed WBC 5.2, hemoglobin 12.9, platelet count 

89, sodium 130s, potassium 4.9, BUN 27, creatinine 0.97, lipase 1368


CT abdomen pelvis done showed splenomegaly, inflammatory changes adjacent to the

head and proximal body of the pancreas, mild prostatitis.  Mild small bowel 

ileus


Patient was admitted to medicine service





 7/3.  Patient seen and examined.  Abdominal pain improved, tolerating diet.  

Told the patient to stop taking Januvia and ozempic.


Hematology oncology evaluated the patient for thrombocytopenia, Recommended 

Outpatient Follow-Up 





PHYSICAL EXAMINATION: 





GENERAL: The patient is alert and oriented x3, not in any acute distress. Well 

developed, well nourished. 


HEENT: Pupils are round and equally reacting to light. EOMI. No scleral icterus.

No conjunctival pallor. Normocephalic, atraumatic. No pharyngeal erythema. No 

thyromegaly. 


CARDIOVASCULAR: S1 and S2 present. No murmurs, rubs, or gallops. 


PULMONARY: Chest is clear to auscultation, no wheezing or crackles. 


ABDOMEN: Soft, nontender, nondistended, normoactive bowel sounds. No palpable 

organomegaly. 


MUSCULOSKELETAL: No joint swelling or deformity.


EXTREMITIES: No cyanosis, clubbing, or pedal edema. 


NEUROLOGICAL: Gross neurological examination did not reveal any focal deficits. 


SKIN: No rashes. 





Patient Condition at Discharge: Stable





Plan - Discharge Summary


Discharge Rx Participant: No


New Discharge Prescriptions: 


Continue


   Rosuvastatin Calcium [Crestor] 10 mg PO DAILY


   allopurinoL [Zyloprim] 100 mg PO DAILY


   Insulin Glargine,Hum.rec.anlog [Lantus Solostar Pen] 65 unit SQ HS


   lisinopriL [Zestril] 2.5 mg PO DAILY  tab


   Apixaban [Eliquis] 5 mg PO BID


   Tamsulosin [Flomax] 0.4 mg PO BID


   Sildenafil Citrate 100 mg PO DAILY PRN


     PRN Reason: E.D.


   Insulin Lispro [humaLOG Kwikpen] 10 unit SQ AC-TID





Discontinued


   sitaGLIPtin PHOS/metFORMIN HCL [Janumet 50-1,000 mg Tablet] 1 tab PO DAILY


   Semaglutide [Ozempic] 0.5 mg SQ CONNORS


Discharge Medication List





Insulin Glargine,Hum.rec.anlog [Lantus Solostar Pen] 65 unit SQ HS 12/22/14 

[History]


Rosuvastatin Calcium [Crestor] 10 mg PO DAILY 12/22/14 [History]


allopurinoL [Zyloprim] 100 mg PO DAILY 12/22/14 [History]


lisinopriL [Zestril] 2.5 mg PO DAILY  tab 10/07/18 [Rx]


Insulin Lispro [humaLOG Kwikpen] 10 unit SQ AC-TID 08/04/21 [History]


Apixaban [Eliquis] 5 mg PO BID 09/13/21 [History]


Tamsulosin [Flomax] 0.4 mg PO BID 05/30/22 [History]


Sildenafil Citrate 100 mg PO DAILY PRN 07/01/23 [History]








Follow up Appointment(s)/Referral(s): 


Maco Christiansen MD [Primary Care Provider] - 1-2 days


Discharge Disposition: HOME SELF-CARE

## 2024-08-21 ENCOUNTER — HOSPITAL ENCOUNTER (EMERGENCY)
Dept: HOSPITAL 47 - EC | Age: 84
LOS: 1 days | Discharge: HOME | End: 2024-08-22
Payer: MEDICARE

## 2024-08-21 PROCEDURE — 71046 X-RAY EXAM CHEST 2 VIEWS: CPT

## 2024-08-21 PROCEDURE — 99284 EMERGENCY DEPT VISIT MOD MDM: CPT

## 2024-08-21 PROCEDURE — 96360 HYDRATION IV INFUSION INIT: CPT

## 2024-09-05 ENCOUNTER — HOSPITAL ENCOUNTER (OUTPATIENT)
Dept: HOSPITAL 47 - RADCTMAIN | Age: 84
Discharge: HOME | End: 2024-09-05
Attending: UROLOGY
Payer: MEDICARE

## 2024-09-05 LAB — BUN SERPL-SCNC: 26 MG/DL (ref 9–20)

## 2024-09-05 PROCEDURE — 84520 ASSAY OF UREA NITROGEN: CPT

## 2024-09-05 PROCEDURE — 36415 COLL VENOUS BLD VENIPUNCTURE: CPT

## 2024-09-05 PROCEDURE — 82565 ASSAY OF CREATININE: CPT

## 2024-09-05 PROCEDURE — 74177 CT ABD & PELVIS W/CONTRAST: CPT

## 2024-09-05 NOTE — CT
EXAMINATION TYPE: CT abdomen pelvis w con

CT DLP: 1505.2 mGycm, Automated exposure control for dose reduction was used.

 

DATE OF EXAM: 9/5/2024 12:48 PM

 

COMPARISON: 7/1/2023 

 

CLINICAL INDICATION: Male, 84 years old with history of C67.9 bladder ca; bladder cancer

 

TECHNIQUE:  Axial CT abdomen pelvis w con;Sagittal and coronal reformats were created on a separate w
orkstation. 

 

Contrast used:100 mL of Isovue 300 with IV Contrast, (none if empty)

Oral contrast used: with Oral Contrast (none if empty)

 

FINDINGS: 

LOWER CHEST: Severe atherosclerosis of the coronary arteries

 

ABDOMEN

LIVER: Unremarkable

GALLBLADDER AND BILE DUCTS: Unremarkable.

PANCREAS: Unremarkable.

SPLEEN: Unremarkable.

ADRENAL GLANDS: Unremarkable.

KIDNEYS AND URETERS: No evidence of hydronephrosis or renal calculus. The ureters are unremarkable.  


 

PELVIS

BLADDER: No suspicious bladder wall thickening on this limited exam without excreted IV contrast.

REPRODUCTIVE: Prostate is enlarged in size measuring 5.7 cm in transverse dimension.

 

ABDOMEN & PELVIS

STOMACH AND BOWEL: No evidence of bowel obstruction. Small bowel feces in the terminal ileum. Scatter
ed colonic diverticula.

PERITONEUM/RETROPERITONEUM: No evidence of pneumoperitoneum or free fluid.

VASCULATURE: No evidence of aortic aneurysm. Atherosclerosis of the aorta.

MUSCULOSKELETAL: No acute osseous abnormalities. Moderate disc degeneration changes are present throu
ghout the thoracolumbar spine.

LYMPH NODES: No gross evidence for lymphadenopathy.

SOFT TISSUE/ABDOMINAL WALL: Fat-containing umbilical hernia. Fat-containing inguinal hernia. Lesion i
n the perineum on the right seen on prior measures 37 x 20 mm, previously 37 x 22 mm.

 

IMPRESSION: 

1.  Stable appearance of the bladder wall no suspicious thickening visualized on this limited exam. N
o new lymphadenopathy identified.

2.  Similar size of lesion in the right perineum compared to prior on 7/1/2023 etiology uncertain.

3.  Severe atherosclerosis of the coronary arteries

4.  Fat-containing left inguinal hernia and umbilical hernia.

5.  Prostatomegaly correlate serum PSA.

## 2024-09-30 NOTE — XR
Porfirio Magaña G 

ID: [id number] 

: 1940

 

 

EXAMINATION TYPE: XR chest 2V

 

DATE OF EXAM: 2024

 

COMPARISON: 2023

 

HISTORY: 84-year-old male nausea and vomiting

 

TECHNIQUE:  AP and lateral views

 

FINDINGS:  

Heart mildly enlarged. Atherosclerotic arch calcifications. Strandy atelectasis mid and lower lungs. 
 DISH within the lower thoracic spine. No jeanne consolidation or pleural effusion. Hazy densities rel
ating to patient body habitus.

 

 

IMPRESSION:  

Mild cardiomegaly. Hazy densities relating to patient body habitus. Some strandy atelectasis in the m
id and lower lungs. No definite acute process.

## 2024-12-18 ENCOUNTER — HOSPITAL ENCOUNTER (OUTPATIENT)
Dept: HOSPITAL 47 - EC | Age: 84
Setting detail: OBSERVATION
LOS: 2 days | Discharge: HOME | End: 2024-12-20
Attending: INTERNAL MEDICINE | Admitting: INTERNAL MEDICINE
Payer: MEDICARE

## 2024-12-18 DIAGNOSIS — I11.0: ICD-10-CM

## 2024-12-18 DIAGNOSIS — Z87.891: ICD-10-CM

## 2024-12-18 DIAGNOSIS — N40.0: ICD-10-CM

## 2024-12-18 DIAGNOSIS — Z92.21: ICD-10-CM

## 2024-12-18 DIAGNOSIS — Z85.51: ICD-10-CM

## 2024-12-18 DIAGNOSIS — I25.10: ICD-10-CM

## 2024-12-18 DIAGNOSIS — Z79.4: ICD-10-CM

## 2024-12-18 DIAGNOSIS — I50.9: ICD-10-CM

## 2024-12-18 DIAGNOSIS — E11.9: ICD-10-CM

## 2024-12-18 DIAGNOSIS — M1A.9XX0: ICD-10-CM

## 2024-12-18 DIAGNOSIS — F32.A: ICD-10-CM

## 2024-12-18 DIAGNOSIS — I48.0: ICD-10-CM

## 2024-12-18 DIAGNOSIS — E78.2: ICD-10-CM

## 2024-12-18 DIAGNOSIS — Z79.899: ICD-10-CM

## 2024-12-18 DIAGNOSIS — Z79.01: ICD-10-CM

## 2024-12-18 DIAGNOSIS — K85.90: Primary | ICD-10-CM

## 2024-12-18 DIAGNOSIS — F41.1: ICD-10-CM

## 2024-12-18 DIAGNOSIS — Z79.85: ICD-10-CM

## 2024-12-18 LAB
ALBUMIN SERPL-MCNC: 4.3 G/DL (ref 3.5–5)
ALP SERPL-CCNC: 73 U/L (ref 38–126)
ALT SERPL-CCNC: 23 U/L (ref 4–49)
ANION GAP SERPL CALC-SCNC: 5 MMOL/L
APTT BLD: 24.5 SEC (ref 22–30)
AST SERPL-CCNC: 30 U/L (ref 17–59)
BASOPHILS # BLD AUTO: 0 K/UL (ref 0–0.2)
BASOPHILS NFR BLD AUTO: 0 %
BUN SERPL-SCNC: 19 MG/DL (ref 9–20)
CALCIUM SPEC-MCNC: 9.8 MG/DL (ref 8.4–10.2)
CHLORIDE SERPL-SCNC: 111 MMOL/L (ref 98–107)
CO2 SERPL-SCNC: 22 MMOL/L (ref 22–30)
EOSINOPHIL # BLD AUTO: 0.2 K/UL (ref 0–0.7)
EOSINOPHIL NFR BLD AUTO: 4 %
ERYTHROCYTE [DISTWIDTH] IN BLOOD BY AUTOMATED COUNT: 4.85 M/UL (ref 4.3–5.9)
ERYTHROCYTE [DISTWIDTH] IN BLOOD: 15 % (ref 11.5–15.5)
GLUCOSE BLD-MCNC: 134 MG/DL (ref 70–110)
GLUCOSE BLD-MCNC: 138 MG/DL (ref 70–110)
GLUCOSE SERPL-MCNC: 132 MG/DL (ref 74–99)
HCT VFR BLD AUTO: 41.8 % (ref 39–53)
HGB BLD-MCNC: 14.1 GM/DL (ref 13–17.5)
INR PPP: 1.1 (ref ?–1.2)
LIPASE SERPL-CCNC: 3956 U/L (ref 23–300)
LYMPHOCYTES # SPEC AUTO: 1.4 K/UL (ref 1–4.8)
LYMPHOCYTES NFR SPEC AUTO: 23 %
MAGNESIUM SPEC-SCNC: 1.8 MG/DL (ref 1.6–2.3)
MCH RBC QN AUTO: 29 PG (ref 25–35)
MCHC RBC AUTO-ENTMCNC: 33.7 G/DL (ref 31–37)
MCV RBC AUTO: 86 FL (ref 80–100)
MONOCYTES # BLD AUTO: 0.4 K/UL (ref 0–1)
MONOCYTES NFR BLD AUTO: 6 %
NEUTROPHILS # BLD AUTO: 4.1 K/UL (ref 1.3–7.7)
NEUTROPHILS NFR BLD AUTO: 66 %
PLATELET # BLD AUTO: 88 K/UL (ref 150–450)
POTASSIUM SERPL-SCNC: 4.5 MMOL/L (ref 3.5–5.1)
PROT SERPL-MCNC: 6.8 G/DL (ref 6.3–8.2)
PT BLD: 11.6 SEC (ref 10–12.5)
SODIUM SERPL-SCNC: 138 MMOL/L (ref 137–145)
WBC # BLD AUTO: 6.2 K/UL (ref 3.8–10.6)

## 2024-12-18 PROCEDURE — 36415 COLL VENOUS BLD VENIPUNCTURE: CPT

## 2024-12-18 PROCEDURE — 85610 PROTHROMBIN TIME: CPT

## 2024-12-18 PROCEDURE — 85025 COMPLETE CBC W/AUTO DIFF WBC: CPT

## 2024-12-18 PROCEDURE — 93005 ELECTROCARDIOGRAM TRACING: CPT

## 2024-12-18 PROCEDURE — 80053 COMPREHEN METABOLIC PANEL: CPT

## 2024-12-18 PROCEDURE — 99285 EMERGENCY DEPT VISIT HI MDM: CPT

## 2024-12-18 PROCEDURE — 74177 CT ABD & PELVIS W/CONTRAST: CPT

## 2024-12-18 PROCEDURE — 84484 ASSAY OF TROPONIN QUANT: CPT

## 2024-12-18 PROCEDURE — 83690 ASSAY OF LIPASE: CPT

## 2024-12-18 PROCEDURE — 84478 ASSAY OF TRIGLYCERIDES: CPT

## 2024-12-18 PROCEDURE — 71046 X-RAY EXAM CHEST 2 VIEWS: CPT

## 2024-12-18 PROCEDURE — 83735 ASSAY OF MAGNESIUM: CPT

## 2024-12-18 PROCEDURE — 85730 THROMBOPLASTIN TIME PARTIAL: CPT

## 2024-12-18 PROCEDURE — 96374 THER/PROPH/DIAG INJ IV PUSH: CPT

## 2024-12-18 RX ADMIN — INSULIN ASPART SCH: 100 INJECTION, SOLUTION INTRAVENOUS; SUBCUTANEOUS at 17:45

## 2024-12-18 RX ADMIN — APIXABAN SCH MG: 5 TABLET, FILM COATED ORAL at 21:13

## 2024-12-18 RX ADMIN — INSULIN DETEMIR SCH: 100 INJECTION, SOLUTION SUBCUTANEOUS at 22:21

## 2024-12-18 RX ADMIN — TAMSULOSIN HYDROCHLORIDE SCH MG: 0.4 CAPSULE ORAL at 21:13

## 2024-12-18 NOTE — CT
EXAMINATION TYPE: CT abdomen pelvis w con

CT DLP: 1364.7 mGycm, Automated exposure control for dose reduction was used.

 

DATE OF EXAM: 12/18/2024 1:21 PM

 

COMPARISON: CT abdomen pelvis 9/5/2024, 7/1/2023

CLINICAL INDICATION:Male, 84 years old with history of abdominal pain; Abdominal pain

 

TECHNIQUE:  Standard  CT of the abdomen and pelvis following the administration of 100 cc of Isovue 3
00 IV contrast material. Coronal and sagittal reformats were performed. 

 

FINDINGS: 

LOWER CHEST: Posterior dependent subsegmental atelectasis is noted. Moderate to severe coronary arter
y calcifications.

 

ABDOMEN

LIVER: Unremarkable

GALLBLADDER AND BILE DUCTS: Unremarkable.

PANCREAS: Subtle patchy fat stranding around the pancreas. The pancreas enhances homogeneously. No fo
bartolo uterine fluid collections or ductal dilatation.

SPLEEN: Mildly enlarged measuring 16.2 cm in greatest dimension.

ADRENAL GLANDS: Unremarkable.

KIDNEYS AND URETERS: No evidence of hydronephrosis or renal calculus. The kidneys enhance symmetrical
ly. Similar bilateral perinephric fat stranding. Subcentimeter stable left renal cyst. Contrast is de
monstrated within both collecting systems on the delayed phase. Left retroaortic renal vein.

 

PELVIS

BLADDER: Moderately distended.

REPRODUCTIVE: Prostate is enlarged in size measuring 5.5 cm in transverse dimension.

 

ABDOMEN & PELVIS

STOMACH AND BOWEL: Stomach and duodenum are unremarkable. Redundant sigmoid colon. No focal bowel wal
l thickening or surrounding inflammatory changes. The appendix is not definitively visualized however
 no significant inflammatory changes in the right lower quadrant. Stool is present within the small b
owel suggesting slow transit. No evidence of bowel obstruction. 

PERITONEUM: No evidence of pneumoperitoneum or free fluid.

 

VASCULATURE: Mild to moderate atherosclerotic calcifications are present throughout the abdominal aor
ta and its branches. No evidence of aortic aneurysm. Pelvic phleboliths.

MUSCULOSKELETAL: No acute osseous abnormalities. Moderate disc degeneration changes are present throu
ghout the thoracolumbar spine.

LYMPH NODES: Stable mildly prominent gastrohepatic lymph nodes measuring up to 9 mm short axis.

SOFT TISSUE/ABDOMINAL WALL: Small to moderate size umbilical hernia redemonstrated containing fat and
 mesenteric vessels. Redemonstration of fat-containing left inguinal hernia. Similar 3.9 x 1.9 cm les
ion in the right perineum (series 201, image 100).

 

IMPRESSION:

1.  Inflammatory changes surrounding the pancreas. Correlate for acute interstitial pancreatic head. 
No surrounding fluid collections.

2. Mild splenomegaly redemonstrated.

3. Stable right perineum lesion of uncertain etiology.

4. Stable umbilical and left inguinal fat-containing hernias.

5. Prostatomegaly.

6. Moderate to severe coronary artery calcifications.

 

[facility], Workstation: [id number], 12/18/2024 1:29 PM

## 2024-12-18 NOTE — P.HPIM
History of Present Illness


H&P Date: 24


Chief Complaint: Chest pain/acute pancreatitis





HISTORY OF PRESENT ILLNESS


This is an 84-year-old male patient of mine with past medical history of cor

onary artery disease, hypertension, hyperlipidemia, diabetes mellitus type 2, 

generalized anxiety disorder, bladder cancer under the care of Dr. Chris, 

chronic gout, history of recurrent pancreatitis, he was hospitalized last in 

2023 for acute pancreatitis, patient woke up in the morning today with 

left-sided chest pain associated with minimal burping, minimal nausea no 

vomiting, he ended up coming to the emergency department for evaluation, he had 

a twelve-lead EKG did not show evidence of acute abnormalities, his vital signs 

were okay, his lipase was quite elevated, at 3956, had a CT scan of the abdomen 

pelvis that show evidence of acute pancreatitis, because of the presentation he 

was started on IV fluid resuscitation, pain management, he was admitted to the 

hospital for further evaluation GI consultation was obtained from Dr. Shaffer.








REVIEW OF SYSTEMS


Constitutional: No fever, no chills, no night sweats.  No weight change.  

Generalized weakness,no fatigue or lethargy.  No daytime sleepiness.


HEENT: No headache.  No blurred vision or double vision, no loss of vision.    

hard of Hearing, no ringing in the ears, no dizziness.  No nasal drainage or co

ngestion.  No epistaxis.  No sore throat.


Lungs: Positive for  shortness of breath,no cough, no sputum production.  No 

wheezing.


Cardiovascular: Reports  chest pain, no lower extremity edema.  No palpitations.

 No paroxysmal nocturnal dyspnea.  No orthopnea.  No lightheadedness or 

dizziness.  No syncopal episodes.  


Abdominal: Positive for abdominal pain.  No nausea, vomiting.  No diarrhea.  No 

constipation.  No bloody or tarry stools..  No loss of appetite.


Genitourinary: No dysuria, increased frequency, urgency.  No urinary retention.


Musculoskeletal: No myalgias.  No muscle weakness, no gait dysfunction, no 

frequent falls.  No back pain.  No neck pain.


Integumentary: No wounds, no lesions.  No rash or pruritus.  No unusual 

bruising.  No change in hair or nails.


Neurologic: No aphasia. No facial droop. No change in mentation. No head injury.

No headache. No paralysis. No paresthesia.


Psychiatric: No depression.  No anxiety.  No mood swings.


Endocrine: No abnormal blood sugars.  No weight change.   





MEDICAL HISTORY


Coronary artery disease


Hypertension


Hyperlipidemia


Diabetes mellitus type 2


Generalized anxiety disorder


Bladder cancer


Chronic gout





SURGICAL HISTORY


Left total knee arthroplasty


Right shoulder rotator cuff repair


Bladder surgery


Appendectomy


Tonsillectomy


Colonoscopy and EGD .





SOCIAL HISTORY


Patient has remote history of tobacco use, no alcohol use, marijuana or illicit 

drug use.





FAMILY HISTORY


Father  from a stroke with history of malaria at 27 years of age.  Mother 

 at age 83 from heart failure.  Patient has one half brother that  at 

age 56 from Covid and MI.  Patient has one daughter with no major medical 

problems and 2 sons with no major medical problems.





PHYSICAL EXAMINATION


Gen: This is an 84-year-old male patient.  He is resting in bed appears to be 

comfortable and in no acute distress.


HEENT: Head is atraumatic, normocephalic. Pupils equal, round. Sclerae is 

anicteric, conjunctiva were pale mucous membranes of the mouth are somewhat dry.




NECK: Supple. No JVD. No lymphadenopathy. No thyromegaly. 


LUNGS:  decreased breath sound at bases, few rhonchi, no expiratory wheezes, no 

chest wall tenderness, no intercostal retractions.


HEART: First heart sound is depressed, second heart sound is normal, 2/6 

systolic ejection murmur at the left sternal border.  No S3, no S4. 


ABDOMEN: Soft. Bowel sounds are present. No masses. mild tenderness in the 

epigastric area no rebound or guarding positive bowel sounds.


EXTREMITIES:  +2 edema no calf tenderness, dorsalis pedis +2 bilaterally.


NEUROLOGICAL: Patient is awake, alert and oriented x3. Cranial nerves 2 through 

12 are grossly intact, muscle power 4 out of 5 in upper and lower extremities 

bilaterally deep tendon reflexes were normal


ASSESSMENT AND PLAN








1.  Acute pancreatitis that has been recurrent due to possible the use of GLP-1 

receptor agonist.  We will discontinue that at this point in time, continue 

supportive care, continue IV fluid resuscitation in the form of normal saline, 

continue pain management, GI consultation appreciated.





2.  Hypertension and hypertensive cardiovascular disease.  Continue lisinopril 

2.5 mg once every day, continue metoprolol 25 mg orally twice every day.   





3.  Mixed hyperlipidemia.  Continue Rosuvastatin 10 mg orally daily, monitor the

patient lipid panel, keep LDL 55-70.





4.  Diabetes mellitus type 2.  Continue Levemir 35 units at bedtime,  we will 

continue with NovoLog sliding scale, discontinue Ozempic at this point in time.





5.  paroxysmal atrial fibrillation.  Continue patient on Eliquis  5 mg orally 

twice every day,, continue patient on metoprolol 25 mg orally twice every day.





6.  History of bladder cancer under the care Dr Chris status post treatment.





7.  Chronic gout.  Continue allopurinol 100 mg daily





8.  GI prophylaxis.  Protonix 40 mg orally once every day.  





9.  DVT prophylaxis.  continue with bilateral knee-high TERA hose, continue 

Eliquis 5 mg po bid





10.  Observation.  Will repeat the patient labs tomorrow morning for





11.  Full code.





Past Medical History


Past Medical History: Atrial Flutter, Coronary Artery Disease (CAD), Cancer, 

Chest Pain / Angina, Diabetes Mellitus, Hearing Disorder / Deafness, 

Hyperlipidemia, Hypertension, Prostate Disorder, Renal Disease


Additional Past Medical History / Comment(s): BPH, bladder cancer 21, 

hearing aids


History of Any Multi-Drug Resistant Organisms: None Reported


Past Surgical History: Appendectomy, Bladder Surgery, Heart Catheterization, 

Joint Replacement, Tonsillectomy


Additional Past Surgical History / Comment(s): left knee replaced, right rotator

cuff SX, COLONOSCOPY,


Past Anesthesia/Blood Transfusion Reactions: No Reported Reaction


Past Psychological History: Depression


Smoking Status: Former smoker


Past Alcohol Use History: Occasional


Past Drug Use History: None Reported





- Past Family History


  ** Mother


Family Medical History: No Reported History


Additional Family Medical History / Comment(s): Mother had history of congenital

heart disease, CHF.





  ** Father


Family Medical History: CVA/TIA


Additional Family Medical History / Comment(s): Father contracted malaria.  

History of CVA.





  ** Brother(s)


Family Medical History: No Reported History


Additional Family Medical History / Comment(s): Brother has history of 

headaches.





  ** Sister(s)


Family Medical History: No Reported History





  ** Daughter(s)


Family Medical History: No Reported History


Additional Family Medical History / Comment(s): Patient has one daughter with no

major medical problems.





  ** Son(s)


Family Medical History: No Reported History


Additional Family Medical History / Comment(s): Patient has one son with no 

major medical problems.





Medications and Allergies


                                Home Medications











 Medication  Instructions  Recorded  Confirmed  Type


 


Insulin Glargine,Hum.rec.anlog 35 unit SQ HS 14 History





[Lantus Solostar Pen]    


 


Rosuvastatin Calcium [Crestor] 10 mg PO DAILY 14 History


 


allopurinoL [Zyloprim] 100 mg PO DAILY 14 History


 


lisinopriL [Zestril] 2.5 mg PO DAILY  tab 10/07/18 12/18/24 Rx


 


Insulin Lispro [humaLOG Kwikpen] 10 unit SQ AC-TID 21 History


 


Apixaban [Eliquis] 5 mg PO BID 21 History


 


Tamsulosin [Flomax] 0.4 mg PO BID 22 History


 


Ascorbic Acid [Vitamin C] 1,000 mg PO DAILY 24 History


 


Ferrous Sulfate [Feosol] 325 mg PO DAILY 24 History


 


Semaglutide [Ozempic] 1 mg SQ CONNORS 24 History








                                    Allergies











Allergy/AdvReac Type Severity Reaction Status Date / Time


 


No Known Allergies Allergy   Verified 24 13:16














Physical Exam


Vitals: 


                                   Vital Signs











  Temp Pulse Resp BP Pulse Ox


 


 24 14:44   87  18  140/63  97


 


 24 10:08  98 F  58 L  18  160/64  100








                                Intake and Output











 24





 22:59 06:59 14:59


 


Other:   


 


  Weight   92.986 kg














Results


CBC & Chem 7: 


                                 24 10:48





                                 24 10:48


Labs: 


                  Abnormal Lab Results - Last 24 Hours (Table)











  24 Range/Units





  10:48 10:48 


 


Plt Count  88 L   (150-450)  k/uL


 


Chloride   111 H  ()  mmol/L


 


Glucose   132 H  (74-99)  mg/dL


 


Lipase   3956 H  ()  U/L

## 2024-12-18 NOTE — XR
EXAMINATION TYPE: XR chest 2V

 

DATE OF EXAM: 12/18/2024 11:13 AM

 

COMPARISON: None. 

 

CLINICAL INDICATION: Male, 84 years old with history of Chest Pain, 

 

TECHNIQUE:  Frontal and lateral views of the chest are obtained.

 

FINDINGS:  There is no focal air space opacity, pleural effusion, or pneumothorax seen.  The cardiac 
silhouette size is within normal limits.   The osseous structures are intact.

 

IMPRESSION:  No acute cardiopulmonary process.

 

[facility], Workstation: [id number], 12/18/2024 11:14 AM

## 2024-12-18 NOTE — ED
Chest Pain HPI





- General


Chief Complaint: Chest Pain


Stated Complaint: CHEST PAIN


Time Seen by Provider: 12/18/24 10:10


Source: patient


Mode of arrival: ambulatory





- History of Present Illness


Initial Comments: 





84-year-old male presents emergency department with chest pain and epigastric 

pain.  States that the pain started last night.  He got better and then worsened

again today.  He describes it as a pressure sensation.  He denies associated 

shortness of breath.  He did not take anything for the pain before coming in.  

Denies history of similar pain.  No nausea or vomiting.  No diaphoresis.  Does 

have a cardiac history.  He has congestive heart failure and atrial flutter.  

Patient does take Eliquis for anticoagulation.  No fevers.  No ripping or 

tearing sensation to his back.  No other alleviating, precipitating or modifying

factors





- Related Data


                                Home Medications











 Medication  Instructions  Recorded  Confirmed


 


Insulin Glargine,Hum.rec.anlog 35 unit SQ HS 12/22/14 12/18/24





[Lantus Solostar Pen]   


 


Rosuvastatin Calcium [Crestor] 10 mg PO DAILY 12/22/14 12/18/24


 


allopurinoL [Zyloprim] 100 mg PO DAILY 12/22/14 12/18/24


 


Insulin Lispro [humaLOG Kwikpen] 10 unit SQ AC-TID 08/04/21 12/18/24


 


Apixaban [Eliquis] 5 mg PO BID 09/13/21 12/18/24


 


Tamsulosin [Flomax] 0.4 mg PO BID 05/30/22 12/18/24


 


Ascorbic Acid [Vitamin C] 1,000 mg PO DAILY 12/18/24 12/18/24


 


Ferrous Sulfate [Iron (65  mg PO DAILY 12/18/24 12/18/24





Elemental)]   








                                  Previous Rx's











 Medication  Instructions  Recorded


 


lisinopriL [Zestril] 2.5 mg PO DAILY  tab 10/07/18











                                    Allergies











Allergy/AdvReac Type Severity Reaction Status Date / Time


 


No Known Allergies Allergy   Verified 12/18/24 13:16














Review of Systems


ROS Statement: 


Those systems with pertinent positive or pertinent negative responses have been 

documented in the HPI.





ROS Other: All systems not noted in ROS Statement are negative.





Past Medical History


Past Medical History: Atrial Flutter, Coronary Artery Disease (CAD), Cancer, 

Chest Pain / Angina, Diabetes Mellitus, Hearing Disorder / Deafness, 

Hyperlipidemia, Hypertension, Prostate Disorder, Renal Disease


Additional Past Medical History / Comment(s): BPH, bladder cancer 5/1/21, 

hearing aids


History of Any Multi-Drug Resistant Organisms: None Reported


Past Surgical History: Appendectomy, Bladder Surgery, Heart Catheterization, 

Joint Replacement, Tonsillectomy


Additional Past Surgical History / Comment(s): left knee replaced, right rotator

 cuff SX, COLONOSCOPY,


Past Anesthesia/Blood Transfusion Reactions: No Reported Reaction


Past Psychological History: Depression


Smoking Status: Former smoker


Past Alcohol Use History: Occasional


Past Drug Use History: None Reported





- Past Family History


  ** Mother


Family Medical History: No Reported History


Additional Family Medical History / Comment(s): Mother had history of congenital

 heart disease, CHF.





  ** Father


Family Medical History: CVA/TIA


Additional Family Medical History / Comment(s): Father contracted malaria.  

History of CVA.





  ** Brother(s)


Family Medical History: No Reported History


Additional Family Medical History / Comment(s): Brother has history of 

headaches.





  ** Sister(s)


Family Medical History: No Reported History





  ** Daughter(s)


Family Medical History: No Reported History


Additional Family Medical History / Comment(s): Patient has one daughter with no

 major medical problems.





  ** Son(s)


Family Medical History: No Reported History


Additional Family Medical History / Comment(s): Patient has one son with no 

major medical problems.





General Exam


General appearance: alert, in no apparent distress


Head exam: Present: atraumatic, normocephalic, normal inspection


Eye exam: Present: normal appearance, PERRL, EOMI.  Absent: scleral icterus, co

njunctival injection, periorbital swelling


ENT exam: Present: normal exam, mucous membranes moist


Neck exam: Present: normal inspection.  Absent: tenderness, meningismus, 

lymphadenopathy


Respiratory exam: Present: normal lung sounds bilaterally.  Absent: respiratory 

distress, wheezes, rales, rhonchi, stridor


Cardiovascular Exam: Present: regular rate, normal rhythm, normal heart sounds. 

 Absent: systolic murmur, diastolic murmur, rubs, gallop, clicks


GI/Abdominal exam: Present: tenderness (Epigastric), normal bowel sounds.  

Absent: distended, guarding, rebound, rigid


Extremities exam: Present: normal inspection, full ROM, normal capillary refill.

  Absent: tenderness, pedal edema, joint swelling, calf tenderness


Back exam: Present: normal inspection


Neurological exam: Present: alert, oriented X3, CN II-XII intact


Psychiatric exam: Present: normal affect, normal mood


Skin exam: Present: warm, dry, intact, normal color.  Absent: rash





Course


                                   Vital Signs











  12/18/24 12/18/24 12/18/24





  10:08 14:44 18:12


 


Temperature 98 F  


 


Pulse Rate 58 L 87 68


 


Respiratory 18 18 22





Rate   


 


Blood Pressure 160/64 140/63 113/56


 


O2 Sat by Pulse 100 97 95





Oximetry   














  12/18/24





  21:14


 


Temperature 


 


Pulse Rate 99


 


Respiratory 18





Rate 


 


Blood Pressure 116/67


 


O2 Sat by Pulse 94 L





Oximetry 














Chest Pain MDM





- MDM


Was pt. sent in by a medical professional or institution (, PA, NP, urgent 

care, hospital, or nursing home...) When possible be specific


@  -No


Did you speak to anyone other than the patient for history (EMS, parent, family,

 police, friend...)? What history was obtained from this source 


@  -No


Did you review nursing and triage notes (agree or disagree)?  Why? 


@  -I reviewed and agree with nursing and triage notes


Were old charts reviewed (outside hosp., previous admission, EMS record, old 

EKG, old radiological studies, urgent care reports/EKG's, nursing home records)?

 Report findings 


@  -No old charts were reviewed


Differential Diagnosis (chest pain, altered mental status, abdominal pain women,

 abdominal pain men, vaginal bleeding, weakness, fever, dyspnea, syncope, 

headache, dizziness, GI bleed, back pain, seizure, CVA, palpatations, mental 

health, musculoskeletal)? 


@  -Differential Chest Pain:


Stable Angina, Unstable Angina, STEMI, NSTEMI Aortic Dissection, Pneumothorax, 

Musculoskeletal, Esophageal Spasm GERD, Cholecystitis, Pancreatitis, Zoster, 

this is not meant to be an all-inclusive list. 





EKG interpreted by me (3pts min.).


@  -Yes and demonstrates sinus rhythm with occasional PACs.  Rate of 60.  GA 

interval 218.  .  QTc of 414.  No acute ST segment elevations or 

depressions


X-rays interpreted by me (1pt min.).


@  -Yes and demonstrates no acute process


CT interpreted by me (1pt min.).


@  -None done


U/S interpreted by me (1pt. min.).


@  -None done


What testing was considered but not performed or refused? (CT, X-rays, U/S, 

labs)? Why?


@  -None


What meds were considered but not given or refused? Why?


@  -None


Did you discuss the management of the patient with other professionals 

(professionals i.e. , PA, NP, lab, RT, psych nurse, , , 

teacher, , )? Give summary


@  -Spoke with Dr. Christiansen for the admission


Was smoking cessation discussed for >3mins.?


@  -No


Was critical care preformed (if so, how long)?


@  -No


Were there social determinants of health that impacted care today? How? 

(Homelessness, low income, unemployed, alcoholism, drug addiction, 

transportation, low edu. Level, literacy, decrease access to med. care, assisted, 

rehab)?


@  -No


Was there de-escalation of care discussed even if they declined (Discuss DNR or 

withdrawal of care, Hospice)? DNR status


@  -No


What co-morbidities impacted this encounter? (DM, HTN, Smoking, COPD, CAD, 

Cancer, CVA, ARF, Chemo, Hep., AIDS, mental health diagnosis, sleep apnea, 

morbid obesity)?


@  -Afebrile on anticoagulation


Was patient admitted / discharged? Hospital course, mention meds given and 

route, prescriptions, significant lab abnormalities, going to OR and other 

pertinent info.


@  -Upon arrival patient seen and evaluated in room 25.  Thorough history and 

physical exam was performed.  IV access was established.  Laboratory studies are

 conducted.  Chest x-ray was performed.  I did recommend overnight observation 

for cardiac rule out.  Patient is agreeable to this.  Spoke with Dr. Christiansen for 

the admission


Undiagnosed new problem with uncertain prognosis?


@  -No


Drug Therapy requiring intensive monitoring for toxicity (Heparin, Nitro, 

Insulin, Cardizem)?


@  -No


Were any procedures done?


@  -No


Diagnosis/symptom?


@  -Acute chest pain, possible ACS


Acute, or Chronic, or Acute on Chronic?


@  -Acute


Uncomplicated (without systemic symptoms) or Complicated (systemic symptoms)?


@  -Complicated


Side effects of treatment?


@  -No


Exacerbation, Progression, or Severe Exacerbation?


@  -No


Poses a threat to life or bodily function? How? (Chest pain, USA, MI, pneumonia,

 PE, COPD, DKA, ARF, appy, cholecystitis, CVA, Diverticulitis, Homicidal, 

Suicidal, threat to staff... and all critical care pts)


@  -No








Disposition


Clinical Impression: 


 Pancreatitis, Chest pain





Disposition: ADMITTED AS IP TO THIS HOSP


Condition: Stable


Is patient prescribed a controlled substance at d/c from ED?: No


Time of Disposition: 13:26


Decision to Admit Reason: Admit from EC


Decision Date: 12/18/24


Decision Time: 13:26

## 2024-12-19 LAB
ALBUMIN SERPL-MCNC: 3.8 G/DL (ref 3.8–4.9)
ALBUMIN/GLOB SERPL: 1.73 RATIO (ref 1.6–3.17)
ALP SERPL-CCNC: 66 U/L (ref 41–126)
ALT SERPL-CCNC: 16 U/L (ref 10–49)
ANION GAP SERPL CALC-SCNC: 11.6 MMOL/L (ref 4–12)
AST SERPL-CCNC: 20 U/L (ref 14–35)
BASOPHILS # BLD AUTO: 0.03 X 10*3/UL (ref 0–0.1)
BASOPHILS NFR BLD AUTO: 0.5 %
BUN SERPL-SCNC: 15.6 MG/DL (ref 9–27)
BUN/CREAT SERPL: 19.5 RATIO (ref 12–20)
CALCIUM SPEC-MCNC: 9.1 MG/DL (ref 8.7–10.3)
CHLORIDE SERPL-SCNC: 110 MMOL/L (ref 96–109)
CO2 SERPL-SCNC: 22.4 MMOL/L (ref 21.6–31.8)
EOSINOPHIL # BLD AUTO: 0.21 X 10*3/UL (ref 0.04–0.35)
EOSINOPHIL NFR BLD AUTO: 3.4 %
ERYTHROCYTE [DISTWIDTH] IN BLOOD BY AUTOMATED COUNT: 4.82 X 10*6/UL (ref 4.4–5.6)
ERYTHROCYTE [DISTWIDTH] IN BLOOD: 14.6 % (ref 11.5–14.5)
GLOBULIN SER CALC-MCNC: 2.2 G/DL (ref 1.6–3.3)
GLUCOSE BLD-MCNC: 117 MG/DL (ref 70–110)
GLUCOSE BLD-MCNC: 136 MG/DL (ref 70–110)
GLUCOSE BLD-MCNC: 159 MG/DL (ref 70–110)
GLUCOSE BLD-MCNC: 259 MG/DL (ref 70–110)
GLUCOSE SERPL-MCNC: 128 MG/DL (ref 70–110)
HCT VFR BLD AUTO: 42 % (ref 39.6–50)
HGB BLD-MCNC: 13.2 G/DL (ref 13–17)
IMM GRANULOCYTES BLD QL AUTO: 0.3 %
LIPASE SERPL-CCNC: 280 U/L (ref 14–60)
LYMPHOCYTES # SPEC AUTO: 1.53 X 10*3/UL (ref 0.9–5)
LYMPHOCYTES NFR SPEC AUTO: 24.5 %
MCH RBC QN AUTO: 27.4 PG (ref 27–32)
MCHC RBC AUTO-ENTMCNC: 31.4 G/DL (ref 32–37)
MCV RBC AUTO: 87.1 FL (ref 80–97)
MONOCYTES # BLD AUTO: 0.48 X 10*3/UL (ref 0.2–1)
MONOCYTES NFR BLD AUTO: 7.7 %
NEUTROPHILS # BLD AUTO: 3.98 X 10*3/UL (ref 1.8–7.7)
NEUTROPHILS NFR BLD AUTO: 63.6 %
NRBC BLD AUTO-RTO: 0 X 10*3/UL (ref 0–0.01)
PLATELET # BLD AUTO: 89 X 10*3/UL (ref 140–440)
POTASSIUM SERPL-SCNC: 4.5 MMOL/L (ref 3.5–5.5)
PROT SERPL-MCNC: 6 G/DL (ref 6.2–8.2)
SODIUM SERPL-SCNC: 144 MMOL/L (ref 135–145)
WBC # BLD AUTO: 6.25 X 10*3/UL (ref 4.5–10)

## 2024-12-19 RX ADMIN — PANTOPRAZOLE SODIUM SCH MG: 40 INJECTION, POWDER, FOR SOLUTION INTRAVENOUS at 08:21

## 2024-12-19 RX ADMIN — ATORVASTATIN CALCIUM SCH MG: 20 TABLET, FILM COATED ORAL at 08:22

## 2024-12-19 RX ADMIN — OXYCODONE HYDROCHLORIDE AND ACETAMINOPHEN SCH MG: 500 TABLET ORAL at 08:22

## 2024-12-19 RX ADMIN — Medication SCH MG: at 08:22

## 2024-12-19 RX ADMIN — CEFAZOLIN SCH MLS/HR: 330 INJECTION, POWDER, FOR SOLUTION INTRAMUSCULAR; INTRAVENOUS at 00:45

## 2024-12-19 NOTE — P.PN
Subjective


Progress Note Date: 12/19/24








HISTORY OF PRESENT ILLNESS


This is an 84-year-old male patient of mine with past medical history of 

coronary artery disease, hypertension, hyperlipidemia, diabetes mellitus type 2,

generalized anxiety disorder, bladder cancer under the care of Dr. Chris, 

chronic gout, history of recurrent pancreatitis, he was hospitalized last in 

July 2023 for acute pancreatitis, patient woke up in the morning today with 

left-sided chest pain associated with minimal burping, minimal nausea no 

vomiting, he ended up coming to the emergency department for evaluation, he had 

a twelve-lead EKG did not show evidence of acute abnormalities, his vital signs 

were okay, his lipase was quite elevated, at 3956, had a CT scan of the abdomen 

pelvis that show evidence of acute pancreatitis, because of the presentation he 

was started on IV fluid resuscitation, pain management, he was admitted to the 

hospital for further evaluation GI consultation was obtained from Dr. Barnes.





12/19: Patient sitting up in bed in no apparent distress, his feet a lot better 

today, he continues to have some pressure in the epigastric area, he has no 

nausea or vomiting at this time, he was started on IV fluid resuscitation, he is

receiving morphine for pain control, he was seen earlier by gastroenterology, 

patient will be started on full liquid diet, repeat the patient amylase and 

lipase in the next 24 hours.  His lipase today is 280 much better improvement.








REVIEW OF SYSTEMS


Constitutional: No fever, no chills, no night sweats.  No weight change.  

Generalized weakness,no fatigue or lethargy.  No daytime sleepiness.


HEENT: No headache.  No blurred vision or double vision, no loss of vision.    

hard of Hearing, no ringing in the ears, no dizziness.  No nasal drainage or 

congestion.  No epistaxis.  No sore throat.


Lungs: Positive for  shortness of breath,no cough, no sputum production.  No 

wheezing.


Cardiovascular: Reports  chest pain, no lower extremity edema.  No palpitations.

 No paroxysmal nocturnal dyspnea.  No orthopnea.  No lightheadedness or 

dizziness.  No syncopal episodes.  


Abdominal: Positive for abdominal pain.  No nausea, vomiting.  No diarrhea.  No 

constipation.  No bloody or tarry stools..  No loss of appetite.


Genitourinary: No dysuria, increased frequency, urgency.  No urinary retention.


Musculoskeletal: No myalgias.  No muscle weakness, no gait dysfunction, no 

frequent falls.  No back pain.  No neck pain.


Integumentary: No wounds, no lesions.  No rash or pruritus.  No unusual 

bruising.  No change in hair or nails.


Neurologic: No aphasia. No facial droop. No change in mentation. No head injury.

No headache. No paralysis. No paresthesia.


Psychiatric: No depression.  No anxiety.  No mood swings.


Endocrine: No abnormal blood sugars.  No weight change.   








PHYSICAL EXAMINATION


Gen: This is an 84-year-old male patient.  He is resting in bed appears to be 

comfortable and in no acute distress.


HEENT: Head is atraumatic, normocephalic. Pupils equal, round. Sclerae is 

anicteric, conjunctiva were pale mucous membranes of the mouth are somewhat dry.




NECK: Supple. No JVD. No lymphadenopathy. No thyromegaly. 


LUNGS:  decreased breath sound at bases, few rhonchi, no expiratory wheezes, no 

chest wall tenderness, no intercostal retractions.


HEART: First heart sound is depressed, second heart sound is normal, 2/6 systol

ic ejection murmur at the left sternal border.  No S3, no S4. 


ABDOMEN: Soft. Bowel sounds are present. No masses. mild tenderness in the 

epigastric area no rebound or guarding positive bowel sounds.


EXTREMITIES:  +2 edema no calf tenderness, dorsalis pedis +2 bilaterally.


NEUROLOGICAL: Patient is awake, alert and oriented x3. Cranial nerves 2 through 

12 are grossly intact, muscle power 4 out of 5 in upper and lower extremities 

bilaterally deep tendon reflexes were normal


ASSESSMENT AND PLAN








1.  Acute pancreatitis that has been recurrent due to possible the use of GLP-1 

receptor agonist.  We will discontinue that at this point in time, continue 

supportive care, continue IV fluid resuscitation in the form of normal saline, 

continue pain management, GI consultation appreciated.





2.  Hypertension and hypertensive cardiovascular disease.  Continue lisinopril 

2.5 mg once every day, continue metoprolol 25 mg orally twice every day.   





3.  Mixed hyperlipidemia.  Continue Rosuvastatin 10 mg orally daily, monitor the

patient lipid panel, keep LDL 55-70.





4.  Diabetes mellitus type 2.  Continue Levemir 35 units at bedtime,  we will 

continue with NovoLog sliding scale, discontinue Ozempic at this point in time.





5.  paroxysmal atrial fibrillation.  Continue patient on Eliquis  5 mg orally 

twice every day,, continue patient on metoprolol 25 mg orally twice every day.





6.  History of bladder cancer under the care Dr Chris status post treatment.





7.  Chronic gout.  Continue allopurinol 100 mg daily





8.  GI prophylaxis.  Protonix 40 mg orally once every day.  





9.  DVT prophylaxis.  continue with bilateral knee-high TERA hose, continue 

Eliquis 5 mg po bid





10.  Likely home tomorrow morning








Objective





- Vital Signs


Vital signs: 


                                   Vital Signs











Temp  97.8 F   12/19/24 14:27


 


Pulse  73   12/19/24 14:27


 


Resp  18   12/19/24 14:27


 


BP  112/61   12/19/24 14:27


 


Pulse Ox  97   12/19/24 14:27


 


FiO2      








                                 Intake & Output











 12/19/24 12/19/24 12/20/24





 06:59 18:59 06:59


 


Intake Total  716 


 


Balance  716 


 


Weight 92.986 kg  


 


Intake:   


 


  Oral  716 


 


Other:   


 


  # Voids 2 2 














- Labs


CBC & Chem 7: 


                                 12/19/24 05:48





                                 12/19/24 05:48


Labs: 


                  Abnormal Lab Results - Last 24 Hours (Table)











  12/18/24 12/19/24 12/19/24 Range/Units





  20:58 05:48 05:48 


 


MCHC   31.4 L   (32.0-37.0)  g/dL


 


RDW   14.6 H   (11.5-14.5)  %


 


Plt Count   89 L   (140-440)  X 10*3/uL


 


Chloride    110 H  ()  mmol/L


 


Glucose    128 H  ()  mg/dL


 


POC Glucose (mg/dL)  134 H    ()  mg/dL


 


Total Protein    6.0 L  (6.2-8.2)  g/dL


 


Lipase    280 H  (14-60)  U/L














  12/19/24 12/19/24 12/19/24 Range/Units





  06:10 12:01 17:28 


 


MCHC     (32.0-37.0)  g/dL


 


RDW     (11.5-14.5)  %


 


Plt Count     (140-440)  X 10*3/uL


 


Chloride     ()  mmol/L


 


Glucose     ()  mg/dL


 


POC Glucose (mg/dL)  117 H  259 H  136 H  ()  mg/dL


 


Total Protein     (6.2-8.2)  g/dL


 


Lipase     (14-60)  U/L Mercedes Flap Text: The defect edges were debeveled with a #15 scalpel blade.  Given the location of the defect, shape of the defect and the proximity to free margins a Mercedes flap was deemed most appropriate.  Using a sterile surgical marker, an appropriate advancement flap was drawn incorporating the defect and placing the expected incisions within the relaxed skin tension lines where possible. The area thus outlined was incised deep to adipose tissue with a #15 scalpel blade.  The skin margins were undermined to an appropriate distance in all directions utilizing iris scissors.

## 2024-12-19 NOTE — P.CONS
History of Present Illness





- Reason for Consult


Consult date: 12/19/24


Pancreatitis


Requesting physician: Maco Christiansen





- Chief Complaint


Abdominal pain





- History of Present Illness





This a pleasant 84-year-old male with a past medical history of diabetes 

mellitus on Ozempic, pancreatitis, CHF, atrial fibrillation on Eliquis, 

hypertension, coronary artery disease and bladder cancer status post chemo 

diagnosed in 2020.  Patient states he started getting epigastric pain Tuesday 

night not associated with any nausea or vomiting.  States he had this once 

before 1 to 2 years ago.  States he was diagnosed with pancreatitis but he was 

unsure of the etiology.  He denies any current alcohol use but states that he 

was a heavy drinker and drinks daily for 8 to 10 years but he quit 25 years ago.

 Patient presented with an elevated lipase of 3956, no elevation in his LFTs, no

leukocytosis.  Apparently patient has been on Ozempic for his diabetes mellitus 

and was thought that could be the cause of his pancreatitis.  They had stopped 

it and then resumed it.  Today patient states his abdominal pain is quite a bit 

better and almost resolved.  He has been tolerating clear liquid diet without 

any nausea or vomiting.





Review of Systems





REVIEW OF SYSTEMS:


CARDIOPULMONARY: No chest pain or shortness of breath.  


Gastrointestinal: Abdominal pain, sharp in the epigastric region.   No nausea or

vomiting.  No hematemesis, coffee-ground emesis.  No rectal bleeding, or melena.


GENITOURINARY:  No dysuria or hematuria. 


MUSCULOSKELETAL: Reports normal range of motion.,  Joint pain.


SKIN: No rashes.  No jaundice.


ENDOCRINE: No chills, fevers.  No excessive weight gain or loss.  No polydipsia 

or polyuria.


PSYCHIATRIC: Unremarkable. 


NEUROLOGY: No change in mental status.  Denies dizziness, headache.


ENT: Vision unremarkable. 


CONSTITUTIONAL: No recent weight loss. No fever, chills, night sweats.





Past Medical History


Past Medical History: Atrial Flutter, Coronary Artery Disease (CAD), Cancer, 

Chest Pain / Angina, Diabetes Mellitus, Hearing Disorder / Deafness, 

Hyperlipidemia, Hypertension, Prostate Disorder, Renal Disease


Additional Past Medical History / Comment(s): BPH, bladder cancer 5/1/21, 

hearing aids


History of Any Multi-Drug Resistant Organisms: None Reported


Past Surgical History: Appendectomy, Bladder Surgery, Heart Catheterization, 

Joint Replacement, Tonsillectomy


Additional Past Surgical History / Comment(s): left knee replaced, right rotator

cuff SX, COLONOSCOPY,


Past Anesthesia/Blood Transfusion Reactions: No Reported Reaction


Past Psychological History: Depression


Smoking Status: Former smoker


Past Alcohol Use History: Occasional


Additional Past Alcohol Use History / Comment(s): Smoked, up to 2 ppd, QUIT 

SMOKING 1981.


Past Drug Use History: None Reported





- Past Family History


  ** Mother


Family Medical History: No Reported History


Additional Family Medical History / Comment(s): Mother had history of congenital

heart disease, CHF.





  ** Father


Family Medical History: CVA/TIA


Additional Family Medical History / Comment(s): Father contracted malaria.  

History of CVA.





  ** Brother(s)


Family Medical History: No Reported History


Additional Family Medical History / Comment(s): Brother has history of 

headaches.





  ** Sister(s)


Family Medical History: No Reported History





  ** Daughter(s)


Family Medical History: No Reported History


Additional Family Medical History / Comment(s): Patient has one daughter with no

major medical problems.





  ** Son(s)


Family Medical History: No Reported History


Additional Family Medical History / Comment(s): Patient has one son with no 

major medical problems.





Medications and Allergies


                                Home Medications











 Medication  Instructions  Recorded  Confirmed  Type


 


Insulin Glargine,Hum.rec.anlog 35 unit SQ HS 12/22/14 12/18/24 History





[Lantus Solostar Pen]    


 


Rosuvastatin Calcium [Crestor] 10 mg PO DAILY 12/22/14 12/18/24 History


 


allopurinoL [Zyloprim] 100 mg PO DAILY 12/22/14 12/18/24 History


 


lisinopriL [Zestril] 2.5 mg PO DAILY  tab 10/07/18 12/18/24 Rx


 


Insulin Lispro [humaLOG Kwikpen] 10 unit SQ AC-TID 08/04/21 12/18/24 History


 


Apixaban [Eliquis] 5 mg PO BID 09/13/21 12/18/24 History


 


Tamsulosin [Flomax] 0.4 mg PO BID 05/30/22 12/18/24 History


 


Ascorbic Acid [Vitamin C] 1,000 mg PO DAILY 12/18/24 12/18/24 History


 


Ferrous Sulfate [Feosol] 325 mg PO DAILY 12/18/24 12/18/24 History


 


Semaglutide [Ozempic] 1 mg SQ CONNORS 12/18/24 12/18/24 History








                                    Allergies











Allergy/AdvReac Type Severity Reaction Status Date / Time


 


No Known Allergies Allergy   Verified 12/18/24 13:16














Physical Exam


Vitals: 


                                   Vital Signs











  Temp Pulse Pulse Resp BP BP Pulse Ox


 


 12/19/24 07:00  98.1 F   56 L  17   145/74  97


 


 12/19/24 03:19  98.1 F   62  16   146/57  94 L


 


 12/18/24 22:01  97.7 F   60  18   149/62  94 L


 


 12/18/24 21:14   99   18  116/67   94 L


 


 12/18/24 18:12   68   22  113/56   95


 


 12/18/24 14:44   87   18  140/63   97


 


 12/18/24 10:08  98 F  58 L   18  160/64   100








                                Intake and Output











 12/18/24 12/19/24 12/19/24





 22:59 06:59 14:59


 


Other:   


 


  # Voids 1 2 


 


  Weight 92.986 kg  














General appearance: The patient is alert, oriented, appears in no acute 

distress.


HET: Head is normocephalic and atraumatic.  Conjunctiva pink.  Sclera anicteric.


Neck: Supple without lymphadenopathy.  Trachea midline.


Heart: Regular.


Lungs: Equal expansion, normal respiratory effort.


Abdomen: Soft, nontender, nondistended.


Skin:  No rashes. No jaundice.


Extremities: Normal skin color and turgor.  No pedal edema.


Neurological: No focal deficits.  Alert and oriented x3.





Results


CBC & Chem 7: 


                                 12/19/24 05:48





                                 12/19/24 05:48


Labs: 


                  Abnormal Lab Results - Last 24 Hours (Table)











  12/18/24 12/18/24 12/18/24 Range/Units





  10:48 10:48 16:53 


 


Plt Count  88 L    (150-450)  k/uL


 


Chloride   111 H   ()  mmol/L


 


Glucose   132 H   (74-99)  mg/dL


 


POC Glucose (mg/dL)    138 H  ()  mg/dL


 


Lipase   3956 H   ()  U/L














  12/18/24 12/19/24 Range/Units





  20:58 06:10 


 


Plt Count    (150-450)  k/uL


 


Chloride    ()  mmol/L


 


Glucose    (74-99)  mg/dL


 


POC Glucose (mg/dL)  134 H  117 H  ()  mg/dL


 


Lipase    ()  U/L











Comments: 





CT abdomen pelvis with contrast reports inflammatory changes surrounding the 

pancreas.  Correlate for acute interstitial pancreatic head.  No surrounding 

fluid collections.  Mild splenomegaly redemonstrated.  Stable right perineum le

jurgen of uncertain etiology.  Stable umbilical and left inguinal fat-containing 

hernias.  Prostamegaly.  Moderate to severe coronary artery calcifications.





Assessment and Plan


(1) Pancreatitis


Narrative/Plan: 


84-year-old male with a history of diabetes mellitus on Ozempic with a previous 

history of pancreatitis after starting Ozempic for diabetes mellitus.  Patient 

had stopped and then restarted the Ozempic.  He has been on it for several pelon

hs without any problems until now.  He presented with elevated lipase and CT 

evidence of pancreatitis likely medication induced secondary to Ozempic.  Will 

obtain triglycerides.  Patient does have a remote history of alcoholism however 

has not drank in over 25 years.  Recommend discontinuing Ozempic.  Continue to 

treat symptomatically.  No further workup indicated.


Current Visit: Yes   Status: Acute   Code(s): K85.90 - ACUTE PANCREATITIS 

WITHOUT NECROSIS OR INFECTION, UNSP   SNOMED Code(s): 79383039


   





(2) Diabetes mellitus


Current Visit: Yes   Status: Acute   Code(s): E11.9 - TYPE 2 DIABETES MELLITUS 

WITHOUT COMPLICATIONS   SNOMED Code(s): 09530637


   





(3) Coronary artery disease


Current Visit: No   Status: Acute   Code(s): I25.10 - ATHSCL HEART DISEASE OF 

NATIVE CORONARY ARTERY W/O ANG PCTRS   SNOMED Code(s): 47555950


   





(4) Hyperlipidemia


Current Visit: No   Status: Acute   Code(s): E78.5 - HYPERLIPIDEMIA, UNSPECIFIED

   SNOMED Code(s): 23250169


   


Plan: 





1.  Continue symptomatic and supportive care


2.  May advance to low-fat diet


3.  Pain medication as needed


4.  Antiemetics as needed


5.  Protonix 40 mg daily for GI prophylaxis


6.  Recommend discontinuing Ozempic


7.  Triglycerides ordered


8.  No further workup indicated


Thank you for this consultation, anticipate patient will be ready for discharge 

tomorrow.





Dr. JACQUELYN Barnes


I agree with the dictator's note, documented as a scribe by Soledad HERNANDEZ.

## 2024-12-20 VITALS
TEMPERATURE: 98 F | RESPIRATION RATE: 16 BRPM | HEART RATE: 56 BPM | SYSTOLIC BLOOD PRESSURE: 138 MMHG | DIASTOLIC BLOOD PRESSURE: 70 MMHG

## 2024-12-20 LAB
ALBUMIN SERPL-MCNC: 3.6 G/DL (ref 3.8–4.9)
ALBUMIN/GLOB SERPL: 1.89 RATIO (ref 1.6–3.17)
ALP SERPL-CCNC: 61 U/L (ref 41–126)
ALT SERPL-CCNC: 15 U/L (ref 10–49)
ANION GAP SERPL CALC-SCNC: 8.4 MMOL/L (ref 4–12)
AST SERPL-CCNC: 20 U/L (ref 14–35)
BASOPHILS # BLD AUTO: 0.04 X 10*3/UL (ref 0–0.1)
BASOPHILS NFR BLD AUTO: 0.7 %
BUN SERPL-SCNC: 19.1 MG/DL (ref 9–27)
BUN/CREAT SERPL: 21.22 RATIO (ref 12–20)
CALCIUM SPEC-MCNC: 8.8 MG/DL (ref 8.7–10.3)
CHLORIDE SERPL-SCNC: 115 MMOL/L (ref 96–109)
CO2 SERPL-SCNC: 20.6 MMOL/L (ref 21.6–31.8)
EOSINOPHIL # BLD AUTO: 0.22 X 10*3/UL (ref 0.04–0.35)
EOSINOPHIL NFR BLD AUTO: 3.9 %
ERYTHROCYTE [DISTWIDTH] IN BLOOD BY AUTOMATED COUNT: 4.4 X 10*6/UL (ref 4.4–5.6)
ERYTHROCYTE [DISTWIDTH] IN BLOOD: 14.6 % (ref 11.5–14.5)
GLOBULIN SER CALC-MCNC: 1.9 G/DL (ref 1.6–3.3)
GLUCOSE BLD-MCNC: 74 MG/DL (ref 70–110)
GLUCOSE BLD-MCNC: 92 MG/DL (ref 70–110)
GLUCOSE SERPL-MCNC: 65 MG/DL (ref 70–110)
HCT VFR BLD AUTO: 37.9 % (ref 39.6–50)
HGB BLD-MCNC: 12.5 G/DL (ref 13–17)
IMM GRANULOCYTES BLD QL AUTO: 0.2 %
LIPASE SERPL-CCNC: 63 U/L (ref 14–60)
LYMPHOCYTES # SPEC AUTO: 1.83 X 10*3/UL (ref 0.9–5)
LYMPHOCYTES NFR SPEC AUTO: 32.7 %
MCH RBC QN AUTO: 28.4 PG (ref 27–32)
MCHC RBC AUTO-ENTMCNC: 33 G/DL (ref 32–37)
MCV RBC AUTO: 86.1 FL (ref 80–97)
MONOCYTES # BLD AUTO: 0.47 X 10*3/UL (ref 0.2–1)
MONOCYTES NFR BLD AUTO: 8.4 %
NEUTROPHILS # BLD AUTO: 3.02 X 10*3/UL (ref 1.8–7.7)
NEUTROPHILS NFR BLD AUTO: 54.1 %
NRBC BLD AUTO-RTO: 0 X 10*3/UL (ref 0–0.01)
PLATELET # BLD AUTO: 85 X 10*3/UL (ref 140–440)
POTASSIUM SERPL-SCNC: 4.4 MMOL/L (ref 3.5–5.5)
PROT SERPL-MCNC: 5.5 G/DL (ref 6.2–8.2)
SODIUM SERPL-SCNC: 144 MMOL/L (ref 135–145)
WBC # BLD AUTO: 5.59 X 10*3/UL (ref 4.5–10)

## 2024-12-20 NOTE — P.DS
Providers


Date of admission: 


12/18/24 14:32





Expected date of discharge: 12/20/24


Attending physician: 


Maco Christiansen





Consults: 





                                        





12/18/24 14:29


Consult Physician Urgent 


   Consulting Provider: Jane Barnes


   Consult Reason/Comments: acute pancreatitis


   Do you want consulting provider notified?: Yes











Primary care physician: 


Maco Christiansen





Hospital Course: 





HISTORY OF PRESENT ILLNESS


This is an 84-year-old male patient of mine with past medical history of 

coronary artery disease, hypertension, hyperlipidemia, diabetes mellitus type 2,

generalized anxiety disorder, bladder cancer under the care of Dr. Chris, 

chronic gout, history of recurrent pancreatitis, he was hospitalized last in 

July 2023 for acute pancreatitis, patient woke up in the morning today with lef

t-sided chest pain associated with minimal burping, minimal nausea no vomiting, 

he ended up coming to the emergency department for evaluation, he had a twelve-

lead EKG did not show evidence of acute abnormalities, his vital signs were 

okay, his lipase was quite elevated, at 3956, had a CT scan of the abdomen 

pelvis that show evidence of acute pancreatitis, because of the presentation he 

was started on IV fluid resuscitation, pain management, he was admitted to the 

hospital for further evaluation GI consultation was obtained from Dr. Barnes.





12/19: Patient sitting up in bed in no apparent distress, his feet a lot better 

today, he continues to have some pressure in the epigastric area, he has no 

nausea or vomiting at this time, he was started on IV fluid resuscitation, he is

receiving morphine for pain control, he was seen earlier by gastroenterology, 

patient will be started on full liquid diet, repeat the patient amylase and 

lipase in the next 24 hours.  His lipase today is 280 much better improvement.





12/20: Patient continues to feel much improvement.  Patient has been followed by

GI during his hospitalization for pancreatitis.  Recommendations are to 

discontinue Ozempic.  Continue the low-fat diet.  Patient is cleared for 

discharge from GI. Blood pressure 138/70, heart rate 56, pulse ox 96% on room 

air, afebrile.  Repeat blood work reveals hemoglobin 12.5, WBC 5.59.  Creatinine

0.9, sodium 144, potassium 4.4, chloride 115.  CO2 20.  Lipase 63.  Patient will

be discharged home today once all arrangements are completed.





DISCHARGE DIAGNOSES





1.  Acute pancreatitis that has been recurrent due to possible the use of GLP-1 

receptor agonist.  


2.  Hypertension and hypertensive cardiovascular disease.  


3.  Mixed hyperlipidemia.  


4.  Diabetes mellitus type 2.  


5.  paroxysmal atrial fibrillation.  


6.  History of bladder cancer under the care Dr Chris status post treatment.


7.  Chronic gout.


Discharge plan: Home


Greater than 35 minutes was utilized and coordinating patient's discharge.


Impression and plan of care have been directed as dictated by the signing 

physician.  Millie Crump nurse practitioner acting as scribe for signing 

physician.


Patient Condition at Discharge: Stable





Plan - Discharge Summary


Discharge Rx Participant: No


New Discharge Prescriptions: 


Continue


   Rosuvastatin Calcium [Crestor] 10 mg PO DAILY


   allopurinoL [Zyloprim] 100 mg PO DAILY


   Insulin Glargine,Hum.rec.anlog [Lantus Solostar Pen] 35 unit SQ HS


   lisinopriL [Zestril] 2.5 mg PO DAILY  tab


   Apixaban [Eliquis] 5 mg PO BID


   Tamsulosin [Flomax] 0.4 mg PO BID


   Ascorbic Acid [Vitamin C] 1,000 mg PO DAILY


   Insulin Lispro [humaLOG Kwikpen] 10 unit SQ AC-TID


   Ferrous Sulfate [Iron (65 MG Elemental)] 325 mg PO DAILY





Discontinued


   Semaglutide [Ozempic] 1 mg SQ CONNORS


Discharge Medication List





Insulin Glargine,Hum.rec.anlog [Lantus Solostar Pen] 35 unit SQ HS 12/22/14 

[History]


Rosuvastatin Calcium [Crestor] 10 mg PO DAILY 12/22/14 [History]


allopurinoL [Zyloprim] 100 mg PO DAILY 12/22/14 [History]


lisinopriL [Zestril] 2.5 mg PO DAILY  tab 10/07/18 [Rx]


Insulin Lispro [humaLOG Kwikpen] 10 unit SQ AC-TID 08/04/21 [History]


Apixaban [Eliquis] 5 mg PO BID 09/13/21 [History]


Tamsulosin [Flomax] 0.4 mg PO BID 05/30/22 [History]


Ascorbic Acid [Vitamin C] 1,000 mg PO DAILY 12/18/24 [History]


Ferrous Sulfate [Iron (65 MG Elemental)] 325 mg PO DAILY 12/18/24 [History]








Follow up Appointment(s)/Referral(s): 


Maco Christiansen MD [Primary Care Provider] - 1 Week


Discharge Disposition: HOME SELF-CARE

## 2024-12-20 NOTE — P.PN
Subjective


Progress Note Date: 12/20/24


Principal diagnosis: 





Pancreatitis








This a pleasant 84-year-old male with a past medical history of diabetes 

mellitus on Ozempic, pancreatitis, CHF, atrial fibrillation on Eliquis, 

hypertension, coronary artery disease and bladder cancer status post chemo 

diagnosed in 2020.  Patient states he started getting epigastric pain Tuesday 

night not associated with any nausea or vomiting.  States he had this once 

before 1 to 2 years ago.  States he was diagnosed with pancreatitis but he was 

unsure of the etiology.  He denies any current alcohol use but states that he 

was a heavy drinker and drinks daily for 8 to 10 years but he quit 25 years ago.

 Patient presented with an elevated lipase of 3956, no elevation in his LFTs, no

leukocytosis.  Apparently patient has been on Ozempic for his diabetes mellitus 

and was thought that could be the cause of his pancreatitis.  They had stopped 

it and then resumed it.  Today patient states his abdominal pain is quite a bit 

better and almost resolved.  He has been tolerating clear liquid diet without 

any nausea or vomiting.





12/20/2024


Patient seen and examined today as a follow-up for abdominal pain and acute 

pancreatitis.  He states abdominal pain has resolved.  He is tolerating a low-

fat diet.  No nausea or vomiting.  Triglycerides 95 today's repeat labs LFTs 

unremarkable lipase continues to trend down and is 63 down from 280 yesterday.





Objective





- Vital Signs


Vital signs: 


                                   Vital Signs











Temp  98 F   12/20/24 01:39


 


Pulse  59 L  12/20/24 01:39


 


Resp  18   12/20/24 01:39


 


BP  125/62   12/20/24 01:39


 


Pulse Ox  95   12/20/24 01:39


 


FiO2      








                                 Intake & Output











 12/19/24 12/19/24 12/20/24





 06:59 18:59 06:59


 


Intake Total  716 


 


Balance  716 


 


Weight 92.986 kg  


 


Intake:   


 


  Oral  716 


 


Other:   


 


  # Voids 2 2 2














- Exam





General appearance: The patient is alert, oriented, appears in no acute 

distress.


HET: Head is normocephalic and atraumatic.  Conjunctiva pink.  Sclera anicteric.


Neck: Supple without lymphadenopathy.  


Abdomen: Soft, nontender, nondistended.


Extremities: Normal skin color and turgor.  No pedal edema


Skin: No rashes, no jaundice


Neurological: No focal deficits.  Alert and oriented.





- Labs


CBC & Chem 7: 


                                 12/20/24 04:33





                                 12/20/24 04:33


Labs: 


                  Abnormal Lab Results - Last 24 Hours (Table)











  12/19/24 12/19/24 12/19/24 Range/Units





  05:48 05:48 12:01 


 


MCHC  31.4 L    (32.0-37.0)  g/dL


 


RDW  14.6 H    (11.5-14.5)  %


 


Plt Count  89 L    (140-440)  X 10*3/uL


 


Chloride   110 H   ()  mmol/L


 


Glucose   128 H   ()  mg/dL


 


POC Glucose (mg/dL)    259 H  ()  mg/dL


 


Total Protein   6.0 L   (6.2-8.2)  g/dL


 


Lipase   280 H   (14-60)  U/L














  12/19/24 12/19/24 Range/Units





  17:28 20:38 


 


MCHC    (32.0-37.0)  g/dL


 


RDW    (11.5-14.5)  %


 


Plt Count    (140-440)  X 10*3/uL


 


Chloride    ()  mmol/L


 


Glucose    ()  mg/dL


 


POC Glucose (mg/dL)  136 H  159 H  ()  mg/dL


 


Total Protein    (6.2-8.2)  g/dL


 


Lipase    (14-60)  U/L














Assessment and Plan


(1) Pancreatitis


Narrative/Plan: 


84-year-old male with a history of diabetes mellitus on Ozempic with a previous 

history of pancreatitis after starting Ozempic for diabetes mellitus.  Patient 

had stopped and then restarted the Ozempic.  He has been on it for several 

months without any problems until now.  He presented with elevated lipase and CT

evidence of pancreatitis likely medication induced secondary to Ozempic.  Will 

obtain triglycerides.  Patient does have a remote history of alcoholism however 

has not drank in over 25 years.  Recommend discontinuing Ozempic.  Continue to 

treat symptomatically.  No further workup indicated.


Current Visit: Yes   Status: Acute   Code(s): K85.90 - ACUTE PANCREATITIS 

WITHOUT NECROSIS OR INFECTION, UNSP   SNOMED Code(s): 82394737


   





(2) Diabetes mellitus


Current Visit: Yes   Status: Acute   Code(s): E11.9 - TYPE 2 DIABETES MELLITUS 

WITHOUT COMPLICATIONS   SNOMED Code(s): 09248161


   





(3) Coronary artery disease


Current Visit: No   Status: Acute   Code(s): I25.10 - ATHSCL HEART DISEASE OF 

NATIVE CORONARY ARTERY W/O ANG PCTRS   SNOMED Code(s): 57746377


   





(4) Hyperlipidemia


Current Visit: No   Status: Acute   Code(s): E78.5 - HYPERLIPIDEMIA, UNSPECIFIED

  SNOMED Code(s): 34885958


   


Plan: 





1.  Continue symptomatic and supportive care


2.  Continue low-fat diet


3.  Pain medication as needed


4.  Recommend discontinuing Ozempic


5.  No further workup indicated


Thank you for this consultation, a patient is cleared by gastroenterology for 

discharge.  We will sign off at this time.





Dr. JACQUELYN Barnes


I agree with the dictator's note, documented as a scribe by Soledad HERNANDEZ.